# Patient Record
Sex: FEMALE | Race: WHITE | Employment: UNEMPLOYED | URBAN - METROPOLITAN AREA
[De-identification: names, ages, dates, MRNs, and addresses within clinical notes are randomized per-mention and may not be internally consistent; named-entity substitution may affect disease eponyms.]

---

## 2017-01-03 ENCOUNTER — OFFICE VISIT (OUTPATIENT)
Dept: PULMONOLOGY | Age: 37
End: 2017-01-03

## 2017-01-03 VITALS
DIASTOLIC BLOOD PRESSURE: 60 MMHG | WEIGHT: 206 LBS | RESPIRATION RATE: 18 BRPM | HEART RATE: 76 BPM | BODY MASS INDEX: 34.32 KG/M2 | HEIGHT: 65 IN | OXYGEN SATURATION: 99 % | TEMPERATURE: 98.1 F | SYSTOLIC BLOOD PRESSURE: 104 MMHG

## 2017-01-03 DIAGNOSIS — J44.9 ASTHMA WITH COPD (HCC): ICD-10-CM

## 2017-01-03 DIAGNOSIS — F17.200 TOBACCO DEPENDENCE: Primary | ICD-10-CM

## 2017-01-03 RX ORDER — DOXYCYCLINE 100 MG/1
100 CAPSULE ORAL 2 TIMES DAILY
Qty: 14 CAP | Refills: 0 | Status: SHIPPED | OUTPATIENT
Start: 2017-01-03 | End: 2017-03-13 | Stop reason: ALTCHOICE

## 2017-01-03 RX ORDER — ALBUTEROL SULFATE 90 UG/1
2 AEROSOL, METERED RESPIRATORY (INHALATION)
Qty: 1 INHALER | Refills: 2 | Status: SHIPPED | OUTPATIENT
Start: 2017-01-03 | End: 2017-03-13 | Stop reason: SDUPTHER

## 2017-01-03 NOTE — MR AVS SNAPSHOT
Visit Information Date & Time Provider Department Dept. Phone Encounter #  
 1/3/2017  3:15 PM Lopez Redman MD [facility] [place] [id number] Follow-up Instructions Return in about 6 months (around 7/3/2017). Follow-up and Disposition History Upcoming Health Maintenance Date Due DTaP/Tdap/Td series (1 - Tdap) 1/25/1999 PAP AKA CERVICAL CYTOLOGY 7/22/2018 Allergies as of 1/3/2017  Review Complete On: 1/3/2017 By: Mi Canales LPN Severity Noted Reaction Type Reactions Prozac [Fluoxetine] Low 02/11/2012   Not Verified Unknown (comments) Current Immunizations  Reviewed on 9/26/2016 Name Date Influenza Vaccine Craig Stevenson) 9/26/2016 PPD 11/15/2010 Pneumococcal Polysaccharide (PPSV-23) 9/26/2016 Not reviewed this visit You Were Diagnosed With   
  
 Codes Comments Tobacco dependence    -  Primary ICD-10-CM: D67.343 ICD-9-CM: 305.1 [facility]     ICD-10-CM: J44.9, J45.909 ICD-9-CM: 493.20 Vitals BP Pulse Temp Resp Height(growth percentile) Weight(growth percentile) 104/60 (BP 1 Location: Right arm, BP Patient Position: Sitting) 76 98.1 °F (36.7 °C) (Oral) 18 5' 4.5\" (1.638 m) 206 lb (93.4 kg) SpO2 BMI OB Status Smoking Status 99% 34.81 kg/m2 Medically Induced Current Every Day Smoker BMI and BSA Data Body Mass Index Body Surface Area 34.81 kg/m 2 2.06 m 2 Preferred Pharmacy Pharmacy Name Phone [facility] [place], [address] [phone] Your Updated Medication List  
  
   
This list is accurate as of: 1/3/17  3:44 PM.  Always use your most recent med list.  
  
  
  
  
 * albuterol sulfate 90 mcg/actuation Aepb Commonly known as:  Betty Jerel Take 1 Puff by inhalation four (4) times daily as needed for Cough (wheeze). * albuterol 90 mcg/actuation inhaler Commonly known as:  PROVENTIL HFA, VENTOLIN HFA, PROAIR HFA Take 2 Puffs by inhalation every four (4) hours as needed for Wheezing. beclomethasone 80 mcg/actuation inhaler Commonly known as:  QVAR INHALE 1 PUFF TWO TIMES A DAY Biotin 2,500 mcg Cap Take  by mouth. BUSPAR PO Take 10 mg by mouth two (2) times a day. doxycycline 100 mg capsule Commonly known as:  Leticia Rhymes Take 1 Cap by mouth two (2) times a day. * GEODON 80 mg capsule Generic drug:  ziprasidone Take 80 mg by mouth daily. * GEODON 20 mg capsule Generic drug:  ziprasidone Take 20 mg by mouth daily. inhalational spacing device 1 Each by Does Not Apply route as needed. KlonoPIN 1 mg tablet Generic drug:  clonazePAM  
Take 1 mg by mouth daily as needed. LAMICTAL PO Take  by mouth.  
  
 lidocaine 4 % topical cream  
Commonly known as:  XYLOCAINE Apply  to affected area two (2) times daily as needed for Pain. naproxen 250 mg tablet Commonly known as:  NAPROSYN  
TAKE ONE TABLET TWO TIMES A DAY WITH MEALS  
  
 omeprazole 20 mg capsule Commonly known as:  PRILOSEC  
TAKE ONE CAPSULE EVERY DAY  
  
 * SEROquel 200 mg tablet Generic drug:  QUEtiapine Take 200 mg by mouth daily. * SEROquel 50 mg tablet Generic drug:  QUEtiapine Take 150 mg by mouth daily. * Notice: This list has 6 medication(s) that are the same as other medications prescribed for you. Read the directions carefully, and ask your doctor or other care provider to review them with you. Prescriptions Sent to Pharmacy Refills  
 beclomethasone (QVAR) 80 mcg/actuation inhaler 5 Sig: INHALE 1 PUFF TWO TIMES A DAY Class: Normal  
 Pharmacy: [facility] - [place], [address]  Ph #: [phone]  
 albuterol (PROVENTIL HFA, VENTOLIN HFA, PROAIR HFA) 90 mcg/actuation inhaler 2  Sig: Take 2 Puffs by inhalation every four (4) hours as needed for Wheezing. Class: Normal  
 Pharmacy: [facility] - [address] [facility], [address] [place] Ph #: [phone] Route: Inhalation  
 doxycycline (MONODOX) 100 mg capsule 0 Sig: Take 1 Cap by mouth two (2) times a day. Class: Normal  
 Pharmacy: [facility] - [address] [facility], [address] [place] Ph #: [phone] Route: Oral  
  
Follow-up Instructions Return in about 6 months (around 7/3/2017). Patient Instructions Continue Qvar 2 inhalations twice daily and remember to wash mouth with water and spit it out after inhaling Qvar Albuterol 2 inhalations every 4 hours as needed if you require albuterol too often to control respiratory symptoms call the office for severe symptoms go to the emergency room Doxycycline 1 capsule twice daily for one week Do everything you can to stop smoking including using a nicotine patch every day and substituting hard candy and gum you want to smoke Patient Instructions History Introducing [facility]! [facility] introduces SupportSpace patient portal. Now you can access parts of your medical record, email your doctor's office, and request medication refills online. 1. In your internet browser, go to [email or web address] 2. Click on the First Time User? Click Here link in the Sign In box. You will see the New Member Sign Up page. 3. Enter your SupportSpace Access Code exactly as it appears below. You will not need to use this code after youve completed the sign-up process. If you do not sign up before the expiration date, you must request a new code. · SupportSpace Access Code: [email or web address] Expires: 4/3/2017  3:44 PM 
 
4. Enter the last four digits of your Social Security Number (xxxx) and Date of Birth (mm/dd/yyyy) as indicated and click Submit. You will be taken to the next sign-up page. 5. Create a Mobile Health Consumert ID.  This will be your Mobile Health [employer] login ID and cannot be changed, so think of one that is secure and easy to remember. 6. Create a [employer] password. You can change your password at any time. 7. Enter your Password Reset Question and Answer. This can be used at a later time if you forget your password. 8. Enter your e-mail address. You will receive e-mail notification when new information is available in [address] 9. Click Sign Up. You can now view and download portions of your medical record. 10. Click the Download Summary menu link to download a portable copy of your medical information. If you have questions, please visit the Frequently Asked Questions section of the [employer] website. Remember, Coskata is NOT to be used for urgent needs. For medical emergencies, dial 911. Now available from your iPhone and Android! Please provide this summary of care documentation to your next provider. Your primary care clinician is listed as Fatemeh Kemp. If you have any questions after today's visit, please call [phone].

## 2017-01-03 NOTE — PROGRESS NOTES
[facility]  Pulmonary, Critical Care, and Sleep Medicine      Chief complaint:  cough    HPI:  Joseluis Dodge is 43years old and returns to the office today for followup concerning a cough which has been present for months. She relates the cough is productive of clear to Mae Walters to occasional yellow mucus. She denies hemoptysis or chest pain and reports shortness of breath but is out of her albuterol and Qvar. She also relates she continues to smoke 30 cigarettes a day. Allergies   Allergen Reactions    Prozac [Fluoxetine] Unknown (comments)     Current Outpatient Prescriptions   Medication Sig    beclomethasone (QVAR) 80 mcg/actuation inhaler INHALE 1 PUFF TWO TIMES A DAY    albuterol (PROVENTIL HFA, VENTOLIN HFA, PROAIR HFA) 90 mcg/actuation inhaler Take 2 Puffs by inhalation every four (4) hours as needed for Wheezing.  doxycycline (MONODOX) 100 mg capsule Take 1 Cap by mouth two (2) times a day.  omeprazole (PRILOSEC) 20 mg capsule TAKE ONE CAPSULE EVERY DAY    ziprasidone (GEODON) 20 mg capsule Take 20 mg by mouth daily.  LAMOTRIGINE (LAMICTAL PO) Take  by mouth.  Biotin 2,500 mcg cap Take  by mouth.  albuterol sulfate (PROAIR RESPICLICK) 90 mcg/actuation aepb Take 1 Puff by inhalation four (4) times daily as needed for Cough (wheeze).  inhalational spacing device 1 Each by Does Not Apply route as needed.  lidocaine (XYLOCAINE) 4 % topical cream Apply  to affected area two (2) times daily as needed for Pain.  QUEtiapine (SEROQUEL) 200 mg tablet Take 200 mg by mouth daily.  QUEtiapine (SEROQUEL) 50 mg tablet Take 150 mg by mouth daily.  ziprasidone (GEODON) 80 mg capsule Take 80 mg by mouth daily.  BUSPIRONE HCL (BUSPAR PO) Take 10 mg by mouth two (2) times a day.  clonazePAM (KLONOPIN) 1 mg tablet Take 1 mg by mouth daily as needed.       naproxen (NAPROSYN) 250 mg tablet TAKE ONE TABLET TWO TIMES A DAY WITH MEALS     No current facility-administered medications for this visit. Past Medical History   Diagnosis Date    Asthma     Bipolar 1 disorder ([address]      Followed by [facility], depression and anxiety    Genital HSV     IBS (irritable bowel syndrome)     Onychomycosis      was on lamisol for this 3 mos ago. Was on it for 6 mos.  Psychiatric disorder     Reactive airway disease     Tinea pedis      Past Surgical History   Procedure Laterality Date    Hx gyn      Hx ovarian cyst removal       right ovary    Hx cyst removal       hand    Hx orthopaedic       right hand    Hx colonoscopy       age 22 for diarrhea     Social History     Social History    Marital status: SINGLE     Spouse name: N/A    Number of children: N/A    Years of education: N/A     Occupational History    Not on file.      Social History Main Topics    Smoking status: Current Every Day Smoker     Packs/day: 2.00     Years: 23.00     Types: Cigarettes     Start date: 2/9/1992    Smokeless tobacco: Never Used    Alcohol use 2.5 oz/week     5 Cans of beer per week      Comment: 2x week and on weekends: 1-6 drinks/sitting    Drug use: No    Sexual activity: Yes      Comment: condoms     Other Topics Concern    Not on file     Social History Narrative     Family History   Problem Relation Age of Onset   [de-identified] Arthritis-osteo Mother     Migraines Mother     Stroke Mother     Alcohol abuse Father    Lupe Jews Father     Hypertension Father     Asthma Sister     Diabetes Sister     Heart Disease Sister     Stroke Maternal Grandmother     Diabetes Maternal Grandfather     COPD Maternal Grandfather     No Known Problems Paternal Grandmother     No Known Problems Paternal Grandfather        Review of systems:  She denies fever chills poor appetite and weight loss    Physical Exam:  Visit Vitals    /60 (BP 1 Location: Right arm, BP Patient Position: Sitting)    Pulse 76    Temp 98.1 °F (36.7 °C) (Oral)    Resp 18  Ht 5' 4.5\" (1.638 m)    Wt 93.4 kg (206 lb)    SpO2 99%  Comment: RA Rest    BMI 34.81 kg/m2       Well-developed well-nourished/obese  HEENT: WNL  Lymph node exam: Supraclavicular cervical lymph nodes negative  Chest: Equal symmetrical expansion no excess remote usage no dullness no wheezes rales rubs  Heart: Regular rhythm no gallop or murmur no JVD no peripheral edema  Extremities: No cyanosis clubbing or calf tenderness  Neurological: Alert and oriented    LABS:    O2 sat room air at rest 99%    Impression:   Chronic bronchitis related to cigarette smoking  History of COPD asthma    Plan:  Again encouraged the patient to stop smoking she says she is going to a smoking cessation class and will also advise her to use nicotine patches substitute gum and hard candy  Continue Qvar when necessary albuterol  Short course of doxycycline for history of discolored mucus    Mukund Goss MD , [facility]    CC: Leonardo Alvarado DO     [address] [facility]  [place], [address] [facility] [address]     P: [phone]     F: [phone]

## 2017-01-03 NOTE — PATIENT INSTRUCTIONS
Continue Qvar 2 inhalations twice daily and remember to wash mouth with water and spit it out after inhaling Qvar  Albuterol 2 inhalations every 4 hours as needed if you require albuterol too often to control respiratory symptoms call the office for severe symptoms go to the emergency room  Doxycycline 1 capsule twice daily for one week  Do everything you can to stop smoking including using a nicotine patch every day and substituting hard candy and gum you want to smoke

## 2017-01-04 RX ORDER — BENZONATATE 200 MG/1
200 CAPSULE ORAL
Qty: 21 CAP | Refills: 2 | Status: SHIPPED | OUTPATIENT
Start: 2017-01-04 | End: 2017-01-11

## 2017-03-13 ENCOUNTER — OFFICE VISIT (OUTPATIENT)
Dept: INTERNAL MEDICINE CLINIC | Age: 37
End: 2017-03-13

## 2017-03-13 VITALS
HEIGHT: 65 IN | WEIGHT: 198 LBS | RESPIRATION RATE: 16 BRPM | HEART RATE: 77 BPM | TEMPERATURE: 98.4 F | DIASTOLIC BLOOD PRESSURE: 62 MMHG | BODY MASS INDEX: 32.99 KG/M2 | OXYGEN SATURATION: 95 % | SYSTOLIC BLOOD PRESSURE: 108 MMHG

## 2017-03-13 DIAGNOSIS — R12 HEART BURN: ICD-10-CM

## 2017-03-13 DIAGNOSIS — Z79.899 ENCOUNTER FOR LONG-TERM (CURRENT) USE OF MEDICATIONS: ICD-10-CM

## 2017-03-13 DIAGNOSIS — J02.9 SORE THROAT: ICD-10-CM

## 2017-03-13 DIAGNOSIS — J40 BRONCHITIS: ICD-10-CM

## 2017-03-13 DIAGNOSIS — F17.200 TOBACCO DEPENDENCE: ICD-10-CM

## 2017-03-13 DIAGNOSIS — J45.30 MILD PERSISTENT ASTHMA WITHOUT COMPLICATION: Primary | ICD-10-CM

## 2017-03-13 RX ORDER — ALBUTEROL SULFATE 90 UG/1
2 AEROSOL, METERED RESPIRATORY (INHALATION)
Qty: 1 INHALER | Refills: 2 | Status: SHIPPED | OUTPATIENT
Start: 2017-03-13 | End: 2018-01-09 | Stop reason: SDUPTHER

## 2017-03-13 RX ORDER — OMEPRAZOLE 20 MG/1
20 CAPSULE, DELAYED RELEASE ORAL DAILY
Qty: 30 CAP | Refills: 5 | Status: CANCELLED | OUTPATIENT
Start: 2017-03-13

## 2017-03-13 RX ORDER — DOXYCYCLINE 100 MG/1
100 CAPSULE ORAL 2 TIMES DAILY
Qty: 20 CAP | Refills: 0 | Status: SHIPPED | OUTPATIENT
Start: 2017-03-13 | End: 2017-03-23

## 2017-03-13 RX ORDER — OMEPRAZOLE 20 MG/1
20 CAPSULE, DELAYED RELEASE ORAL DAILY
Qty: 30 CAP | Refills: 1 | Status: SHIPPED | OUTPATIENT
Start: 2017-03-13 | End: 2017-05-17 | Stop reason: SDUPTHER

## 2017-03-13 RX ORDER — SERTRALINE HYDROCHLORIDE 100 MG/1
100 TABLET, FILM COATED ORAL DAILY
COMMUNITY
End: 2018-06-08 | Stop reason: ALTCHOICE

## 2017-03-13 RX ORDER — DEXTROAMPHETAMINE SACCHARATE, AMPHETAMINE ASPARTATE, DEXTROAMPHETAMINE SULFATE AND AMPHETAMINE SULFATE 2.5; 2.5; 2.5; 2.5 MG/1; MG/1; MG/1; MG/1
20 TABLET ORAL 3 TIMES DAILY
COMMUNITY
End: 2017-10-09

## 2017-03-13 NOTE — PROGRESS NOTES
Chief Complaint   Patient presents with    Sore Throat     x 2 days    Cough     1. Have you been to the ER, urgent care clinic since your last visit? Hospitalized since your last visit?no      2. Have you seen or consulted any other health care providers outside of the [address] [place] [address] since your last visit? Include any pap smears or colon screening.  No

## 2017-03-13 NOTE — PROGRESS NOTES
HISTORY OF PRESENT ILLNESS  Francy Chow is a 42 y.o. female. Here today for a sore throat and a cough. HPI   Sore throat  She reports sore throat for the last 2 days. She has had a cough \"forever\". Denies any sick contacts. She did get her flu shot this year. Asthma  She was recently seen by her pulmonologist, treated with doxycycline in Jan. She states she does use her qvar daily but has run out of her albuterol inhaler, has not used this in several weeks now. She continues to smoke, states she is trying to quit. She was previously on chantix for this. She reports increase in dark mucus production over the last several weeks. Increased coughing. Denies any fevers. Chronic diarrhea/ heart burn  She has not met with [facility] for chronic diarrhea and heartburn symptoms. She felt that they would order and colonoscopy and her insurance would not cover this. She is requesting to have her liver enzymes checked, as she has been told that her psychiatric medication can cause liver problems. She is currently on seroquel, geodon, clonopin and buspar. She follows with [facility] psych regularly for her mental health care, happy with current treatment plan. Past Medical History:   Diagnosis Date    Asthma     B[facility] 1 disorder ([facility]     Follow[facility] by [facility] Dept, depression and anxiety    Genital HSV     IBS (irritable bowel syndrome)     Onychomycosis     was on lamisol for this 3 mos ago. Was on it for 6 mos.  Psychiatric disorder     Reactive airway disease     Tinea pedis      Past Surgical History:   Procedure Laterality Date    HX COLONOSCOPY      age 22 for diarrhea    HX CYST REMOVAL      hand    HX GYN      HX ORTHOPAEDIC      right hand    HX OVARIAN CYST REMOVAL      right ovary     Current Outpatient Prescriptions   Medication Sig    sertraline (ZOLOFT) 100 mg tablet Take 100 mg by mouth daily.     dextroamphetamine-amphetamine (ADDERALL) 10 mg tablet Take 10 mg by mouth three (3) times daily.  beclomethasone (QVAR) 80 mcg/actuation inhaler INHALE 1 PUFF TWO TIMES A DAY    LAMOTRIGINE (LAMICTAL PO) Take  by mouth daily.  Biotin 2,500 mcg cap Take  by mouth daily.  inhalational spacing device 1 Each by Does Not Apply route as needed.  lidocaine (XYLOCAINE) 4 % topical cream Apply  to affected area two (2) times daily as needed for Pain.  naproxen (NAPROSYN) 250 mg tablet TAKE ONE TABLET TWO TIMES A DAY WITH MEALS    QUEtiapine (SEROQUEL) 50 mg tablet Take 50 mg by mouth daily.  ziprasidone (GEODON) 80 mg capsule Take 80 mg by mouth daily.  BUSPIRONE HCL (BUSPAR PO) Take 10 mg by mouth two (2) times a day.  clonazePAM (KLONOPIN) 1 mg tablet Take 1 mg by mouth daily as needed.  albuterol (PROVENTIL HFA, VENTOLIN HFA, PROAIR HFA) 90 mcg/actuation inhaler Take 2 Puffs by inhalation every four (4) hours as needed for Wheezing.  omeprazole (PRILOSEC) 20 mg capsule TAKE ONE CAPSULE EVERY DAY    albuterol sulfate (PROAIR RESPICLICK) 90 mcg/actuation aepb Take 1 Puff by inhalation four (4) times daily as needed for Cough (wheeze). No current facility-administered medications for this visit. Allergies and Intolerances: Allergies   Allergen Reactions    Prozac [Fluoxetine] Unknown (comments) and Other (comments)     Family History:   Family History   Problem Relation Age of Onset   [facility] Arthritis-osteo Mother    [facility] Migraines Mother     Stroke Mother     Alcohol abuse Father     Cancer Father     Hypertension Father     Asthma Sister     Diabetes Sister     Heart Disease Sister     Stroke Maternal Grandmother     Diabetes Maternal Grandfather     COPD Maternal Grandfather     No Known Problems Paternal Grandmother     No Known Problems Paternal Grandfather         Social History:   She  reports that she has been smoking Cigarettes. She started smoking about 25 years ago. She has a 46.00 pack-year smoking history.  She has never used smokeless tobacco.  She  reports that she drinks about 2.5 oz of alcohol per week      Vitals:   Visit Vitals    /62 (BP 1 Location: Left arm, BP Patient Position: Sitting)    Pulse 77    Temp 98.4 °F (36.9 °C) (Oral)    Resp 16    Ht 5' 4.5\" (1.638 m)    Wt 198 lb (89.8 kg)    SpO2 95%    BMI 33.46 kg/m2        Body surface area is 2.02 meters squared. BP Readings from Last 3 Encounters:   03/13/17 108/62   01/03/17 104/60   12/07/16 121/73        Wt Readings from Last 3 Encounters:   03/13/17 198 lb (89.8 kg)   01/03/17 206 lb (93.4 kg)   12/07/16 199 lb 5 oz (90.4 kg)        Case and notes discussed with MA and reviewed with patient for accuracy. I have personally reviewed and subsequently discussed with the MA any pertinent, preliminary and incomplete elements of the history related to the chief complaint that were recorded by the MA in the patients chart during the initial rooming of the patient. As I have explored the necessary and required elements in detail with the patient during my personal interview with them, I have personally verified, clarified, amended, added to and/or revised said elements based on information acquired during during the interview. Review of Systems   Constitutional: Negative for chills and fever. HENT: Positive for sore throat. Negative for congestion and ear pain. Eyes: Negative for blurred vision and double vision. Respiratory: Positive for cough, sputum production, shortness of breath and wheezing. Cardiovascular: Negative for chest pain, palpitations and leg swelling. Gastrointestinal: Positive for diarrhea and heartburn. Negative for abdominal pain, blood in stool, melena, nausea and vomiting. Chronic intermittent diarrhea, no change   Genitourinary: Negative for dysuria and hematuria. Skin: Negative for itching and rash. Neurological: Negative for seizures, loss of consciousness and headaches.        Physical Exam   Constitutional: She appears well-developed and well-nourished. No distress. HENT:   Head: Normocephalic and atraumatic. Right Ear: Tympanic membrane, external ear and ear canal normal.   Left Ear: Tympanic membrane, external ear and ear canal normal.   Nose: Nose normal. No mucosal edema. Mouth/Throat: Uvula is midline, oropharynx is clear and moist and mucous membranes are normal. No oropharyngeal exudate, posterior oropharyngeal edema or posterior oropharyngeal erythema. Prominent tonsils bilaterally. Eyes: Conjunctivae are normal. Pupils are equal, round, and reactive to light. Neck: No thyromegaly present. Tenderness to left cervical region   Cardiovascular: Normal rate, regular rhythm and normal heart sounds. Pulmonary/Chest: Effort normal. No accessory muscle usage. No respiratory distress. She has no decreased breath sounds. She has wheezes. She has no rhonchi. Exp wheezes to bilateral mid lobes. Neb treatment given in office, after treatment wheezing resolved. O2 sats 95-97% RA. Abdominal: Soft. Bowel sounds are normal. She exhibits no distension. There is no tenderness. Musculoskeletal: She exhibits no edema. Lymphadenopathy:     She has no cervical adenopathy. Neurological: She is alert. Skin: Skin is warm and dry. No rash noted. Psychiatric: She has a normal mood and affect. Her behavior is normal.   Nursing note and vitals reviewed. ASSESSMENT and PLAN    ICD-10-CM ICD-9-CM    1. Mild persistent asthma without complication: refilled albuterol MDI. Encouraged her to use this as needed for wheezing or coughing spells. Neb treatment given in office with improvement to wheezing. Vitals stable, O2 sats 95-97% RA. Due to increased mucus will be treating for bronchitis as well, see below. J45.30 493.90 albuterol (PROVENTIL HFA, VENTOLIN HFA, PROAIR HFA) 90 mcg/actuation inhaler   2. Bronchitis: increased mucus production, smoker, asthma.  Will treat today with doxycycline, patient has taken this in the past with no problems. Encouraged her to take with yogurt or probiotic to help prevent GI upset, normal elayne. Encouraged her to use albuterol inhaler as needed for wheezing, coughing as well. Encouraged her to cut back on smoking, working towards smoking cessation. Discussed reasons to call or follow up sooner with no improvement, fevers or any other concerns. She agrees to plan. J40 490 doxycycline (MONODOX) 100 mg capsule   3. Sore throat: rapid strep neg today. Likely related to bronchitis, see above. J02.9 462 AMB POC RAPID STREP A   4. Heart burn: refilled prilosec for her today as this helps with her heartburn symptoms. Encouraged her to follow up with GI as previously recommended for further eval of long term heartburn as well as diarrhea symptoms. R12 787.1 omeprazole (PRILOSEC) 20 mg capsule   5. Tobacco dependence: The patient was counseled on the dangers of tobacco use, and was advised to quit. Reviewed strategies to maximize success, including removing cigarettes and smoking materials from environment, stress management, substitution of other forms of reinforcement and support of family/friends. F17.200 305.1    6. Encounter for long-term (current) use of medications: she is requesting check of her liver enzymes due to hepatic clearance of many of her psychiatric medication. She continues to follow with [facility] psych regularly for her mental health care, happy with current treatment plan. Z79.899 V58.69 HEPATIC FUNCTION PANEL     Emily Shelton was seen today for sore throat and cough. Diagnoses and all orders for this visit:    Mild persistent asthma without complication  -     albuterol (PROVENTIL HFA, VENTOLIN HFA, PROAIR HFA) 90 mcg/actuation inhaler; Take 2 Puffs by inhalation every four (4) hours as needed for Wheezing. Bronchitis  -     doxycycline (MONODOX) 100 mg capsule; Take 1 Cap by mouth two (2) times a day for 10 days.     Sore throat  - AMB POC RAPID STREP A    Heart burn  -     omeprazole (PRILOSEC) 20 mg capsule; Take 1 Cap by mouth daily. Tobacco dependence    Encounter for long-term (current) use of medications  -     HEPATIC FUNCTION PANEL; Future    Other orders  -     Cancel: omeprazole (PRILOSEC) 20 mg capsule; Take 1 Cap by mouth daily. Follow-up Disposition:  Return in about 6 months (around 9/13/2017), or if symptoms worsen or fail to improve. The plan of care was discussed with the patient, who verbalizes understanding. Discussed all medications, side effects with patient. The patient is to follow up as scheduled and will report to the ED or the office if symptoms change or increase. The patient has voiced understanding and will comply to plan of care.

## 2017-03-13 NOTE — MR AVS SNAPSHOT
Visit Information Date & Time Provider Department Dept. Phone Encounter #  
 3/13/2017  2:00 PM John Solano [place] [facility] of [address] [id number] Follow-up Instructions Return in about 6 months (around 9/13/2017), or if symptoms worsen or fail to improve. Upcoming Health Maintenance Date Due DTaP/Tdap/Td series (1 - Tdap) 1/25/1999 PAP AKA CERVICAL CYTOLOGY 7/22/2018 Allergies as of 3/13/2017  Review Complete On: 3/13/2017 By: John Solano NP Severity Noted Reaction Type Reactions Prozac [Fluoxetine] Low 02/11/2012   Not Verified Unknown (comments), Other (comments) Current Immunizations  Reviewed on 9/26/2016 Name Date Influenza Vaccine Nadean Correa) 9/26/2016 PPD 11/15/2010 Pneumococcal Polysaccharide (PPSV-23) 9/26/2016 Not reviewed this visit You Were Diagnosed With   
  
 Codes Comments Mild persistent asthma without complication    -  Primary ICD-10-CM: J45.30 ICD-9-CM: 493.90 Bronchitis     ICD-10-CM: J40 ICD-9-CM: 452 Sore throat     ICD-10-CM: J02.9 ICD-9-CM: 557 Heart burn     ICD-10-CM: R12 
ICD-9-CM: 787.1 Tobacco dependence     ICD-10-CM: G57.092 ICD-9-CM: 305.1 Encounter for long-term (current) use of medications     ICD-10-CM: Z79.899 ICD-9-CM: V58.69 Vitals BP Pulse Temp Resp Height(growth percentile) Weight(growth percentile) 108/62 (BP 1 Location: Left arm, BP Patient Position: Sitting) 77 98.4 °F (36.9 °C) (Oral) 16 5' 4.5\" (1.638 m) 198 lb (89.8 kg) SpO2 BMI OB Status Smoking Status 95% 33.46 kg/m2 Medically Induced Current Every Day Smoker Vitals History BMI and BSA Data Body Mass Index Body Surface Area  
 33.46 kg/m 2 2.02 m 2 Preferred Pharmacy Pharmacy Name Phone [facility], [facility] [address] [phone] Your Updated Medication List  
  
   
 This list is accurate as of: 3/13/17  2:44 PM.  Always use your most recent med list.  
  
  
  
  
 ADDERALL 10 mg tablet Generic drug:  dextroamphetamine-amphetamine Take 10 mg by mouth three (3) times daily. albuterol 90 mcg/actuation inhaler Commonly known as:  PROVENTIL HFA, VENTOLIN HFA, PROAIR HFA Take 2 Puffs by inhalation every four (4) hours as needed for Wheezing. beclomethasone 80 mcg/actuation [employer] Commonly known as:  QVAR INHALE 1 PUFF TWO TIMES A DAY Biotin 2,500 mcg Cap Take  by mouth daily. BUSPAR PO Take 10 mg by mouth two (2) times a day. doxycycline 100 mg capsule Commonly known as:  [place] Take 1 Cap by mouth two (2) times a day for 10 days. GEODON 80 mg capsule Generic drug:  ziprasidone Take 80 mg by mouth daily. inhalational spacing device 1 Each by Does Not Apply route as needed. KlonoPIN 1 mg tablet Generic drug:  clonazePAM  
Take 1 mg by mouth daily as needed. LAMICTAL PO Take  by mouth daily. lidocaine 4 % topical cream  
Commonly known as:  XYLOCAINE Apply  to affected area two (2) times daily as needed for Pain. naproxen 250 mg tablet Commonly known as:  NAPROSYN  
TAKE ONE TABLET TWO TIMES A DAY WITH MEALS  
  
 omeprazole 20 mg capsule Commonly known as:  PRILOSEC Take 1 Cap by mouth daily. SEROquel 50 mg tablet Generic drug:  QUEtiapine Take 50 mg by mouth daily. ZOLOFT 100 mg tablet Generic drug:  sertraline Take 100 mg by mouth daily. Prescriptions Sent to Pharmacy Refills  
 omeprazole (PRILOSEC) 20 mg capsule 1 Sig: Take 1 Cap by mouth daily. Class: Normal  
 Pharmacy: [facility], [address]  Ph #: [phone] Route: Oral  
 albuterol (PROVENTIL HFA, VENTOLIN HFA, PROAIR HFA) 90 mcg/actuation inhaler 2 Sig: Take 2 Puffs by inhalation every four (4) hours as needed for Wheezing. Class: Normal  
 Pharmacy: [facility] - [address] [facility], [address] Ph #: [phone] Route: Inhalation  
 doxycycline (MONODOX) 100 mg capsule 0 Sig: Take 1 Cap by mouth two (2) times a day for 10 days. Class: Normal  
 Pharmacy: [facility] - [address] [facility], [address] Ph #: [phone] Route: Oral  
  
We Performed the Following AMB POC RAPID STREP A [19037 CPT(R)] Follow-up Instructions Return in about 6 months (around 9/13/2017), or if symptoms worsen or fail to improve. To-Do List   
 03/13/2017 Lab:  HEPATIC FUNCTION PANEL Patient Instructions Health Maintenance Due Topic Date Due  
 DTaP/Tdap/Td series (1 - Tdap) 01/25/1999 Asthma Action Plan: After Your Visit Your Care Instructions An asthma action plan is based on peak flow and asthma symptoms. Sorting symptoms and peak flow into red, yellow, and green \"zones\" can help you know how bad your asthma is and what actions you should take. Work with your doctor to make your plan. An action plan may include: · The peak flow readings and symptoms for each zone. · What medicines to take in each zone. · When to call a doctor. · A list of emergency contact numbers. · A list of your asthma triggers. Follow-up care is a key part of your treatment and safety. Be sure to make and go to all appointments, and call your doctor if you are having problems. It's also a good idea to know your test results and keep a list of the medicines you take. How can you care for yourself at home? · Take your daily medicines to help minimize long-term damage and avoid asthma attacks. · Check your peak flow every morning and evening. This is the best way to know how well your lungs are working. · Check your action plan to see what zone you are in. 
¨ If you are in the green zone, keep taking your daily asthma medicines as prescribed. ¨ If you are in the yellow zone, you may be having or will soon have an asthma attack. You may not have any symptoms, but your lungs are not working as well as they should. Take the medicines listed in your action plan. If you stay in the yellow zone, your doctor may need to increase the dose or add a medicine. ¨ If you are in the red zone, follow your action plan. If your symptoms or peak flow don't improve soon, you may need to go to the emergency room or be admitted to the hospital. 
· Use an asthma diary. Write down your peak flow readings in the asthma diary. If you have an attack, write down what caused it (if you know), the symptoms, and what medicine you took. · Make sure you know how and when to call your doctor or go to the hospital. 
· Take both the asthma action plan and the asthma diaryalong with your peak flow meter and medicineswhen you see your doctor. Tell your doctor if you are having trouble following your action plan. When should you call for help? Call 911 anytime you think you may need emergency care. For example, call if: 
· You have severe trouble breathing. Call your doctor now or seek immediate medical care if: 
· Your symptoms do not get better after you have followed your asthma action plan. · You cough up yellow, dark brown, or bloody mucus (sputum). Watch closely for changes in your health, and be sure to contact your doctor if: 
· Your coughing and wheezing get worse. · You need to use quick-relief medicine on more than 2 days a week (unless it is just for exercise). · You need help figuring out what is triggering your asthma attacks. Where can you learn more? Go to [employer] Enter [id number] in the search box to learn more about \"Asthma Action Plan: After Your Visit. \"  
© 6974-1408 Healthwise, Incorporated.  Care instructions adapted under license by Romayne Duster (which disclaims liability or warranty for this information). This care instruction is for use with your licensed healthcare professional. If you have questions about a medical condition or this instruction, always ask your healthcare professional. [place] [address] any warranty or liability for your use of this information. Content Version: 39.7.236644; Last Revised: March 9, 2012 Doxycycline (By mouth) Doxycycline (ucd-c-RTW-anh) Treats and prevents infections. Also used to prevent malaria and treat rosacea or severe acne. This medicine is a tetracycline antibiotic. Brand Name(s):Acticlate, Adoxa, Adoxa Ambrocio 1/150, Avidoxy, Avidoxy DK, BenzoDox 30 Kit, BenzoDox 60 Kit, Doryx, Monodox, Morgidox [id number], Morgidox [id number] Kit, Morgidox 0G071FI, Morgidox [id number] Kit, NutriDox Convenience, [address] There may be other brand names for this medicine. When This Medicine Should Not Be Used: This medicine is not right for everyone. Do not use it if you had an allergic reaction to doxycycline or another tetracycline antibiotic, or if you are pregnant or breastfeeding. How to Use This Medicine:  
Capsule, Delayed Release Capsule, Long Acting Capsule, Liquid, Tablet, Delayed Release Tablet · Your doctor will tell you how much medicine to use. Do not use more than directed. · Ask your pharmacist or doctor if you need to take this medicine with or without food. Some forms can be taken with food or milk, but others must be taken on an empty stomach. · Tablets: You may take this medicine with food or milk to avoid stomach irritation. To break a tablet, hold the tablet between your thumb and index fingers close to the appropriate scored line. Then, apply enough pressure to snap the tablet segments apart. Do not use the tablet if it does not break on the scored lines. · Delayed-release tablets: You may also take this medicine by sprinkling the broken tablets onto room-temperature applesauce.  Swallow this mixture right away; do not chew. Do not store the mixture for later use. · Oracea® capsules: This medicine must be taken on an empty stomach, at least 1 hour before or 2 hours after a meal. 
· Capsule: Swallow whole. Do not break, crush, chew, or open it. · Oral liquid: Shake the bottle well just before each use. Measure the oral liquid medicine with a marked measuring spoon, oral syringe, or medicine cup. · Take all of the medicine in your prescription to clear up your infection, even if you feel better after the first few doses. · Drink plenty of fluids to avoid throat problems, if you take the capsule or tablet form. · Malaria prevention: Start taking the medicine 1 or 2 days before you travel. Take the medicine every day during your trip. Keep taking it for 4 weeks after you return. However, do not use the medicine for longer than 4 months. · Do not use this medicine for more than 9 months if you are using it for rosacea. · Use only the brand of medicine your doctor prescribed. Other brands may not work the same way. · Read and follow the patient instructions that come with this medicine. Talk to your doctor or pharmacist if you have any questions. · Missed dose: Take a dose as soon as you remember. If it is almost time for your next dose, wait until then and take a regular dose. Do not take extra medicine to make up for a missed dose. · Store the medicine in a closed container at room temperature, away from heat, moisture, and direct light. Do not freeze the oral liquid. Drugs and Foods to Avoid: Ask your doctor or pharmacist before using any other medicine, including over-the-counter medicines, vitamins, and herbal products. · Some foods and medicines can affect how doxycycline works. Tell your doctor if you are using any of the following: ¨ Bismuth subsalicylate, isotretinoin or other acne medicines, acitretin or other medicine to treat psoriasis ¨ Penicillin antibiotic, birth control pills, medicine for seizures (such as carbamazepine, phenobarbital, phenytoin), stomach medicine, a blood thinner (such as warfarin), or any medicine that contains aluminum, calcium, or iron (such an antacid or vitamin supplement) Warnings While Using This Medicine: · This medicine may cause birth defects if either partner is using it during conception or pregnancy. Tell your doctor right away if you or your partner becomes pregnant. Birth control pills may not work as well when used with this medicine. Use a second form of birth control to keep from getting pregnant. · Tell your doctor if you have kidney disease, liver disease, asthma, or an allergy to sulfites. Tell your doctor if you had surgery on your stomach, or if you have a history of yeast infections. · This medicine may cause the following problems: ¨ Permanent change in tooth color (in children younger than 6years old) ¨ Increased pressure inside the head ¨ Yeast infection ¨ Immune system problems · This medicine can cause diarrhea. Call your doctor if the diarrhea becomes severe, does not stop, or is bloody. Do not take any medicine to stop diarrhea until you have talked to your doctor. Diarrhea can occur 2 months or more after you stop taking this medicine. · This medicine may make your skin more sensitive to sunlight. Wear sunscreen. Do not use sunlamps or tanning beds. · Tell any doctor or dentist who treats you that you are using this medicine. This medicine may affect certain medical test results. · Call your doctor if your symptoms do not improve or if they get worse. · Keep all medicine out of the reach of children. Never share your medicine with anyone. Possible Side Effects While Using This Medicine:  
Call your doctor right away if you notice any of these side effects: · Allergic reaction: Itching or hives, swelling in your face or hands, swelling or tingling in your mouth or throat, chest tightness, trouble breathing · Blistering, peeling, red skin rash · Burning, pain, or irritation in your upper stomach or throat · Diarrhea that may contain blood · Fever, chills, cough, runny or stuffy nose, sore throat, and body aches · Joint pain, fever, rash, and unusual tiredness or weakness · Severe headache, dizziness, or vision changes If you notice these less serious side effects, talk with your doctor: · Darkening of your skin, scars, teeth, or gums · Sores or white patches on your lips, mouth, or throat If you notice other side effects that you think are caused by this medicine, tell your doctor. Call your doctor for medical advice about side effects. You may report side effects to FDA at 5-488-FDA-9347 © 2016 [address] is for End User's use only and may not be sold, redistributed or otherwise used for commercial purposes. The above information is an  only. It is not intended as medical advice for individual conditions or treatments. Talk to your doctor, nurse or pharmacist before following any medical regimen to see if it is safe and effective for you. Albuterol (By breathing) Albuterol (al-BUE-ter-ol) Treats or prevents bronchospasm. Brand Name(s):AccuNeb, Novaplus Ventolin HFA, Proventil, Proventil HFA, ReliOn Ventolin HFA, Ventolin HFA There may be other brand names for this medicine. When This Medicine Should Not Be Used: This medicine is not right for everyone. Do not use it if you had an allergic reaction to albuterol or milk proteins. How to Use This Medicine:  
Aerosol, Powder Under Pressure, Solution · Your doctor will tell you how much medicine to use. Do not use more than directed. Ask your doctor or pharmacist if you have questions about how to use your inhaler, nebulizer, or medicine. · Solution: ¨ You will use this medicine with an inhaler device called a nebulizer. The nebulizer turns the medicine into a fine mist that you breathe in through your mouth and to your lungs. Your caregiver will show you how to use your nebulizer. ¨ Store unopened vials of this medicine at room temperature, away from heat and direct light. Do not freeze. An open vial of medicine must be used right away. · Aerosol inhaler: ¨ Shake the inhaler well just before each use. Avoid spraying this medicine into your eyes. Test spray in the air before using for the first time or if the inhaler has not been used for a while. ¨ If you are supposed to use more than one puff, wait 1 to 2 minutes before inhaling the second puff. Repeat these steps for the next puff, starting with shaking the inhaler. ¨ Clean the inhaler mouthpiece at least once a week with warm running water for 30 seconds. Let it air dry completely. ¨ Store the canister at room temperature, away from heat and direct light. Do not freeze. Do not keep this medicine inside a car where it could be exposed to extreme heat or cold. Do not poke holes in the canister or throw it into a fire, even if the canister is empty. · ProAir® Respiclick inhaler: ¨ Make sure the cap is closed. Do not open the cap unless you are going to use the medicine. ¨ Hold the inhaler upright. Open the cap fully until you hear a click. Your inhaler is now ready to use. ¨ Close the cap firmly over the mouthpiece after each use. ¨ Keep the inhaler clean and dry at all times. Do not wash or put any part of the inhaler in water. ¨ To clean the mouthpiece, wipe it gently with a dry cloth or tissue. ¨ Keep the medicine in the foil pouch until you are ready to use it. Store at room temperature, away from heat and direct light. Do not freeze. · Read and follow the patient instructions that come with this medicine. Talk to your doctor or pharmacist if you have any questions. · Missed dose: Take a dose as soon as you remember. If it is almost time for your next dose, wait until then and take a regular dose. Do not take extra medicine to make up for a missed dose. Drugs and Foods to Avoid: Ask your doctor or pharmacist before using any other medicine, including over-the-counter medicines, vitamins, and herbal products. · Some medicines can affect how albuterol works. Tell your doctor if you are using any of the following: ¨ Digoxin ¨ Beta-blocker ¨ Diuretic (water pill) ¨ Medicine for depression or an MAO inhibitor within the past 2 weeks Warnings While Using This Medicine: · Tell your doctor if you are pregnant or breastfeeding, or if you have kidney disease, diabetes, heart disease, heart rhythm problems, high blood pressure, overactive thyroid, or a history of seizures. · This medicine may cause the following problems:  
¨ Paradoxical bronchospasm (increased trouble breathing right after use) ¨ Low potassium levels in the blood · If you use a corticosteroid medicine to control your asthma, keep using it as instructed by your doctor. · Call your doctor if your symptoms do not improve or if they get worse. · If any of your asthma medicines do not seem to be working as well as usual, call your doctor right away. Do not change your doses or stop using your medicines without asking your doctor. · Your doctor will check your progress and the effects of this medicine at regular visits. Keep all appointments. · Keep all medicine out of the reach of children. Never share your medicine with anyone. Possible Side Effects While Using This Medicine:  
Call your doctor right away if you notice any of these side effects: · Allergic reaction: Itching or hives, swelling in your face or hands, swelling or tingling in your mouth or throat, chest tightness, trouble breathing · Dry mouth, increased thirst, muscle cramps, nausea, vomiting · Fast, pounding, or uneven heartbeat, chest pain · Lightheadedness, dizziness, or fainting · Trouble breathing, increased wheezing, cough, chest tightness If you notice these less serious side effects, talk with your doctor: · Cough, sore throat, runny or stuffy nose · Headache · Tremors, nervousness If you notice other side effects that you think are caused by this medicine, tell your doctor. Call your doctor for medical advice about side effects. You may report side effects to FDA at 1-600-ANS-4909 © 2016 [address] is for End User's use only and may not be sold, redistributed or otherwise used for commercial purposes. The above information is an  only. It is not intended as medical advice for individual conditions or treatments. Talk to your doctor, nurse or pharmacist before following any medical regimen to see if it is safe and effective for you. Introducing [facility]! Jose Luis White introduces [facility] patient portal. Now you can access parts of your medical record, email your doctor's office, and request medication refills online. 1. In your internet browser, go to [email or web address] 2. Click on the First Time User? Click Here link in the Sign In box. You will see the New Member Sign Up page. 3. Enter your [facility] Access Code exactly as it appears below. You will not need to use this code after youve completed the sign-up process. If you do not sign up before the expiration date, you must request a new code. · [facility] Access Code: [email or web address] Expires: 4/3/2017  4:44 PM 
 
4. Enter the last four digits of your Social Security Number (xxxx) and Date of Birth (mm/dd/yyyy) as indicated and click Submit. You will be taken to the next sign-up page. 5. Create a Conversion Logict ID. This will be your [facility] login ID and cannot be changed, so think of one that is secure and easy to remember. 6. Create a ipnexus password. You can change your password at any time. 7. Enter your Password Reset Question and Answer. This can be used at a later time if you forget your password. 8. Enter your e-mail address. You will receive e-mail notification when new information is available in [address] 9. Click Sign Up. You can now view and download portions of your medical record. 10. Click the Download Summary menu link to download a portable copy of your medical information. If you have questions, please visit the Frequently Asked Questions section of the ipnexus website. Remember, ipnexus is NOT to be used for urgent needs. For medical emergencies, dial 911. Now available from your iPhone and Android! Please provide this summary of care documentation to your next provider. Your primary care clinician is listed as Denae Cervantes. If you have any questions after today's visit, please call [phone].

## 2017-03-13 NOTE — PATIENT INSTRUCTIONS
Health Maintenance Due   Topic Date Due    DTaP/Tdap/Td series (1 - Tdap) 01/25/1999       Asthma Action Plan: After Your Visit  Your Care Instructions  An asthma action plan is based on peak flow and asthma symptoms. Sorting symptoms and peak flow into red, yellow, and green \"zones\" can help you know how bad your asthma is and what actions you should take. Work with your doctor to make your plan. An action plan may include:  · The peak flow readings and symptoms for each zone. · What medicines to take in each zone. · When to call a doctor. · A list of emergency contact numbers. · A list of your asthma triggers. Follow-up care is a key part of your treatment and safety. Be sure to make and go to all appointments, and call your doctor if you are having problems. It's also a good idea to know your test results and keep a list of the medicines you take. How can you care for yourself at home? · Take your daily medicines to help minimize long-term damage and avoid asthma attacks. · Check your peak flow every morning and evening. This is the best way to know how well your lungs are working. · Check your action plan to see what zone you are in.  ¨ If you are in the green zone, keep taking your daily asthma medicines as prescribed. ¨ If you are in the yellow zone, you may be having or will soon have an asthma attack. You may not have any symptoms, but your lungs are not working as well as they should. Take the medicines listed in your action plan. If you stay in the yellow zone, your doctor may need to increase the dose or add a medicine. ¨ If you are in the red zone, follow your action plan. If your symptoms or peak flow don't improve soon, you may need to go to the emergency room or be admitted to the hospital.  · Use an asthma diary. Write down your peak flow readings in the asthma diary. If you have an attack, write down what caused it (if you know), the symptoms, and what medicine you took.   · Make sure you know how and when to call your doctor or go to the hospital.  · Take both the asthma action plan and the asthma diary--along with your peak flow meter and medicines--when you see your doctor. Tell your doctor if you are having trouble following your action plan. When should you call for help? Call 911 anytime you think you may need emergency care. For example, call if:  · You have severe trouble breathing. Call your doctor now or seek immediate medical care if:  · Your symptoms do not get better after you have followed your asthma action plan. · You cough up yellow, dark brown, or bloody mucus (sputum). Watch closely for changes in your health, and be sure to contact your doctor if:  · Your coughing and wheezing get worse. · You need to use quick-relief medicine on more than 2 days a week (unless it is just for exercise). · You need help figuring out what is triggering your asthma attacks. Where can you learn more? Go to [employer]  Enter B511 in the search box to learn more about \"Asthma Action Plan: After Your Visit. \"   © 6858-5575 [employer]. Care instructions adapted under license by [employer] (which disclaims liability or warranty for this information). This care instruction is for use with your licensed healthcare professional. If you have questions about a medical condition or this instruction, always ask your healthcare professional. [address] any warranty or liability for your use of this information. Content Version: 71.0.277371; Last Revised: March 9, 2012            Doxycycline (By mouth)   Doxycycline (nai-n-TUA-kleen)  Treats and prevents infections. Also used to prevent malaria and treat rosacea or severe acne. This medicine is a tetracycline antibiotic.    Brand Name(s):Acticlate, Adoxa, Adoxa Ambrocio 1/150, Avidoxy, Avidoxy DK, BenzoDox 30 Kit, BenzoDox 60 Kit, Doryx, Monodox, Morgidox [id number], Morgidox [id number] Kit, Morgidox [id number], Morgidox [id number] Kit, NutriDox Convenience, Oracea   There may be other brand names for this medicine. When This Medicine Should Not Be Used: This medicine is not right for everyone. Do not use it if you had an allergic reaction to doxycycline or another tetracycline antibiotic, or if you are pregnant or breastfeeding. How to Use This Medicine:   Capsule, Delayed Release Capsule, Long Acting Capsule, Liquid, Tablet, Delayed Release Tablet  · Your doctor will tell you how much medicine to use. Do not use more than directed. · Ask your pharmacist or doctor if you need to take this medicine with or without food. Some forms can be taken with food or milk, but others must be taken on an empty stomach. · Tablets: You may take this medicine with food or milk to avoid stomach irritation. To break a tablet, hold the tablet between your thumb and index fingers close to the appropriate scored line. Then, apply enough pressure to snap the tablet segments apart. Do not use the tablet if it does not break on the scored lines. · Delayed-release tablets: You may also take this medicine by sprinkling the broken tablets onto room-temperature applesauce. Swallow this mixture right away; do not chew. Do not store the mixture for later use. · Oracea® capsules: This medicine must be taken on an empty stomach, at least 1 hour before or 2 hours after a meal.  · Capsule: Swallow whole. Do not break, crush, chew, or open it. · Oral liquid: Shake the bottle well just before each use. Measure the oral liquid medicine with a marked measuring spoon, oral syringe, or medicine cup. · Take all of the medicine in your prescription to clear up your infection, even if you feel better after the first few doses. · Drink plenty of fluids to avoid throat problems, if you take the capsule or tablet form. · Malaria prevention: Start taking the medicine 1 or 2 days before you travel. Take the medicine every day during your trip.  Keep taking it for 4 weeks after you return. However, do not use the medicine for longer than 4 months. · Do not use this medicine for more than 9 months if you are using it for rosacea. · Use only the brand of medicine your doctor prescribed. Other brands may not work the same way. · Read and follow the patient instructions that come with this medicine. Talk to your doctor or pharmacist if you have any questions. · Missed dose: Take a dose as soon as you remember. If it is almost time for your next dose, wait until then and take a regular dose. Do not take extra medicine to make up for a missed dose. · Store the medicine in a closed container at room temperature, away from heat, moisture, and direct light. Do not freeze the oral liquid. Drugs and Foods to Avoid:   Ask your doctor or pharmacist before using any other medicine, including over-the-counter medicines, vitamins, and herbal products. · Some foods and medicines can affect how doxycycline works. Tell your doctor if you are using any of the following:  ¨ Bismuth subsalicylate, isotretinoin or other acne medicines, acitretin or other medicine to treat psoriasis  ¨ Penicillin antibiotic, birth control pills, medicine for seizures (such as carbamazepine, phenobarbital, phenytoin), stomach medicine, a blood thinner (such as warfarin), or any medicine that contains aluminum, calcium, or iron (such an antacid or vitamin supplement)  Warnings While Using This Medicine:   · This medicine may cause birth defects if either partner is using it during conception or pregnancy. Tell your doctor right away if you or your partner becomes pregnant. Birth control pills may not work as well when used with this medicine. Use a second form of birth control to keep from getting pregnant. · Tell your doctor if you have kidney disease, liver disease, asthma, or an allergy to sulfites.  Tell your doctor if you had surgery on your stomach, or if you have a history of yeast infections. · This medicine may cause the following problems:  ¨ Permanent change in tooth color (in children younger than 6years old)  ¨ Increased pressure inside the head  ¨ Yeast infection  ¨ Immune system problems  · This medicine can cause diarrhea. Call your doctor if the diarrhea becomes severe, does not stop, or is bloody. Do not take any medicine to stop diarrhea until you have talked to your doctor. Diarrhea can occur 2 months or more after you stop taking this medicine. · This medicine may make your skin more sensitive to sunlight. Wear sunscreen. Do not use sunlamps or tanning beds. · Tell any doctor or dentist who treats you that you are using this medicine. This medicine may affect certain medical test results. · Call your doctor if your symptoms do not improve or if they get worse. · Keep all medicine out of the reach of children. Never share your medicine with anyone. Possible Side Effects While Using This Medicine:   Call your doctor right away if you notice any of these side effects:  · Allergic reaction: Itching or hives, swelling in your face or hands, swelling or tingling in your mouth or throat, chest tightness, trouble breathing  · Blistering, peeling, red skin rash  · Burning, pain, or irritation in your upper stomach or throat  · Diarrhea that may contain blood  · Fever, chills, cough, runny or stuffy nose, sore throat, and body aches  · Joint pain, fever, rash, and unusual tiredness or weakness  · Severe headache, dizziness, or vision changes  If you notice these less serious side effects, talk with your doctor:   · Darkening of your skin, scars, teeth, or gums  · Sores or white patches on your lips, mouth, or throat  If you notice other side effects that you think are caused by this medicine, tell your doctor. Call your doctor for medical advice about side effects.  You may report side effects to FDA at 6-235-FDA-0246  © 2016 [address] is for End User's use only and may not be sold, redistributed or otherwise used for commercial purposes. The above information is an  only. It is not intended as medical advice for individual conditions or treatments. Talk to your doctor, nurse or pharmacist before following any medical regimen to see if it is safe and effective for you. Albuterol (By breathing)   Albuterol (al-BUE-ter-ol)  Treats or prevents bronchospasm. Brand Name(s):AccuNeb, Novaplus Ventolin HFA, Proventil, Proventil HFA, ReliOn Ventolin HFA, Ventolin HFA   There may be other brand names for this medicine. When This Medicine Should Not Be Used: This medicine is not right for everyone. Do not use it if you had an allergic reaction to albuterol or milk proteins. How to Use This Medicine:   Aerosol, Powder Under Pressure, Solution  · Your doctor will tell you how much medicine to use. Do not use more than directed. Ask your doctor or pharmacist if you have questions about how to use your inhaler, nebulizer, or medicine. · Solution:   ¨ You will use this medicine with an inhaler device called a nebulizer. The nebulizer turns the medicine into a fine mist that you breathe in through your mouth and to your lungs. Your caregiver will show you how to use your nebulizer. ¨ Store unopened vials of this medicine at room temperature, away from heat and direct light. Do not freeze. An open vial of medicine must be used right away. · Aerosol inhaler:   ¨ Shake the inhaler well just before each use. Avoid spraying this medicine into your eyes. Test spray in the air before using for the first time or if the inhaler has not been used for a while. ¨ If you are supposed to use more than one puff, wait 1 to 2 minutes before inhaling the second puff. Repeat these steps for the next puff, starting with shaking the inhaler. ¨ Clean the inhaler mouthpiece at least once a week with warm running water for 30 seconds. Let it air dry completely.   ¨ Store the canister at room temperature, away from heat and direct light. Do not freeze. Do not keep this medicine inside a car where it could be exposed to extreme heat or cold. Do not poke holes in the canister or throw it into a fire, even if the canister is empty. · ProAir® Respiclick inhaler:   ¨ Make sure the cap is closed. Do not open the cap unless you are going to use the medicine. ¨ Hold the inhaler upright. Open the cap fully until you hear a click. Your inhaler is now ready to use. ¨ Close the cap firmly over the mouthpiece after each use. ¨ Keep the inhaler clean and dry at all times. Do not wash or put any part of the inhaler in water. ¨ To clean the mouthpiece, wipe it gently with a dry cloth or tissue. ¨ Keep the medicine in the foil pouch until you are ready to use it. Store at room temperature, away from heat and direct light. Do not freeze. · Read and follow the patient instructions that come with this medicine. Talk to your doctor or pharmacist if you have any questions. · Missed dose: Take a dose as soon as you remember. If it is almost time for your next dose, wait until then and take a regular dose. Do not take extra medicine to make up for a missed dose. Drugs and Foods to Avoid:   Ask your doctor or pharmacist before using any other medicine, including over-the-counter medicines, vitamins, and herbal products. · Some medicines can affect how albuterol works. Tell your doctor if you are using any of the following:  ¨ Digoxin  ¨ Beta-blocker  ¨ Diuretic (water pill)  ¨ Medicine for depression or an MAO inhibitor within the past 2 weeks  Warnings While Using This Medicine:   · Tell your doctor if you are pregnant or breastfeeding, or if you have kidney disease, diabetes, heart disease, heart rhythm problems, high blood pressure, overactive thyroid, or a history of seizures.   · This medicine may cause the following problems:   ¨ Paradoxical bronchospasm (increased trouble breathing right after use)  ¨ Low potassium levels in the blood  · If you use a corticosteroid medicine to control your asthma, keep using it as instructed by your doctor. · Call your doctor if your symptoms do not improve or if they get worse. · If any of your asthma medicines do not seem to be working as well as usual, call your doctor right away. Do not change your doses or stop using your medicines without asking your doctor. · Your doctor will check your progress and the effects of this medicine at regular visits. Keep all appointments. · Keep all medicine out of the reach of children. Never share your medicine with anyone. Possible Side Effects While Using This Medicine:   Call your doctor right away if you notice any of these side effects:  · Allergic reaction: Itching or hives, swelling in your face or hands, swelling or tingling in your mouth or throat, chest tightness, trouble breathing  · Dry mouth, increased thirst, muscle cramps, nausea, vomiting  · Fast, pounding, or uneven heartbeat, chest pain  · Lightheadedness, dizziness, or fainting  · Trouble breathing, increased wheezing, cough, chest tightness  If you notice these less serious side effects, talk with your doctor:   · Cough, sore throat, runny or stuffy nose  · Headache  · Tremors, nervousness  If you notice other side effects that you think are caused by this medicine, tell your doctor. Call your doctor for medical advice about side effects. You may report side effects to FDA at 6-776-FDA-5271  © 2016 [address] is for End User's use only and may not be sold, redistributed or otherwise used for commercial purposes. The above information is an  only. It is not intended as medical advice for individual conditions or treatments. Talk to your doctor, nurse or pharmacist before following any medical regimen to see if it is safe and effective for you.

## 2017-03-14 LAB
S PYO AG THROAT QL: NEGATIVE
VALID INTERNAL CONTROL?: YES

## 2017-03-31 ENCOUNTER — HOSPITAL ENCOUNTER (EMERGENCY)
Age: 37
Discharge: HOME | End: 2017-03-31
Attending: EMERGENCY MEDICINE
Payer: SELF-PAY

## 2017-03-31 VITALS
BODY MASS INDEX: 33.29 KG/M2 | RESPIRATION RATE: 20 BRPM | HEIGHT: 64 IN | HEART RATE: 100 BPM | DIASTOLIC BLOOD PRESSURE: 74 MMHG | WEIGHT: 195 LBS | SYSTOLIC BLOOD PRESSURE: 128 MMHG | OXYGEN SATURATION: 98 % | TEMPERATURE: 98.1 F

## 2017-03-31 DIAGNOSIS — B34.9 VIRAL SYNDROME: Primary | ICD-10-CM

## 2017-03-31 LAB
APPEARANCE UR: CLEAR
BILIRUB UR QL: NEGATIVE
COLOR UR: YELLOW
FLUAV AG NPH QL IA: NEGATIVE
FLUBV AG NOSE QL IA: NEGATIVE
GLUCOSE UR STRIP.AUTO-MCNC: NEGATIVE MG/DL
HCG UR QL: NEGATIVE
HGB UR QL STRIP: NEGATIVE
KETONES UR QL STRIP.AUTO: NEGATIVE MG/DL
LEUKOCYTE ESTERASE UR QL STRIP.AUTO: NEGATIVE
NITRITE UR QL STRIP.AUTO: NEGATIVE
PH UR STRIP: 6 [PH] (ref 5–8)
PROT UR STRIP-MCNC: NEGATIVE MG/DL
SP GR UR REFRACTOMETRY: 1.01 (ref 1–1.03)
UROBILINOGEN UR QL STRIP.AUTO: 0.2 EU/DL (ref 0.2–1)

## 2017-03-31 PROCEDURE — 87804 INFLUENZA ASSAY W/OPTIC: CPT | Performed by: EMERGENCY MEDICINE

## 2017-03-31 PROCEDURE — 99283 EMERGENCY DEPT VISIT LOW MDM: CPT

## 2017-03-31 PROCEDURE — 81025 URINE PREGNANCY TEST: CPT | Performed by: EMERGENCY MEDICINE

## 2017-03-31 PROCEDURE — 74011250637 HC RX REV CODE- 250/637: Performed by: EMERGENCY MEDICINE

## 2017-03-31 PROCEDURE — 81003 URINALYSIS AUTO W/O SCOPE: CPT | Performed by: EMERGENCY MEDICINE

## 2017-03-31 RX ORDER — ONDANSETRON 4 MG/1
4 TABLET, ORALLY DISINTEGRATING ORAL
Status: COMPLETED | OUTPATIENT
Start: 2017-03-31 | End: 2017-03-31

## 2017-03-31 RX ORDER — ONDANSETRON 4 MG/1
TABLET, ORALLY DISINTEGRATING ORAL
Qty: 10 TAB | Refills: 0 | Status: SHIPPED | OUTPATIENT
Start: 2017-03-31 | End: 2017-04-12 | Stop reason: ALTCHOICE

## 2017-03-31 RX ORDER — VALACYCLOVIR HYDROCHLORIDE 500 MG/1
500 TABLET, FILM COATED ORAL DAILY
COMMUNITY
End: 2018-01-31 | Stop reason: SDUPTHER

## 2017-03-31 RX ADMIN — ONDANSETRON 4 MG: 4 TABLET, ORALLY DISINTEGRATING ORAL at 13:22

## 2017-03-31 NOTE — ED NOTES
Pt states ready for discharge. Pt states she will follow up with PCP as instructed by provider. Pt appears in NOAD. I have reviewed discharge instructions with the patient. Prescriptions were reviewed with patient instructed not to drink alcohol, drive a car, or operate heavy machinery while taking this medicine. The patient verbalized understanding. Patient seen leaving ED ambulatory without difficulty or need for assistance, with S/O in no sign of distress. Patient armband removed and shredded    Current Discharge Medication List      START taking these medications    Details   ondansetron (ZOFRAN ODT) 4 mg disintegrating tablet Take 1-2 tablets every 6-8 hours as needed for nausea and vomiting.   Qty: 10 Tab, Refills: 0

## 2017-03-31 NOTE — LETTER
NOTIFICATION RETURN TO WORK / SCHOOL 
 
3/31/2017 1:40 PM 
 
Ms. Timothy Adler [address] [address] To Whom It May Concern: 
 
Timothy Adler is currently under the care of [id number] [facility] EMERGENCY DEPT. She will return to work/school on: 4/3/17 If there are questions or concerns please have the patient contact our office. Sincerely, 
 
 
Dr. Eloisa Garcia

## 2017-03-31 NOTE — ED PROVIDER NOTES
HPI Comments: 12:24 PM Zuly Dorsey is a 42 y.o. female with h/o asthma, IBS, bipolar 1 disorder and reactive airway disease who presents to ED complaining of diffuse abdominal pain with NVD onset last night. Pt also complains of chills and diaphoresis. Pt has had 15 episodes of diarrhea between last night and this morning. The pt vomited twice last night. Pt denies fever. She admits several coworkers have the flu. Pt admits to smoking cigarettes, but denies illicit drug use or ETOH consumption. No other concerns nor complaints at this time. PCP: Jeni Bryson DO      The history is provided by the patient. Past Medical History:   Diagnosis Date    Asthma     Bipolar 1 disorder ([facility] [address]     Followed by [facility], depression and anxiety    Genital HSV     IBS (irritable bowel syndrome)     Onychomycosis     was on lamisol for this 3 mos ago. Was on it for 6 mos.  Psychiatric disorder     Reactive airway disease     Tinea pedis        Past Surgical History:   Procedure Laterality Date    HX COLONOSCOPY      age 22 for diarrhea    HX CYST REMOVAL      hand    HX GYN      HX ORTHOPAEDIC      right hand    HX OVARIAN CYST REMOVAL      right ovary         Family History:   Problem Relation Age of Onset    Arthritis-osteo Mother     Migraines Mother     Stroke Mother     Alcohol abuse Father     Cancer Father     Hypertension Father     Asthma Sister     Diabetes Sister     Heart Disease Sister     Stroke Maternal Grandmother     Diabetes Maternal Grandfather     COPD Maternal Grandfather     No Known Problems Paternal Grandmother     No Known Problems Paternal Grandfather        Social History     Social History    Marital status: SINGLE     Spouse name: N/A    Number of children: N/A    Years of education: N/A     Occupational History    Not on file.      Social History Main Topics    Smoking status: Current Every Day Smoker     Packs/day: 2.00 Years: 23.00     Types: Cigarettes     Start date: 2/9/1992    Smokeless tobacco: Never Used    Alcohol use 2.5 oz/week     5 Cans of beer per week      Comment: 2x week and on weekends: 1-6 drinks/sitting    Drug use: No    Sexual activity: Yes      Comment: condoms     Other Topics Concern    Not on file     Social History Narrative         ALLERGIES: Prozac [fluoxetine]    Review of Systems   Constitutional: Positive for chills and diaphoresis. Negative for fatigue, fever and unexpected weight change. HENT: Negative for congestion and rhinorrhea. Respiratory: Negative for chest tightness and shortness of breath. Cardiovascular: Negative for chest pain, palpitations and leg swelling. Gastrointestinal: Positive for abdominal pain (diffuse), diarrhea, nausea and vomiting. Genitourinary: Negative for dysuria. Musculoskeletal: Negative for back pain. Skin: Negative for rash. Neurological: Negative for dizziness and weakness. Psychiatric/Behavioral: The patient is not nervous/anxious. All other systems reviewed and are negative. Vitals:    03/31/17 1130   BP: 128/74   Pulse: 100   Resp: 20   Temp: 98.1 °F (36.7 °C)   SpO2: 98%   Weight: 88.5 kg (195 lb)   Height: 5' 4\" (1.626 m)            Physical Exam   Constitutional: She appears well-developed and well-nourished. Non-toxic appearance. She does not have a sickly appearance. She appears ill. No distress. HENT:   Head: Normocephalic and atraumatic. Mouth/Throat: Oropharynx is clear and moist. No oropharyngeal exudate. Eyes: Conjunctivae and EOM are normal. Pupils are equal, round, and reactive to light. No scleral icterus. Neck: Trachea normal and normal range of motion. Neck supple. No hepatojugular reflux and no JVD present. No tracheal deviation present. No thyromegaly present. Cardiovascular: Normal rate, regular rhythm, S1 normal, S2 normal, normal heart sounds, intact distal pulses and normal pulses.   Exam reveals no gallop, no S3 and no S4. No murmur heard. Pulses:       Radial pulses are 2+ on the right side, and 2+ on the left side. Dorsalis pedis pulses are 2+ on the right side, and 2+ on the left side. Pulmonary/Chest: Effort normal and breath sounds normal. No accessory muscle usage. No respiratory distress. She has no decreased breath sounds. She has no wheezes. She has no rhonchi. She has no rales. Abdominal: Soft. Normal appearance and bowel sounds are normal. She exhibits no distension and no mass. There is no hepatosplenomegaly. There is no tenderness. There is no rigidity, no rebound, no guarding, no CVA tenderness, no tenderness at McBurney's point and negative Siddiqui's sign. Musculoskeletal: Normal range of motion. Strength 5/5 throughout    Lymphadenopathy:        Head (right side): No submental, no submandibular, no preauricular and no occipital adenopathy present. Head (left side): No submental, no submandibular, no preauricular and no occipital adenopathy present. She has no cervical adenopathy. Right: No supraclavicular adenopathy present. Left: No supraclavicular adenopathy present. Neurological: She is alert. She has normal strength and normal reflexes. She is not disoriented. No cranial nerve deficit or sensory deficit. Coordination and gait normal. GCS eye subscore is 4. GCS verbal subscore is 5. GCS motor subscore is 6. Grossly intact    Skin: Skin is warm, dry and intact. No rash noted. She is not diaphoretic. Psychiatric: She has a normal mood and affect. Her speech is normal and behavior is normal. Judgment and thought content normal. Cognition and memory are normal.   Nursing note and vitals reviewed. MDM  Number of Diagnoses or Management Options  Viral syndrome:   Diagnosis management comments: Influenza  Viral syndrome    ED Course       Procedures   Labs essentially normal. UA negative. Influenza negative.  1:35 PM 3/31/2017    I have reassessed the patient. Patient is feeling better. Patient will be prescribed Zofran. Patient was discharged in stable condition. Patient is to return to emergency department if any new or worsening condition. Scribe Attestation  Georgette Bravo scribing for and in the presence of South Hayes DO (03/31/17/ 12:23 PM)    Physician Attestation  I personally performed the services described in this documentation, reviewed, and edited the documentation which was dictated to the scribe in my presence, and it accurately records my own words and actions.      South Hayes DO (03/31/17/ 12:23 PM)    Signed by: Jonelle Beverly, 03/31/17, 12:23 PM

## 2017-03-31 NOTE — DISCHARGE INSTRUCTIONS

## 2017-04-12 ENCOUNTER — TELEPHONE (OUTPATIENT)
Dept: INTERNAL MEDICINE CLINIC | Age: 37
End: 2017-04-12

## 2017-04-12 ENCOUNTER — OFFICE VISIT (OUTPATIENT)
Dept: INTERNAL MEDICINE CLINIC | Age: 37
End: 2017-04-12

## 2017-04-12 ENCOUNTER — HOSPITAL ENCOUNTER (EMERGENCY)
Age: 37
Discharge: HOME | End: 2017-04-12
Attending: EMERGENCY MEDICINE
Payer: SELF-PAY

## 2017-04-12 ENCOUNTER — APPOINTMENT (OUTPATIENT)
Dept: GENERAL RADIOLOGY | Age: 37
End: 2017-04-12
Attending: EMERGENCY MEDICINE
Payer: SELF-PAY

## 2017-04-12 VITALS
WEIGHT: 197.8 LBS | HEART RATE: 89 BPM | OXYGEN SATURATION: 96 % | TEMPERATURE: 99 F | HEIGHT: 64 IN | BODY MASS INDEX: 33.77 KG/M2 | DIASTOLIC BLOOD PRESSURE: 70 MMHG | RESPIRATION RATE: 18 BRPM | SYSTOLIC BLOOD PRESSURE: 111 MMHG

## 2017-04-12 VITALS
HEART RATE: 85 BPM | DIASTOLIC BLOOD PRESSURE: 66 MMHG | OXYGEN SATURATION: 95 % | RESPIRATION RATE: 18 BRPM | SYSTOLIC BLOOD PRESSURE: 116 MMHG | TEMPERATURE: 98.1 F

## 2017-04-12 DIAGNOSIS — L21.9 SEBORRHEIC DERMATITIS: ICD-10-CM

## 2017-04-12 DIAGNOSIS — S62.662A CLOSED NONDISPLACED FRACTURE OF DISTAL PHALANX OF RIGHT MIDDLE FINGER, INITIAL ENCOUNTER: Primary | ICD-10-CM

## 2017-04-12 DIAGNOSIS — M79.644 PAIN OF RIGHT MIDDLE FINGER: Primary | ICD-10-CM

## 2017-04-12 PROCEDURE — 99283 EMERGENCY DEPT VISIT LOW MDM: CPT

## 2017-04-12 PROCEDURE — 77030008323 HC SPLNT FNGR GTR DJOR -A

## 2017-04-12 PROCEDURE — 73140 X-RAY EXAM OF FINGER(S): CPT

## 2017-04-12 RX ORDER — OXYCODONE AND ACETAMINOPHEN 5; 325 MG/1; MG/1
1 TABLET ORAL
Qty: 12 TAB | Refills: 0 | Status: SHIPPED | OUTPATIENT
Start: 2017-04-12 | End: 2017-07-14 | Stop reason: ALTCHOICE

## 2017-04-12 RX ORDER — KETOCONAZOLE 20 MG/ML
SHAMPOO TOPICAL
Qty: 1 BOTTLE | Refills: 11 | Status: SHIPPED | OUTPATIENT
Start: 2017-04-12 | End: 2017-04-12 | Stop reason: SDUPTHER

## 2017-04-12 RX ORDER — ZIPRASIDONE HYDROCHLORIDE 60 MG/1
80 CAPSULE ORAL 2 TIMES DAILY WITH MEALS
COMMUNITY
End: 2018-06-08 | Stop reason: DRUGHIGH

## 2017-04-12 RX ORDER — KETOCONAZOLE 20 MG/ML
SHAMPOO TOPICAL
Qty: 1 BOTTLE | Refills: 11 | Status: SHIPPED | OUTPATIENT
Start: 2017-04-12 | End: 2018-06-08 | Stop reason: ALTCHOICE

## 2017-04-12 RX ORDER — ZIPRASIDONE HYDROCHLORIDE 20 MG/1
20 CAPSULE ORAL DAILY
COMMUNITY
End: 2018-06-08 | Stop reason: DRUGHIGH

## 2017-04-12 RX ORDER — QUETIAPINE FUMARATE 50 MG/1
50 TABLET, EXTENDED RELEASE ORAL DAILY
COMMUNITY
End: 2019-10-04 | Stop reason: SDUPTHER

## 2017-04-12 NOTE — ED PROVIDER NOTES
HPI Comments: Patient is a 41 y/o female who presents to the ER c/o right middle finger pain. Patient states she slammed her finger in a door at home last night. Patient is right hand dominant. She tried using ice last night with little relief in the pain and swelling. Patient denied any numbness, tingling, however states it hurts if she tries to bend her finger. Patient denied all other injuries or complaints. Patient is a 42 y.o. female presenting with finger pain. The history is provided by the patient. Finger Pain    This is a new problem. The current episode started yesterday. Past Medical History:   Diagnosis Date    Asthma     Bipolar 1 disorder ([address]     [facility], depression and anxiety    Genital HSV     IBS (irritable bowel syndrome)     Onychomycosis     was on lamisol for this 3 mos ago. Was on it for 6 mos.  Psychiatric disorder     Reactive airway disease     Tinea pedis        Past Surgical History:   Procedure Laterality Date    HX COLONOSCOPY      age 22 for diarrhea    HX CYST REMOVAL      hand    HX GYN      HX ORTHOPAEDIC      right hand    HX OVARIAN CYST REMOVAL      right ovary         Family History:   Problem Relation Age of Onset    Arthritis-osteo Mother     Migraines Mother     Stroke Mother     Alcohol abuse Father     Cancer Father     Hypertension Father     Asthma Sister     Diabetes Sister     Heart Disease Sister     Stroke Maternal Grandmother     Diabetes Maternal Grandfather     COPD Maternal Grandfather     No Known Problems Paternal Grandmother     No Known Problems Paternal Grandfather        Social History     Social History    Marital status: SINGLE     Spouse name: N/A    Number of children: N/A    Years of education: N/A     Occupational History    Not on file.      Social History Main Topics    Smoking status: Current Every Day Smoker     Packs/day: 2.00     Years: 23.00     Types: Cigarettes     Start date: 2/9/1992    Smokeless tobacco: Never Used    Alcohol use 2.5 oz/week     5 Cans of beer per week      Comment: 2x week and on weekends: 1-6 drinks/sitting    Drug use: No    Sexual activity: Yes      Comment: condoms     Other Topics Concern    Not on file     Social History Narrative         ALLERGIES: Prozac [fluoxetine]    Review of Systems   Constitutional: Negative for chills, fatigue and fever. HENT: Negative. Negative for sore throat. Eyes: Negative. Respiratory: Negative for cough and shortness of breath. Cardiovascular: Negative for chest pain and palpitations. Gastrointestinal: Negative for abdominal pain, nausea and vomiting. Genitourinary: Negative for dysuria. Musculoskeletal: Positive for arthralgias. Right middle finger pain/swelling   Skin: Negative. Neurological: Negative for dizziness, weakness, light-headedness and headaches. Psychiatric/Behavioral: Negative. All other systems reviewed and are negative. Vitals:    04/12/17 1621   BP: 116/66   Pulse: 85   Resp: 18   Temp: 98.1 °F (36.7 °C)   SpO2: 95%            Physical Exam   Constitutional: She is oriented to person, place, and time. She appears well-developed and well-nourished. No distress. HENT:   Head: Normocephalic and atraumatic. Mouth/Throat: Oropharynx is clear and moist.   Eyes: Conjunctivae are normal. No scleral icterus. Neck: Neck supple. No JVD present. No tracheal deviation present. Cardiovascular: Normal rate, regular rhythm and normal heart sounds. Pulmonary/Chest: Effort normal and breath sounds normal. No respiratory distress. She has no wheezes. Abdominal: Soft. She exhibits no distension. There is no tenderness. Musculoskeletal: She exhibits edema and tenderness. Right hand: She exhibits decreased range of motion, tenderness and bony tenderness. She exhibits normal capillary refill.         Hands:  Neurological: She is alert and oriented to person, place, and time. She has normal strength. Gait normal. GCS eye subscore is 4. GCS verbal subscore is 5. GCS motor subscore is 6. Skin: Skin is warm and dry. She is not diaphoretic. Psychiatric: She has a normal mood and affect. Nursing note and vitals reviewed. MDM  Number of Diagnoses or Management Options  Closed nondisplaced fracture of distal phalanx of right middle finger, initial encounter:   Diagnosis management comments: 5:22 PM  43 y/o female c/o isolated middle finger injury on right hand; slammed door on hand last night. Has tried ice with little relief. XR obtained and noted to show closed distal phalynx fracture of right middle finger; will plan for finger splint and ortho follow up. Discussed results with pt. All questions answered and patient in agreement with plan of care. Will plan for discharge.   Antonio Enciso PA-C    Clinical Impression:  Right hand, middle finger distal phalynx closed non-displaced judy fracture       Amount and/or Complexity of Data Reviewed  Tests in the radiology section of CPT®: ordered and reviewed    Risk of Complications, Morbidity, and/or Mortality  Presenting problems: low  Diagnostic procedures: low  Management options: low    Patient Progress  Patient progress: stable    ED Course       Procedures

## 2017-04-12 NOTE — ED TRIAGE NOTES
Pt injured finger in door last pm, states she saw PMD  Today who ordered out pt xray, pt states she she can not wait   On the results so she came here for eval and xray of finger

## 2017-04-12 NOTE — MR AVS SNAPSHOT
Visit Information Date & Time Provider Department Dept. Phone Encounter #  
 4/12/2017  3:00 PM Halle Mcneil MD Internist of [facility] [id number] Follow-up Instructions Return if symptoms worsen or fail to improve. Upcoming Health Maintenance Date Due DTaP/Tdap/Td series (1 - Tdap) 1/25/1999 PAP AKA CERVICAL CYTOLOGY 7/22/2018 Allergies as of 4/12/2017  Review Complete On: 4/12/2017 By: Desiree Hernandez Severity Noted Reaction Type Reactions Prozac [Fluoxetine] Low 02/11/2012   Not Verified Unknown (comments), Other (comments) Current Immunizations  Reviewed on 9/26/2016 Name Date Influenza Vaccine Tyra Santiago) 9/26/2016 PPD 11/15/2010 Pneumococcal Polysaccharide (PPSV-23) 9/26/2016 Not reviewed this visit You Were Diagnosed With   
  
 Codes Comments Pain of right middle finger    -  Primary ICD-10-CM: M36.336 ICD-9-CM: 729.5 Seborrheic dermatitis     ICD-10-CM: L21.9 ICD-9-CM: 690.10 Vitals BP Pulse Temp Resp Height(growth percentile) Weight(growth percentile) 111/70 (BP 1 Location: Left arm, BP Patient Position: Sitting) 89 99 °F (37.2 °C) (Oral) 18 5' 4\" (1.626 m) 197 lb 12.8 oz (89.7 kg) SpO2 BMI OB Status Smoking Status 96% 33.95 kg/m2 Medically Induced Current Every Day Smoker BMI and BSA Data Body Mass Index Body Surface Area 33.95 kg/m 2 2.01 m 2 Preferred Pharmacy Pharmacy Name Phone [facility], [address] [phone] Your Updated Medication List  
  
   
This list is accurate as of: 4/12/17  3:56 PM.  Always use your most recent med list.  
  
  
  
  
 ADDERALL 10 mg tablet Generic drug:  dextroamphetamine-amphetamine Take 10 mg by mouth three (3) times daily. albuterol 90 mcg/actuation inhaler Commonly known as:  PROVENTIL HFA, VENTOLIN HFA, PROAIR HFA  
 Take 2 Puffs by inhalation every four (4) hours as needed for Wheezing. beclomethasone 80 mcg/actuation [place] Commonly known as:  QVAR INHALE 1 PUFF TWO TIMES A DAY Biotin 2,500 mcg Cap Take  by mouth daily. BUSPAR PO Take 10 mg by mouth two (2) times a day. * GEODON 20 mg capsule Generic drug:  ziprasidone Take 20 mg by mouth daily. In AM  
  
 * GEODON 60 mg capsule Generic drug:  ziprasidone hcl Take 60 mg by mouth nightly. inhalational spacing device 1 Each by Does Not Apply route as needed. ketoconazole 2 % shampoo Commonly known as:  NIZORAL Apply 5 to 10 mL to wet scalp, lather, leave on 5 min, & rinse; apply once every 1 to 2 wks (prophylaxis) or twice weekly for 2 to 4 wks. KlonoPIN 1 mg tablet Generic drug:  clonazePAM  
Take 1 mg by mouth daily as needed. LAMICTAL PO Take  by mouth daily. lidocaine 4 % topical cream  
Commonly known as:  XYLOCAINE Apply  to affected area two (2) times daily as needed for Pain. naproxen 250 mg tablet Commonly known as:  NAPROSYN  
TAKE ONE TABLET TWO TIMES A DAY WITH MEALS  
  
 omeprazole 20 mg capsule Commonly known as:  PRILOSEC Take 1 Cap by mouth daily. * SEROquel 50 mg tablet Generic drug:  QUEtiapine Take 50 mg by mouth daily. * SEROquel XR 50 mg sr tablet Generic drug:  QUEtiapine SR Take 50 mg by mouth daily. VALTREX 500 mg tablet Generic drug:  valACYclovir Take 500 mg by mouth daily. ZOLOFT 100 mg tablet Generic drug:  sertraline Take 100 mg by mouth daily. * Notice: This list has 4 medication(s) that are the same as other medications prescribed for you. Read the directions carefully, and ask your doctor or other care provider to review them with you. Prescriptions Printed Refills  
 ketoconazole (NIZORAL) 2 % shampoo 11  Sig: Apply 5 to 10 mL to wet scalp, lather, leave on 5 min, & rinse; apply once every 1 to 2 wks (prophylaxis) or twice weekly for 2 to 4 wks. Class: Print Follow-up Instructions Return if symptoms worsen or fail to improve. To-Do List   
 04/12/2017 Imaging:  XR HAND RT MIN 3 V Patient Instructions Health Maintenance Due Topic Date Due  
 DTaP/Tdap/Td series (1 - Tdap) 01/25/1999 Seborrheic Dermatitis: Care Instructions Your Care Instructions Seborrheic dermatitis (say \"sxq-esk-YBB-ick xky-ucf-GS-tus\") is a skin problem that causes a reddish rash with greasy, flaky, yellow skin patches. The rash may appear on many parts of the body. It may be on the scalp, face (especially the eyebrow area and between the nose and mouth), ears, breasts, underarms, and genital area. The flaky skin on the scalp is called dandruff. This rash is often a long-term (chronic) condition. It may last for years. But the symptoms may come and go. Symptoms can be treated with special creams, shampoos, or other skin care. The cause of seborrheic dermatitis is not fully understood. It may occur when skin glands make too much oil. It may get worse in cold weather or with stress. A type of skin fungus, or yeast, may also be linked with this condition. Follow-up care is a key part of your treatment and safety. Be sure to make and go to all appointments, and call your doctor if you are having problems. It's also a good idea to know your test results and keep a list of the medicines you take. How can you care for yourself at home? · If your doctor prescribes a steroid cream, dandruff shampoo, or antifungal cream or medicine, use it as directed. If your doctor prescribes other medicine, take it as directed. · Use a dandruff shampoo if seborrheic dermatitis affects your scalp. This includes Head & Shoulders, Sebulex, and Selsun Blue. You may need to try a few kinds of shampoo to find the one that works best for you. · To help with itching: ¨ Use hydrocortisone cream. Follow the directions on the label. ¨ Use cold, wet cloths. ¨ Take an over-the-counter antihistamine, such as diphenhydramine (Benadryl) or loratadine (Claritin). Read and follow all instructions on the label. When should you call for help? Call your doctor now or seek immediate medical care if: · The rash gets worse or spreads to other parts of your body. · You have signs of infection, such as: 
¨ Increased pain, swelling, warmth, or redness. ¨ Red streaks leading from the rash. ¨ Pus draining from the rash. ¨ A fever. · You have crusting or oozing sores. · You have joint aches or body aches along with your rash. Watch closely for changes in your health, and be sure to contact your doctor if: 
· Your symptoms do not get better with home treatment. · Itching interferes with your sleep or daily activities. Where can you learn more? Go to http://tommie-nicole.info/. Enter R829 in the search box to learn more about \"Seborrheic Dermatitis: Care Instructions. \" Current as of: October 13, 2016 Content Version: 11.2 © 9229-7538 [employer]. Care instructions adapted under license by [employer] (which disclaims liability or warranty for this information). If you have questions about a medical condition or this instruction, always ask your healthcare professional. [place] [address] any warranty or liability for your use of this information. Introducing [facility] HE[facility]! [facility] introduces Distil Interactive patient portal. Now you can access parts of your medical record, email your doctor's office, and request medication refills online. 1. In your internet browser, go to [email or web address] 2. Click on the First Time User? Click Here link in the Sign In box. You will see the New Member Sign Up page. 3. Enter your Distil Interactive Access Code exactly as it appears below.  You will not need to use this code after youve completed the sign-up process. If you do not sign up before the expiration date, you must request a new code. · SOLOMO365 Access Code: [email or web address] Expires: 7/11/2017  3:56 PM 
 
4. Enter the last four digits of your Social Security Number (xxxx) and Date of Birth (mm/dd/yyyy) as indicated and click Submit. You will be taken to the next sign-up page. 5. Create a SOLOMO365 ID. This will be your SOLOMO365 login ID and cannot be changed, so think of one that is secure and easy to remember. 6. Create a SOLOMO365 password. You can change your password at any time. 7. Enter your Password Reset Question and Answer. This can be used at a later time if you forget your password. 8. Enter your e-mail address. You will receive e-mail notification when new information is available in [address]. 9. Click Sign Up. You can now view and download portions of your medical record. 10. Click the Download Summary menu link to download a portable copy of your medical information. If you have questions, please visit the Frequently Asked Questions section of the SOLOMO365 website. Remember, SOLOMO365 is NOT to be used for urgent needs. For medical emergencies, dial 911. Now available from your iPhone and Android! Please provide this summary of care documentation to your next provider. Your primary care clinician is listed as Garo Becker. If you have any questions after today's visit, please call [phone].

## 2017-04-12 NOTE — PROGRESS NOTES
Chief Complaint   Patient presents with    Finger Pain     Right Hand 3rd digit injury caused by closing the door onto her finger      1. Have you been to the ER, urgent care clinic since your last visit? Hospitalized since your last visit? Yes When: 3-31-17 Where: [facility] ER Reason: Influenza     2. Have you seen or consulted any other health care providers outside of the [facility] since your last visit? Include any pap smears or colon screening.  No

## 2017-04-12 NOTE — LETTER
[facility] EMERGENCY DEPT 
[address] [facility] [address] [id number] [phone] Work/School Note Date: 4/12/2017 To Whom It May concern: 
 
Tayler Pichardo was seen and treated today in the emergency room by the following provider(s): 
Attending Provider: Shyann Sellers MD 
Physician Assistant: Amna Hatch PA-C. Tayler Pichardo may return to work on 4/17/2017. Sincerely, Amna Hatch PA-C

## 2017-04-12 NOTE — ED NOTES
Current Discharge Medication List      START taking these medications    Details   oxyCODONE-acetaminophen (PERCOCET) 5-325 mg per tablet Take 1 Tab by mouth every six (6) hours as needed for Pain. Max Daily Amount: 4 Tabs. Qty: 12 Tab, Refills: 0         CONTINUE these medications which have NOT CHANGED    Details   QUEtiapine SR (SEROQUEL XR) 50 mg sr tablet Take 50 mg by mouth daily. !! ziprasidone (GEODON) 20 mg capsule Take 20 mg by mouth daily. In AM      !! ziprasidone hcl (GEODON) 60 mg capsule Take 60 mg by mouth nightly.      ketoconazole (NIZORAL) 2 % shampoo Apply 5 to 10 mL to wet scalp, lather, leave on 5 min, & rinse; apply once every 1 to 2 wks (prophylaxis) or twice weekly for 2 to 4 wks. Qty: 1 Bottle, Refills: 11    Associated Diagnoses: Seborrheic dermatitis      valACYclovir (VALTREX) 500 mg tablet Take 500 mg by mouth daily. sertraline (ZOLOFT) 100 mg tablet Take 100 mg by mouth daily. dextroamphetamine-amphetamine (ADDERALL) 10 mg tablet Take 10 mg by mouth three (3) times daily. omeprazole (PRILOSEC) 20 mg capsule Take 1 Cap by mouth daily. Qty: 30 Cap, Refills: 1    Associated Diagnoses: Heart burn      albuterol (PROVENTIL HFA, VENTOLIN HFA, PROAIR HFA) 90 mcg/actuation inhaler Take 2 Puffs by inhalation every four (4) hours as needed for Wheezing. Qty: 1 Inhaler, Refills: 2    Associated Diagnoses: Mild persistent asthma without complication      beclomethasone (QVAR) 80 mcg/actuation inhaler INHALE 1 PUFF TWO TIMES A DAY  Qty: 8.7 g, Refills: 5      LAMOTRIGINE (LAMICTAL PO) Take  by mouth daily. Biotin 2,500 mcg cap Take  by mouth daily. inhalational spacing device 1 Each by Does Not Apply route as needed. Qty: 1 Device, Refills: 1    Associated Diagnoses: Asthma, mild persistent, with acute exacerbation      lidocaine (XYLOCAINE) 4 % topical cream Apply  to affected area two (2) times daily as needed for Pain.   Qty: 15 g, Refills: 0    Associated Diagnoses: Herpes simplex      naproxen (NAPROSYN) 250 mg tablet TAKE ONE TABLET TWO TIMES A DAY WITH MEALS  Qty: 60 Tab, Refills: 0      QUEtiapine (SEROQUEL) 50 mg tablet Take 50 mg by mouth daily. BUSPIRONE HCL (BUSPAR PO) Take 10 mg by mouth two (2) times a day. clonazePAM (KLONOPIN) 1 mg tablet Take 1 mg by mouth daily as needed. !! - Potential duplicate medications found. Please discuss with provider.         Patient armband removed and shredded  Prescription given and reviewed with patient

## 2017-04-12 NOTE — PATIENT INSTRUCTIONS
Health Maintenance Due   Topic Date Due    DTaP/Tdap/Td series (1 - Tdap) 01/25/1999          Seborrheic Dermatitis: Care Instructions  Your Care Instructions  Seborrheic dermatitis (say \"glf-vbf-EBG-ick cth-abm-RR-tus\") is a skin problem that causes a reddish rash with greasy, flaky, yellow skin patches. The rash may appear on many parts of the body. It may be on the scalp, face (especially the eyebrow area and between the nose and mouth), ears, breasts, underarms, and genital area. The flaky skin on the scalp is called dandruff. This rash is often a long-term (chronic) condition. It may last for years. But the symptoms may come and go. Symptoms can be treated with special creams, shampoos, or other skin care. The cause of seborrheic dermatitis is not fully understood. It may occur when skin glands make too much oil. It may get worse in cold weather or with stress. A type of skin fungus, or yeast, may also be linked with this condition. Follow-up care is a key part of your treatment and safety. Be sure to make and go to all appointments, and call your doctor if you are having problems. It's also a good idea to know your test results and keep a list of the medicines you take. How can you care for yourself at home? · If your doctor prescribes a steroid cream, dandruff shampoo, or antifungal cream or medicine, use it as directed. If your doctor prescribes other medicine, take it as directed. · Use a dandruff shampoo if seborrheic dermatitis affects your scalp. This includes Head & Shoulders, Sebulex, and Selsun Blue. You may need to try a few kinds of shampoo to find the one that works best for you. · To help with itching:  ¨ Use hydrocortisone cream. Follow the directions on the label. ¨ Use cold, wet cloths. ¨ Take an over-the-counter antihistamine, such as diphenhydramine (Benadryl) or loratadine (Claritin). Read and follow all instructions on the label. When should you call for help?   Call your doctor now or seek immediate medical care if:  · The rash gets worse or spreads to other parts of your body. · You have signs of infection, such as:  ¨ Increased pain, swelling, warmth, or redness. ¨ Red streaks leading from the rash. ¨ Pus draining from the rash. ¨ A fever. · You have crusting or oozing sores. · You have joint aches or body aches along with your rash. Watch closely for changes in your health, and be sure to contact your doctor if:  · Your symptoms do not get better with home treatment. · Itching interferes with your sleep or daily activities. Where can you learn more? Go to http://tommie-nicole.info/. Enter M874 in the search box to learn more about \"Seborrheic Dermatitis: Care Instructions. \"  Current as of: October 13, 2016  Content Version: 11.2  © 5710-1124 nodishes.co.uk. Care instructions adapted under license by [employer] (which disclaims liability or warranty for this information). If you have questions about a medical condition or this instruction, always ask your healthcare professional. [address] any warranty or liability for your use of this information.

## 2017-04-12 NOTE — DISCHARGE INSTRUCTIONS
Finger Fracture: Care Instructions  Your Care Instructions    Breaks in the bones of the finger usually heal well in about 3 to 4 weeks. The pain and swelling from a broken finger can last for weeks. But it should steadily improve, starting a few days after you break it. It is very important that you wear and take care of the cast or splint exactly as your doctor tells you to so that your finger heals properly and does not end up crooked. Wearing a splint may interfere with your normal activities. Ask for help with daily tasks if you need it. You heal best when you take good care of yourself. Eat a variety of healthy foods, and don't smoke. Follow-up care is a key part of your treatment and safety. Be sure to make and go to all appointments, and call your doctor if you are having problems. It's also a good idea to know your test results and keep a list of the medicines you take. How can you care for yourself at home? · If your doctor put a splint on your finger, wear the splint exactly as directed. Do not remove it until your doctor says that you can. · Keep your hand raised above the level of your heart as much as you can. This will help reduce swelling. · Put ice or a cold pack on your finger for 10 to 20 minutes at a time. Try to do this every 1 to 2 hours for the next 3 days (when you are awake) or until the swelling goes down. Put a thin cloth between the ice and your skin. Keep the splint dry. · Be safe with medicines. Take pain medicines exactly as directed. ¨ If the doctor gave you a prescription medicine for pain, take it as prescribed. ¨ If you are not taking a prescription pain medicine, ask your doctor if you can take an over-the-counter medicine. When should you call for help? Call 911 anytime you think you may need emergency care. For example, call if:  · Your finger is cool or pale or changes color.   Call your doctor now or seek immediate medical care if:  · Your pain gets much worse.  · You have tingling, weakness, or numbness in your finger. · You have signs of infection, such as:  ¨ Increased pain, swelling, warmth, or redness. ¨ Red streaks leading from the area. ¨ Pus draining from the area. ¨ Swollen lymph nodes in your neck, armpits, or groin. ¨ A fever. Watch closely for changes in your health, and be sure to contact your doctor if:  · Your finger is not steadily improving. Where can you learn more? Go to http://tommie-nicole.info/. Enter K326 in the search box to learn more about \"Finger Fracture: Care Instructions. \"  Current as of: May 23, 2016  Content Version: 11.2  © 1890-9564 [employer]. Care instructions adapted under license by [employer] (which disclaims liability or warranty for this information). If you have questions about a medical condition or this instruction, always ask your healthcare professional. [place] [address] any warranty or liability for your use of this information.

## 2017-04-13 PROBLEM — F90.9 ADHD (ATTENTION DEFICIT HYPERACTIVITY DISORDER): Status: ACTIVE | Noted: 2017-04-13

## 2017-04-13 PROBLEM — M77.50 BONE SPUR OF FOOT: Status: ACTIVE | Noted: 2017-04-13

## 2017-04-13 PROBLEM — F31.9 BIPOLAR DISORDER WITHOUT PSYCHOTIC FEATURES (HCC): Status: ACTIVE | Noted: 2017-04-13

## 2017-04-13 PROBLEM — E55.9 VITAMIN D DEFICIENCY: Status: ACTIVE | Noted: 2017-04-13

## 2017-04-13 PROBLEM — K57.90 DIVERTICULOSIS: Status: ACTIVE | Noted: 2017-04-13

## 2017-04-13 PROBLEM — K21.9 GERD (GASTROESOPHAGEAL REFLUX DISEASE): Status: ACTIVE | Noted: 2017-04-13

## 2017-04-13 NOTE — PROGRESS NOTES
[facility] OF [facility]:  4/13/2017, MRN: [id number]      Cyndie Sams is a 42 y.o. female and presents to clinic for Finger Pain (Right Hand 3rd digit injury caused by closing the door onto her finger )    Subjective:   Patient is a 38-year-old female with history of ADHD, GERD, vitamin D deficiency, diverticulosis per CT scan, bipolar disorder, bone spurs on feet, smoking, and herpes simplex (per EHR). 1. Finger pain: Last night, patient accidentally slammed her right hand third digit into a door. Since then she has had increasing swelling, redness,  and throbbing pain. No alleviating factors are known. Range of motion is limited. 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 She has significant pain with range of motion along that digit. 2. Scalp Rash: Rash on patient has a rash along the upper hairline over the past couple of weeks. She is able to scrape yellow to white patches off her scalp with no pain associated with this. No alleviating factors are known. No aggravating factors are known. Patient Active Problem List    Diagnosis Date Noted    ADHD (attention deficit hyperactivity disorder) 04/13/2017    GERD (gastroesophageal reflux disease) 04/13/2017    Vitamin D deficiency 04/13/2017    Diverticulosis seen on CT 04/13/2017    Bipolar disorder without psychotic features  04/13/2017    Bone spurs on feet 04/13/2017    Tobacco dependence 06/09/2015    Herpes simplex 02/11/2012       Current Outpatient Prescriptions   Medication Sig Dispense Refill    QUEtiapine SR (SEROQUEL XR) 50 mg sr tablet Take 50 mg by mouth daily.  ziprasidone (GEODON) 20 mg capsule Take 20 mg by mouth daily. In AM      ziprasidone hcl (GEODON) 60 mg capsule Take 60 mg by mouth nightly.       ketoconazole (NIZORAL) 2 % shampoo Apply 5 to 10 mL to wet scalp, lather, leave on 5 min, & rinse; apply once every 1 to 2 wks (prophylaxis) or twice weekly for 2 to 4 wks. 1 Bottle 11    valACYclovir (VALTREX) 500 mg tablet Take 500 mg by mouth daily.  sertraline (ZOLOFT) 100 mg tablet Take 100 mg by mouth daily.  dextroamphetamine-amphetamine (ADDERALL) 10 mg tablet Take 10 mg by mouth three (3) times daily.  omeprazole (PRILOSEC) 20 mg capsule Take 1 Cap by mouth daily. 30 Cap 1    albuterol (PROVENTIL HFA, VENTOLIN HFA, PROAIR HFA) 90 mcg/actuation inhaler Take 2 Puffs by inhalation every four (4) hours as needed for Wheezing. 1 Inhaler 2    beclomethasone (QVAR) 80 mcg/actuation inhaler INHALE 1 PUFF TWO TIMES A DAY 8.7 g 5    LAMOTRIGINE (LAMICTAL PO) Take  by mouth daily.  Biotin 2,500 mcg cap Take  by mouth daily.  inhalational spacing device 1 Each by Does Not Apply route as needed. 1 Device 1    lidocaine (XYLOCAINE) 4 % topical cream Apply  to affected area two (2) times daily as needed for Pain. 15 g 0    naproxen (NAPROSYN) 250 mg tablet TAKE ONE TABLET TWO TIMES A DAY WITH MEALS 60 Tab 0    QUEtiapine (SEROQUEL) 50 mg tablet Take 50 mg by mouth daily.  BUSPIRONE HCL (BUSPAR PO) Take 10 mg by mouth two (2) times a day.  clonazePAM (KLONOPIN) 1 mg tablet Take 1 mg by mouth daily as needed.  oxyCODONE-acetaminophen (PERCOCET) 5-325 mg per tablet Take 1 Tab by mouth every six (6) hours as needed for Pain. Max Daily Amount: 4 Tabs. 12 Tab 0       Allergies   Allergen Reactions    Prozac [Fluoxetine] Unknown (comments) and Other (comments)       Past Medical History:   Diagnosis Date    Asthma     Bipolar 1 disorder ([address]     Followed by [facility], depression and anxiety    Genital HSV     GERD (gastroesophageal reflux disease) 4/13/2017    IBS (irritable bowel syndrome)     Onychomycosis     was on lamisol for this 3 mos ago. Was on it for 6 mos.      Psychiatric disorder     Reactive airway disease     Tinea pedis        Past Surgical History:   Procedure Laterality Date    HX COLONOSCOPY age 22 for diarrhea    HX CYST REMOVAL      hand    HX GYN      HX ORTHOPAEDIC      right hand    HX OVARIAN CYST REMOVAL      right ovary       Family History   Problem Relation Age of Onset    Arthritis-osteo Mother     Migraines Mother     Stroke Mother     Alcohol abuse Father     Cancer Father     Hypertension Father     Asthma Sister     Diabetes Sister     Heart Disease Sister     Stroke Maternal Grandmother     Diabetes Maternal Grandfather     COPD Maternal Grandfather     No Known Problems Paternal Grandmother     No Known Problems Paternal Grandfather        Social History   Substance Use Topics    Smoking status: Current Every Day Smoker     Packs/day: 2.00     Years: 23.00     Types: Cigarettes     Start date: 2/9/1992    Smokeless tobacco: Never Used    Alcohol use 2.5 oz/week     5 Cans of beer per week      Comment: 2x week and on weekends: 1-6 drinks/sitting       ROS   Review of Systems   Constitutional: Negative for chills and fever. HENT: Negative for ear pain and sore throat. Eyes: Negative for blurred vision and pain. Respiratory: Negative for cough and shortness of breath. Cardiovascular: Negative for chest pain. Gastrointestinal: Negative for abdominal pain. Genitourinary: Negative for dysuria. Musculoskeletal: Positive for joint pain. Negative for myalgias. Skin: Negative for rash. Neurological: Negative for tingling, focal weakness and headaches. Endo/Heme/Allergies: Does not bruise/bleed easily. Psychiatric/Behavioral: Negative for substance abuse. Objective     Vitals:    04/12/17 1514   BP: 111/70   Pulse: 89   Resp: 18   Temp: 99 °F (37.2 °C)   TempSrc: Oral   SpO2: 96%   Weight: 197 lb 12.8 oz (89.7 kg)   Height: 5' 4\" (1.626 m)   PainSc:   9   PainLoc: Finger   LMP: 04/05/2017       Physical Exam   Constitutional: She is oriented to person, place, and time and well-developed, well-nourished, and in no distress.    HENT:   Head: Normocephalic and atraumatic. Right Ear: External ear normal.   Left Ear: External ear normal.   Nose: Nose normal.   Mouth/Throat: Oropharynx is clear and moist. No oropharyngeal exudate. Eyes: Conjunctivae and EOM are normal. Pupils are equal, round, and reactive to light. Right eye exhibits no discharge. Left eye exhibits no discharge. No scleral icterus. Neck: Neck supple. Cardiovascular: Normal rate, regular rhythm, normal heart sounds and intact distal pulses. Exam reveals no gallop and no friction rub. No murmur heard. Pulmonary/Chest: Effort normal and breath sounds normal. No respiratory distress. She has no wheezes. She has no rales. Abdominal: Soft. Bowel sounds are normal. She exhibits no distension. There is no tenderness. There is no rebound and no guarding. Musculoskeletal: She exhibits edema (pt's right hand DIP jt of the 3rd digit is swollen). BUE are NTTP except along her right hand 3rd digit DIP and PIP jts   Lymphadenopathy:     She has no cervical adenopathy. Neurological: She is alert and oriented to person, place, and time. She exhibits normal muscle tone. Gait normal.   Skin: Skin is warm and dry. Ecchymosis along DIP of right hand 3rd digit. Findings c/w Seb. dermatitis are present along upper hair line   Psychiatric: Affect normal.   Nursing note and vitals reviewed.       LABS   Data Review:   Lab Results   Component Value Date/Time    WBC 9.3 09/13/2016 03:20 PM    HGB 13.7 09/13/2016 03:20 PM    HCT 40.6 09/13/2016 03:20 PM    PLATELET 901 32/30/6213 03:20 PM    MCV 92.9 09/13/2016 03:20 PM       Lab Results   Component Value Date/Time    Sodium 142 09/13/2016 03:20 PM    Potassium 4.2 09/13/2016 03:20 PM    Chloride 107 09/13/2016 03:20 PM    CO2 26 09/13/2016 03:20 PM    Anion gap 9 09/13/2016 03:20 PM    Glucose 79 09/13/2016 03:20 PM    BUN 6 09/13/2016 03:20 PM    Creatinine 0.64 09/13/2016 03:20 PM    BUN/Creatinine ratio 9 09/13/2016 03:20 PM    GFR est AA >60 09/13/2016 03:20 PM    GFR est non-AA >60 09/13/2016 03:20 PM    Calcium 8.8 09/13/2016 03:20 PM       Lab Results   Component Value Date/Time    Cholesterol, total 187 07/07/2015 04:26 PM    HDL Cholesterol 35 07/07/2015 04:26 PM    LDL, calculated 112 07/07/2015 04:26 PM    VLDL, calculated 40 07/07/2015 04:26 PM    Triglyceride 198 07/07/2015 04:26 PM    CHOL/HDL Ratio 5.8 10/18/2012 01:30 PM       Lab Results   Component Value Date/Time    Hemoglobin A1c 5.4 10/18/2012 01:30 PM       Assessment/Plan:   1. Pain of right middle finger: Must r/o fracture. -Right hand x-ray ordered     ORDERS:  - XR HAND RT MIN 3 V; Future    2. Seborrheic dermatitis:   -Ketoconazole shampoo prescribed and patient counseled on the cause of seborrheic dermatitis. ORDERS:  - ketoconazole (NIZORAL) 2 % shampoo; Apply 5 to 10 mL to wet scalp, lather, leave on 5 min, & rinse; apply once every 1 to 2 wks (prophylaxis) or twice weekly for 2 to 4 wks. Dispense: 1 Bottle; Refill: 11      Health Maintenance Due   Topic Date Due    DTaP/Tdap/Td series (1 - Tdap) 01/25/1999       Lab review: labs are reviewed    I have discussed the diagnosis with the patient and the intended plan as seen in the above orders. The patient has received an after-visit summary and questions were answered concerning future plans. I have discussed medication side effects and warnings with the patient as well. I have reviewed the plan of care with the patient, accepted their input and they are in agreement with the treatment goals. All questions were answered. The patient understands the plan of care. Handouts provided today with above information. Pt instructed if symptoms worsen to call the office or report to the ED for continued care. Greater than 50% of the visit time was spent in counseling and/or coordination of care. Follow-up Disposition:  Return if symptoms worsen or fail to improve.     Marisela Alcazar MD

## 2017-05-19 DIAGNOSIS — R12 HEART BURN: ICD-10-CM

## 2017-05-19 RX ORDER — PHENOL/SODIUM PHENOLATE
AEROSOL, SPRAY (ML) MUCOUS MEMBRANE
Qty: 30 TAB | Refills: 11 | Status: SHIPPED | OUTPATIENT
Start: 2017-05-19 | End: 2018-01-31 | Stop reason: SDUPTHER

## 2017-06-06 ENCOUNTER — HOSPITAL ENCOUNTER (OUTPATIENT)
Dept: MAMMOGRAPHY | Age: 37
Discharge: HOME | End: 2017-06-06
Attending: OBSTETRICS & GYNECOLOGY
Payer: MEDICAID

## 2017-06-06 ENCOUNTER — HOSPITAL ENCOUNTER (OUTPATIENT)
Dept: ULTRASOUND IMAGING | Age: 37
Discharge: HOME | End: 2017-06-06
Attending: OBSTETRICS & GYNECOLOGY
Payer: MEDICAID

## 2017-06-06 DIAGNOSIS — N63.0 BREAST MASS: ICD-10-CM

## 2017-06-06 DIAGNOSIS — R92.8 ABNORMAL MAMMOGRAM: ICD-10-CM

## 2017-06-06 DIAGNOSIS — Z12.31 VISIT FOR SCREENING MAMMOGRAM: ICD-10-CM

## 2017-06-06 DIAGNOSIS — N64.4 BREAST PAIN: ICD-10-CM

## 2017-06-06 PROCEDURE — 76642 ULTRASOUND BREAST LIMITED: CPT

## 2017-06-06 PROCEDURE — 77066 DX MAMMO INCL CAD BI: CPT

## 2017-06-12 ENCOUNTER — DOCUMENTATION ONLY (OUTPATIENT)
Dept: SURGERY | Age: 37
End: 2017-06-12

## 2017-06-12 DIAGNOSIS — Z91.89 AT HIGH RISK FOR BREAST CANCER: Primary | ICD-10-CM

## 2017-06-12 DIAGNOSIS — Z80.3 FAMILY HISTORY OF BREAST CANCER: ICD-10-CM

## 2017-06-12 NOTE — PROGRESS NOTES
Please let the pt know that I ordered a breast MRI which is recommended annually as her lifetime breast cancer risk is estimated at >20%.     Dr. Ray Cabello  [facility] [place] [address]  Phone: [phone]  Fax: [phone]

## 2017-06-12 NOTE — PROGRESS NOTES
LM to notify patient that Dr. Arnol Max has ordered a breast MRI for her as it is recommended that she have one yearly based on cancer risk scoring.

## 2017-07-14 ENCOUNTER — TELEPHONE (OUTPATIENT)
Dept: PULMONOLOGY | Age: 37
End: 2017-07-14

## 2017-07-14 ENCOUNTER — TELEPHONE (OUTPATIENT)
Dept: INTERNAL MEDICINE CLINIC | Age: 37
End: 2017-07-14

## 2017-07-14 ENCOUNTER — OFFICE VISIT (OUTPATIENT)
Dept: INTERNAL MEDICINE CLINIC | Age: 37
End: 2017-07-14

## 2017-07-14 VITALS
SYSTOLIC BLOOD PRESSURE: 112 MMHG | DIASTOLIC BLOOD PRESSURE: 68 MMHG | HEART RATE: 77 BPM | BODY MASS INDEX: 33.29 KG/M2 | WEIGHT: 195 LBS | HEIGHT: 64 IN | TEMPERATURE: 98.4 F | OXYGEN SATURATION: 97 % | RESPIRATION RATE: 16 BRPM

## 2017-07-14 DIAGNOSIS — R05.9 COUGH: Primary | ICD-10-CM

## 2017-07-14 DIAGNOSIS — F17.200 TOBACCO DEPENDENCE: ICD-10-CM

## 2017-07-14 PROBLEM — J44.9 COPD WITH ASTHMA (HCC): Status: ACTIVE | Noted: 2017-07-14

## 2017-07-14 RX ORDER — DOXYCYCLINE 100 MG/1
100 TABLET ORAL 2 TIMES DAILY
Qty: 20 TAB | Refills: 0 | Status: CANCELLED | OUTPATIENT
Start: 2017-07-14 | End: 2017-07-24

## 2017-07-14 RX ORDER — ALBUTEROL SULFATE 90 UG/1
2 AEROSOL, METERED RESPIRATORY (INHALATION)
Qty: 1 INHALER | Refills: 3 | Status: SHIPPED | OUTPATIENT
Start: 2017-07-14 | End: 2019-08-27

## 2017-07-14 RX ORDER — BENZONATATE 100 MG/1
100 CAPSULE ORAL
Qty: 21 CAP | Refills: 3 | Status: SHIPPED | OUTPATIENT
Start: 2017-07-14 | End: 2017-11-13 | Stop reason: SDUPTHER

## 2017-07-14 RX ORDER — DIPHENHYDRAMINE HCL 25 MG
4 CAPSULE ORAL AS NEEDED
Qty: 440 EACH | Refills: 1 | Status: SHIPPED | OUTPATIENT
Start: 2017-07-14 | End: 2017-08-13

## 2017-07-14 NOTE — TELEPHONE ENCOUNTER
I spoke with the patients pharmacy about ordering the Nicotine gum. The script was sent into the pharmacy and I left a message letting the patient known that it was at the pharmacy for pickup.

## 2017-07-14 NOTE — PATIENT INSTRUCTIONS
Learning About Benefits From Quitting Smoking  How does quitting smoking make you healthier? If you're thinking about quitting smoking, you may have a few reasons to be smoke-free. Your health may be one of them. · When you quit smoking, you lower your risks for cancer, lung disease, heart attack, stroke, blood vessel disease, and blindness from macular degeneration. · When you're smoke-free, you get sick less often, and you heal faster. You are less likely to get colds, flu, bronchitis, and pneumonia. · As a nonsmoker, you may find that your mood is better and you are less stressed. When and how will you feel healthier? Quitting has real health benefits that start from day 1 of being smoke-free. And the longer you stay smoke-free, the healthier you get and the better you feel. The first hours  · After just 20 minutes, your blood pressure and heart rate go down. That means there's less stress on your heart and blood vessels. · Within 12 hours, the level of carbon monoxide in your blood drops back to normal. That makes room for more oxygen. With more oxygen in your body, you may notice that you have more energy than when you smoked. After 2 weeks  · Your lungs start to work better. · Your risk of heart attack starts to drop. After 1 month  · When your lungs are clear, you cough less and breathe deeper, so it's easier to be active. · Your sense of taste and smell return. That means you can enjoy food more than you have since you started smoking. Over the years  · After 1 year, your risk of heart disease is half what it would be if you kept smoking. · After 5 years, your risk of stroke starts to shrink. Within a few years after that, it's about the same as if you'd never smoked. · After 10 years, your risk of dying from lung cancer is cut by about half. And your risk for many other types of cancer is lower too. How would quitting help others in your life?   When you quit smoking, you improve the health of everyone who now breathes in your smoke. · Their heart, lung, and cancer risks drop, much like yours. · They are sick less. For babies and small children, living smoke-free means they're less likely to have ear infections, pneumonia, and bronchitis. · If you're a woman who is or will be pregnant someday, quitting smoking means a healthier . · Children who are close to you are less likely to become adult smokers. Where can you learn more? Go to http://tommie-nicole.info/. Enter 052 [id number] 72 11 in the search box to learn more about \"Learning About Benefits From Quitting Smoking. \"  Current as of: 2017  Content Version: 11.3  © 3091-6731 [employer]. Care instructions adapted under license by [employer] (which disclaims liability or warranty for this information). If you have questions about a medical condition or this instruction, always ask your healthcare professional. [place] [address] any warranty or liability for your use of this information. Controlling Your Asthma: Care Instructions  Your Care Instructions    Asthma is a long-term condition that affects your breathing. It causes the airways that lead to the lungs to swell. During an asthma attack, the airways swell and narrow. This makes it hard to breathe. You may wheeze or cough. If you have a bad attack, you may need emergency care. There are two things to do to treat asthma. · Control asthma over the long term. · Treat attacks when they occur. You and your doctor can make an asthma action plan. It tells you what medicines you need to take every day to control asthma symptoms and what to do if you have an asthma attack. Your asthma action plan can help prevent and treat attacks. When you keep your asthma under control, you can prevent severe attacks and lasting damage to your airways. You need to treat your asthma even when you are not having symptoms.  Although asthma is a lifelong disease, treatment can help control it and help you stay healthy. Follow-up care is a key part of your treatment and safety. Be sure to make and go to all appointments, and call your doctor if you are having problems. It's also a good idea to know your test results and keep a list of the medicines you take. How can you care for yourself at home? To control asthma over the long term  Medicines  Controller medicines reduce swelling in your lungs. They also prevent asthma attacks. Take your controller medicine exactly as prescribed. Talk to your doctor if you have any problems with your medicine. · Inhaled corticosteroid is a common and effective controller medicine. Using it the right way can prevent or reduce most side effects. · Take your controller medicine every day, not just when you have symptoms. It helps prevent problems before they occur. · Your doctor may prescribe another medicine that you use along with the corticosteroid. This is often a long-acting bronchodilator. Do not take this medicine by itself. Using a long-acting bronchodilator by itself can increase your risk of a severe or fatal asthma attack. · Do not take inhaled corticosteroids or long-acting bronchodilators to stop an asthma attack that has already started. They don't work fast enough to help. · Talk to your doctor before you use other medicines. Some medicines, such as aspirin, can cause asthma attacks in some people. Education  · Learn what triggers an asthma attack. Avoid these triggers when you can. Common triggers include colds, smoke, air pollution, dust, pollen, mold, pets, cockroaches, stress, and cold air. · Check yourself for asthma symptoms to know which step to follow in your action plan. Watch for things like being short of breath, having chest tightness, coughing, and wheezing. Also notice if symptoms wake you up at night or if you get tired quickly when you exercise.   · If you have a peak flow meter, use it to check how well you are breathing. It can help you know when an asthma attack is going to occur. Then you can take medicine to prevent the asthma attack or make it less severe. · Do not smoke or allow others to smoke around you. Avoid smoky places. Smoking makes asthma worse. If you need help quitting, talk to your doctor about stop-smoking programs and medicines. These can increase your chances of quitting for good. · Avoid colds and the flu. Get a pneumococcal vaccine shot. If you have had one before, ask your doctor whether you need a second dose. Get a flu vaccine every year, as soon as it's available. If you must be around people with colds or the flu, wash your hands often. To treat attacks when they occur  Use your asthma action plan when you have an attack. Your quick-relief medicine will stop an asthma attack. It relaxes the muscles that get tight around the airways. If your doctor prescribed corticosteroid pills to use during an attack, take them as directed. They may take hours to work, but they may shorten the attack and help you breathe better. · Albuterol is an effective quick-relief inhaler. · Take your quick-relief medicine exactly as prescribed. · Always bring your asthma medicine with you when you travel. · You may need to use quick-relief medicine before you exercise. · Call your doctor if you think you are having a problem with your medicine. When should you call for help? Call 911 anytime you think you may need emergency care. For example, call if:  · You are having severe trouble breathing. Call your doctor now or seek immediate medical care if:  · Your symptoms do not get better after you have followed your asthma action plan. · You cough up yellow, dark brown, or bloody mucus (sputum). Watch closely for changes in your health, and be sure to contact your doctor if:  · Your coughing and wheezing get worse.   · You need to use your quick-relief medicine on more than 2 days a week (unless it is just for exercise). · You need help figuring out what is triggering your asthma attacks. Where can you learn more? Go to http://tommie-nicole.info/. Enter Y630 in the search box to learn more about \"Controlling Your Asthma: Care Instructions. \"  Current as of: March 25, 2017  Content Version: 11.3  © 1644-9048 [employer]. Care instructions adapted under license by [employer] (which disclaims liability or warranty for this information). If you have questions about a medical condition or this instruction, always ask your healthcare professional. [address] any warranty or liability for your use of this information.

## 2017-07-14 NOTE — TELEPHONE ENCOUNTER
Pt would like to get tessalon pearls and antibiotic called in for her cough. She doesn't want to be seen just for a cough. She states she coughs because she is a smoker and is trying to quit.

## 2017-07-14 NOTE — PROGRESS NOTES
Pt is here for cough, Pt stayes it is ongoing due to smoking      1. Have you been to the ER, urgent care clinic since your last visit? Hospitalized since your last visit? No    2. Have you seen or consulted any other health care providers outside of the [facility] since your last visit? Include any pap smears or colon screening.  No

## 2017-07-14 NOTE — PROGRESS NOTES
Uyen Tadeo is a 42 y.o.  female and presents with    Chief Complaint   Patient presents with    Cough        Subjective:  Cough   The history is provided by the patient. This is a chronic problem. Episode onset: \"I smoke so I always have a cough\" Episode frequency: no specific pattern. The problem has not changed since onset. Pertinent negatives include no chest pain, no abdominal pain, no headaches and no shortness of breath. Nothing aggravates the symptoms. Nothing relieves the symptoms. Treatments tried: In the past she has used [employer] and Doxycycline and that helped. She denies seasonal allergies. She denies fever, chills, dyspnea, SOB, chest pain, and hemoptysis. Mrs. Allyson Poon does have a history chronic bronchitis related to smoking and COPD Asthma followed by Dr. Allegra Neri. Last PFT was done 7/8/2015. She has not seen him since January 2017. She denies night time awakenings and has intermittent wheezing but no more than her \"normal.\" She was unsure of what inhaler she was using and when to use each inhaler. After looking at pictures of inhalers she verbalized that she only has the Qvar at home and is out of the Albuterol. Mrs. Allyson Poon continues to smoke. Currently she is decreasing the amount of packs but still smokes daily and has been smoking more recently due to stressors (death). She has tried keeping a journal, vaped, and nicotine patches without success. She did have an episode of binge eating on Wednesday \"because I just did not eat all day\". This caused her to vomit and increased the acid reflux. She does have a history of GERD. She is using her Prilosec daily as prescribed. She was unsure of the dosage on her Geodon medication but states she has a follow up scheduled. Additional Concerns: no     ROS   Review of Systems   Constitutional: Negative for chills, diaphoresis, fever and malaise/fatigue. HENT: Negative for congestion and sore throat. Respiratory: Positive for cough, sputum production and wheezing. Negative for hemoptysis and shortness of breath. Coughing up brown sputum related to smoking   Cardiovascular: Negative for chest pain and palpitations. Gastrointestinal: Negative for abdominal pain, heartburn, nausea and vomiting. Skin: Negative for rash. Neurological: Negative for dizziness, loss of consciousness and headaches. Endo/Heme/Allergies: Negative for environmental allergies. Allergies   Allergen Reactions    Prozac [Fluoxetine] Unknown (comments) and Other (comments)       Current Outpatient Prescriptions   Medication Sig Dispense Refill    Omeprazole delayed release (PRILOSEC D/R) 20 mg tablet TAKE ONE CAPSULE BY MOUTH DAILY 30 Tab 11    QUEtiapine SR (SEROQUEL XR) 50 mg sr tablet Take 50 mg by mouth daily.  ziprasidone hcl (GEODON) 60 mg capsule Take 80 mg by mouth two (2) times daily (with meals).  sertraline (ZOLOFT) 100 mg tablet Take 100 mg by mouth daily.  dextroamphetamine-amphetamine (ADDERALL) 10 mg tablet Take 20 mg by mouth three (3) times daily.  beclomethasone (QVAR) 80 mcg/actuation inhaler INHALE 1 PUFF TWO TIMES A DAY 8.7 g 5    LAMOTRIGINE (LAMICTAL PO) Take  by mouth daily.  Biotin 2,500 mcg cap Take  by mouth daily.  QUEtiapine (SEROQUEL) 50 mg tablet Take 50 mg by mouth daily.  BUSPIRONE HCL (BUSPAR PO) Take 10 mg by mouth two (2) times a day.  ziprasidone (GEODON) 20 mg capsule Take 20 mg by mouth daily. In AM      ketoconazole (NIZORAL) 2 % shampoo Apply 5 to 10 mL to wet scalp, lather, leave on 5 min, & rinse; apply once every 1 to 2 wks (prophylaxis) or twice weekly for 2 to 4 wks. 1 Bottle 11    valACYclovir (VALTREX) 500 mg tablet Take 500 mg by mouth daily.  albuterol (PROVENTIL HFA, VENTOLIN HFA, PROAIR HFA) 90 mcg/actuation inhaler Take 2 Puffs by inhalation every four (4) hours as needed for Wheezing.  1 Inhaler 2    inhalational spacing device 1 Each by Does Not Apply route as needed. 1 Device 1    lidocaine (XYLOCAINE) 4 % topical cream Apply  to affected area two (2) times daily as needed for Pain. 15 g 0    naproxen (NAPROSYN) 250 mg tablet TAKE ONE TABLET TWO TIMES A DAY WITH MEALS 60 Tab 0    clonazePAM (KLONOPIN) 1 mg tablet Take 1 mg by mouth daily as needed. Social History     Social History    Marital status: SINGLE     Spouse name: N/A    Number of children: N/A    Years of education: N/A     Occupational History    Not on file. Social History Main Topics    Smoking status: Current Every Day Smoker     Packs/day: 2.00     Years: 23.00     Types: Cigarettes     Start date: 2/9/1992    Smokeless tobacco: Never Used    Alcohol use 2.5 oz/week     5 Cans of beer per week      Comment: 2x week and on weekends: 1-6 drinks/sitting    Drug use: No    Sexual activity: Yes      Comment: condoms     Other Topics Concern    Not on file     Social History Narrative       Past Medical History:   Diagnosis Date    Asthma     Bipolar 1 disorder ([facility] [address]     Followed by [facility], depression and anxiety    Genital HSV     GERD (gastroesophageal reflux disease) 4/13/2017    IBS (irritable bowel syndrome)     Onychomycosis     was on lamisol for this 3 mos ago. Was on it for 6 mos.      Psychiatric disorder     Reactive airway disease     Tinea pedis        Past Surgical History:   Procedure Laterality Date    HX COLONOSCOPY      age 22 for diarrhea    HX CYST REMOVAL      hand    HX GYN      HX ORTHOPAEDIC      right hand    HX OVARIAN CYST REMOVAL      right ovary       Family History   Problem Relation Age of Onset   Kevin Prior Arthritis-osteo Mother    Kevin Prior Migraines Mother    Kevin Prior Stroke Mother     Alcohol abuse Father    Friddie Vangie Father    Kevin Prior Hypertension Father     Asthma Sister     Diabetes Sister     Heart Disease Sister     Stroke Maternal Grandmother     Diabetes Maternal Grandfather     COPD Maternal Grandfather     No Known Problems Paternal Grandmother     No Known Problems Paternal Grandfather        Objective:  Vitals:    07/14/17 1259   BP: 112/68   Pulse: 77   Resp: 16   Temp: 98.4 °F (36.9 °C)   TempSrc: Oral   SpO2: 97%   Weight: 195 lb (88.5 kg)   Height: 5' 4\" (1.626 m)   PainSc:   0 - No pain   LMP: 07/04/2017       LABS   Results for orders placed or performed during the hospital encounter of 03/31/17   INFLUENZA A & B AG (RAPID TEST)   Result Value Ref Range    Influenza A Antigen NEGATIVE  NEG      Influenza B Antigen NEGATIVE  NEG     URINALYSIS W/ RFLX MICROSCOPIC   Result Value Ref Range    Color YELLOW      Appearance CLEAR      Specific gravity 1.007 1.005 - 1.030      pH (UA) 6.0 5.0 - 8.0      Protein NEGATIVE  NEG mg/dL    Glucose NEGATIVE  NEG mg/dL    Ketone NEGATIVE  NEG mg/dL    Bilirubin NEGATIVE  NEG      Blood NEGATIVE  NEG      Urobilinogen 0.2 0.2 - 1.0 EU/dL    Nitrites NEGATIVE  NEG      Leukocyte Esterase NEGATIVE  NEG     HCG URINE, QL   Result Value Ref Range    HCG urine, Ql. NEGATIVE  NEG         TESTS  Pulmonary Function Testing 7-8-2015    Impression:  Mild obstructive defect, Reduced diffusion capacity indicating a  decrease in alveolar surface area for gas exchange    PE  Physical Exam   Constitutional: She is oriented to person, place, and time. She appears well-developed and well-nourished. Cardiovascular: Normal rate, regular rhythm and normal heart sounds. Pulmonary/Chest: Effort normal and breath sounds normal. No respiratory distress. She has no wheezes. She has no rales. She exhibits no tenderness. Abdominal: Soft. Bowel sounds are normal.   Neurological: She is alert and oriented to person, place, and time. Skin: Skin is warm and dry. Psychiatric: She has a normal mood and affect. Assessment/Plan:    1. Cough- Tessalon Perles 100mg PO TID PRN. Cool mist humidifier. Refilled Albuterol inhaler.  She declined the spacer. Educated on smoking cessation. Educated on inhaler use and symptoms of asthma exacerbation. Recommended to follow up with Dr. Umair Bond for Asthma. Educated about antibiotic use. Educated about GERD and to wait 2 hours before laying down after eating. 2. Tobacco Dependence- The patient was counseled on the dangers of tobacco use, and was advised to quit. Reviewed strategies to maximize success, including removing cigarettes and smoking materials from environment, stress management, substitution of other forms of reinforcement and written materials. Lab review: no lab studies available for review at time of visit    Health Maintenance: Not addressed at this visit. I have discussed the diagnosis with the patient and the intended plan as seen in the above orders. The patient has received an after-visit summary and questions were answered concerning future plans. I have discussed medication side effects and warnings with the patient as well. I have reviewed the plan of care with the patient, accepted their input and they are in agreement with the treatment goals. Follow-up Disposition: Not on File  More than 1/2 of this 30 minute visit was spent in counseling and coordination of care, as described above.     GALE Lentz

## 2017-07-14 NOTE — MR AVS SNAPSHOT
Visit Information Date & Time Provider Department Dept. Phone Encounter #  
 7/14/2017  1:00 PM Basil Galo NP [facility] [address] [id number] Follow-up Instructions Return if symptoms worsen or fail to improve. Upcoming Health Maintenance Date Due DTaP/Tdap/Td series (1 - Tdap) 1/25/1999 INFLUENZA AGE 9 TO ADULT 8/1/2017 PAP AKA CERVICAL CYTOLOGY 7/22/2018 Allergies as of 7/14/2017  Review Complete On: 7/14/2017 By: Basil Galo NP Severity Noted Reaction Type Reactions Prozac [Fluoxetine] Low 02/11/2012   Not Verified Unknown (comments), Other (comments) Current Immunizations  Reviewed on 9/26/2016 Name Date Influenza Vaccine Fidel Bitter) 9/26/2016 PPD 11/15/2010 Pneumococcal Polysaccharide (PPSV-23) 9/26/2016 Not reviewed this visit You Were Diagnosed With   
  
 Codes Comments Cough    -  Primary ICD-10-CM: N20 ICD-9-CM: 193. 2 Vitals BP Pulse Temp Resp Height(growth percentile) Weight(growth percentile) 112/68 77 98.4 °F (36.9 °C) (Oral) 16 5' 4\" (1.626 m) 195 lb (88.5 kg) LMP SpO2 BMI OB Status Smoking Status 07/04/2017 (Approximate) 97% 33.47 kg/m2 Having regular periods Current Every Day Smoker BMI and BSA Data Body Mass Index Body Surface Area  
 33.47 kg/m 2 2 m 2 Preferred Pharmacy Pharmacy Name Phone [facility] [address] [phone] Your Updated Medication List  
  
   
This list is accurate as of: 7/14/17  1:49 PM.  Always use your most recent med list.  
  
  
  
  
 ADDERALL 10 mg tablet Generic drug:  dextroamphetamine-amphetamine Take 20 mg by mouth three (3) times daily. * albuterol 90 mcg/actuation inhaler Commonly known as:  PROVENTIL HFA, VENTOLIN HFA, PROAIR HFA Take 2 Puffs by inhalation every four (4) hours as needed for Wheezing. * albuterol 90 mcg/actuation inhaler Commonly known as:  PROVENTIL HFA, VENTOLIN HFA, PROAIR HFA Take 2 Puffs by inhalation every four (4) hours as needed for Wheezing. Indications: BRONCHOSPASM PREVENTION  
  
 beclomethasone 80 mcg/actuation Aero Commonly known as:  QVAR INHALE 1 PUFF TWO TIMES A DAY  
  
 benzonatate 100 mg capsule Commonly known as:  TESSALON Take 1 Cap by mouth three (3) times daily as needed for Cough for up to 7 days. Indications: COUGH Biotin 2,500 mcg Cap Take  by mouth daily. BUSPAR PO Take 10 mg by mouth two (2) times a day. * GEODON 20 mg capsule Generic drug:  ziprasidone Take 20 mg by mouth daily. In AM  
  
 * GEODON 60 mg capsule Generic drug:  ziprasidone hcl Take 80 mg by mouth two (2) times daily (with meals). inhalational spacing device 1 Each by Does Not Apply route as needed. ketoconazole 2 % shampoo Commonly known as:  NIZORAL Apply 5 to 10 mL to wet scalp, lather, leave on 5 min, & rinse; apply once every 1 to 2 wks (prophylaxis) or twice weekly for 2 to 4 wks. KlonoPIN 1 mg tablet Generic drug:  clonazePAM  
Take 1 mg by mouth daily as needed. LAMICTAL PO Take  by mouth daily. lidocaine 4 % topical cream  
Commonly known as:  XYLOCAINE Apply  to affected area two (2) times daily as needed for Pain. naproxen 250 mg tablet Commonly known as:  NAPROSYN  
TAKE ONE TABLET TWO TIMES A DAY WITH MEALS Omeprazole delayed release 20 mg tablet Commonly known as:  PRILOSEC D/R  
TAKE ONE CAPSULE BY MOUTH DAILY * SEROquel 50 mg tablet Generic drug:  QUEtiapine Take 50 mg by mouth daily. * SEROquel XR 50 mg sr tablet Generic drug:  QUEtiapine SR Take 50 mg by mouth daily. VALTREX 500 mg tablet Generic drug:  valACYclovir Take 500 mg by mouth daily. ZOLOFT 100 mg tablet Generic drug:  sertraline Take 100 mg by mouth daily. * Notice: This list has 6 medication(s) that are the same as other medications prescribed for you. Read the directions carefully, and ask your doctor or other care provider to review them with you. Prescriptions Sent to Pharmacy Refills  
 benzonatate (TESSALON) 100 mg capsule 3 Sig: Take 1 Cap by mouth three (3) times daily as needed for Cough for up to 7 days. Indications: COUGH Class: Normal  
 Pharmacy: [facility], [address]  Ph #: [phone] Route: Oral  
 albuterol (PROVENTIL HFA, VENTOLIN HFA, PROAIR HFA) 90 mcg/actuation inhaler 3 Sig: Take 2 Puffs by inhalation every four (4) hours as needed for Wheezing. Indications: BRONCHOSPASM PREVENTION Class: Normal  
 Pharmacy: [facility], [facility]  Ph #: [phone] Route: Inhalation Follow-up Instructions Return if symptoms worsen or fail to improve. Patient Instructions Learning About Benefits From Quitting Smoking How does quitting smoking make you healthier? If you're thinking about quitting smoking, you may have a few reasons to be smoke-free. Your health may be one of them. · When you quit smoking, you lower your risks for cancer, lung disease, heart attack, stroke, blood vessel disease, and blindness from macular degeneration. · When you're smoke-free, you get sick less often, and you heal faster. You are less likely to get colds, flu, bronchitis, and pneumonia. · As a nonsmoker, you may find that your mood is better and you are less stressed. When and how will you feel healthier? Quitting has real health benefits that start from day 1 of being smoke-free. And the longer you stay smoke-free, the healthier you get and the better you feel. The first hours · After just 20 minutes, your blood pressure and heart rate go down. That means there's less stress on your heart and blood vessels. · Within 12 hours, the level of carbon monoxide in your blood drops back to normal. That makes room for more oxygen. With more oxygen in your body, you may notice that you have more energy than when you smoked. After 2 weeks · Your lungs start to work better. · Your risk of heart attack starts to drop. After 1 month · When your lungs are clear, you cough less and breathe deeper, so it's easier to be active. · Your sense of taste and smell return. That means you can enjoy food more than you have since you started smoking. Over the years · After 1 year, your risk of heart disease is half what it would be if you kept smoking. · After 5 years, your risk of stroke starts to shrink. Within a few years after that, it's about the same as if you'd never smoked. · After 10 years, your risk of dying from lung cancer is cut by about half. And your risk for many other types of cancer is lower too. How would quitting help others in your life? When you quit smoking, you improve the health of everyone who now breathes in your smoke. · Their heart, lung, and cancer risks drop, much like yours. · They are sick less. For babies and small children, living smoke-free means they're less likely to have ear infections, pneumonia, and bronchitis. · If you're a woman who is or will be pregnant someday, quitting smoking means a healthier . · Children who are close to you are less likely to become adult smokers. Where can you learn more? Go to http://tommie-nicole.info/. Enter [id number] [id number] [id number] in the search box to learn more about \"Learning About Benefits From Quitting Smoking. \" Current as of: 2017 Content Version: 11.3 © 9265-0952 [employer]. Care instructions adapted under license by [employer] (which disclaims liability or warranty for this information).  If you have questions about a medical condition or this instruction, always ask your healthcare professional. [address] any warranty or liability for your use of this information. Controlling Your Asthma: Care Instructions Your Care Instructions Asthma is a long-term condition that affects your breathing. It causes the airways that lead to the lungs to swell. During an asthma attack, the airways swell and narrow. This makes it hard to breathe. You may wheeze or cough. If you have a bad attack, you may need emergency care. There are two things to do to treat asthma. · Control asthma over the long term. · Treat attacks when they occur. You and your doctor can make an asthma action plan. It tells you what medicines you need to take every day to control asthma symptoms and what to do if you have an asthma attack. Your asthma action plan can help prevent and treat attacks. When you keep your asthma under control, you can prevent severe attacks and lasting damage to your airways. You need to treat your asthma even when you are not having symptoms. Although asthma is a lifelong disease, treatment can help control it and help you stay healthy. Follow-up care is a key part of your treatment and safety. Be sure to make and go to all appointments, and call your doctor if you are having problems. It's also a good idea to know your test results and keep a list of the medicines you take. How can you care for yourself at home? To control asthma over the long term Medicines Controller medicines reduce swelling in your lungs. They also prevent asthma attacks. Take your controller medicine exactly as prescribed. Talk to your doctor if you have any problems with your medicine. · Inhaled corticosteroid is a common and effective controller medicine. Using it the right way can prevent or reduce most side effects. · Take your controller medicine every day, not just when you have symptoms. It helps prevent problems before they occur. · Your doctor may prescribe another medicine that you use along with the corticosteroid. This is often a long-acting bronchodilator. Do not take this medicine by itself. Using a long-acting bronchodilator by itself can increase your risk of a severe or fatal asthma attack. · Do not take inhaled corticosteroids or long-acting bronchodilators to stop an asthma attack that has already started. They don't work fast enough to help. · Talk to your doctor before you use other medicines. Some medicines, such as aspirin, can cause asthma attacks in some people. Education · Learn what triggers an asthma attack. Avoid these triggers when you can. Common triggers include colds, smoke, air pollution, dust, pollen, mold, pets, cockroaches, stress, and cold air. · Check yourself for asthma symptoms to know which step to follow in your action plan. Watch for things like being short of breath, having chest tightness, coughing, and wheezing. Also notice if symptoms wake you up at night or if you get tired quickly when you exercise. · If you have a peak flow meter, use it to check how well you are breathing. It can help you know when an asthma attack is going to occur. Then you can take medicine to prevent the asthma attack or make it less severe. · Do not smoke or allow others to smoke around you. Avoid smoky places. Smoking makes asthma worse. If you need help quitting, talk to your doctor about stop-smoking programs and medicines. These can increase your chances of quitting for good. · Avoid colds and the flu. Get a pneumococcal vaccine shot. If you have had one before, ask your doctor whether you need a second dose. Get a flu vaccine every year, as soon as it's available. If you must be around people with colds or the flu, wash your hands often. To treat attacks when they occur Use your asthma action plan when you have an attack. Your quick-relief medicine will stop an asthma attack.  It relaxes the muscles that get tight around the airways. If your doctor prescribed corticosteroid pills to use during an attack, take them as directed. They may take hours to work, but they may shorten the attack and help you breathe better. · Albuterol is an effective quick-relief inhaler. · Take your quick-relief medicine exactly as prescribed. · Always bring your asthma medicine with you when you travel. · You may need to use quick-relief medicine before you exercise. · Call your doctor if you think you are having a problem with your medicine. When should you call for help? Call 911 anytime you think you may need emergency care. For example, call if: 
· You are having severe trouble breathing. Call your doctor now or seek immediate medical care if: 
· Your symptoms do not get better after you have followed your asthma action plan. · You cough up yellow, dark brown, or bloody mucus (sputum). Watch closely for changes in your health, and be sure to contact your doctor if: 
· Your coughing and wheezing get worse. · You need to use your quick-relief medicine on more than 2 days a week (unless it is just for exercise). · You need help figuring out what is triggering your asthma attacks. Where can you learn more? Go to http://tommie-nicole.info/. Enter S510 in the search box to learn more about \"Controlling Your Asthma: Care Instructions. \" Current as of: March 25, 2017 Content Version: 11.3 © 1133-2546 [employer]. Care instructions adapted under license by [employer] (which disclaims liability or warranty for this information). If you have questions about a medical condition or this instruction, always ask your healthcare professional. [address] any warranty or liability for your use of this information. Introducing [facility]!    
 Jim Evans introduces [employer] patient portal. Now you can access parts of your medical record, email your doctor's office, and request medication refills online. 1. In your internet browser, go to [email or web address] 2. Click on the First Time User? Click Here link in the Sign In box. You will see the New Member Sign Up page. 3. Enter your [employer] Access Code exactly as it appears below. You will not need to use this code after youve completed the sign-up process. If you do not sign up before the expiration date, you must request a new code. · [employer] Access Code: [email or web address] Expires: 10/12/2017  1:49 PM 
 
4. Enter the last four digits of your Social Security Number (xxxx) and Date of Birth (mm/dd/yyyy) as indicated and click Submit. You will be taken to the next sign-up page. 5. Create a [employer] ID. This will be your [employer] login ID and cannot be changed, so think of one that is secure and easy to remember. 6. Create a [employer] password. You can change your password at any time. 7. Enter your Password Reset Question and Answer. This can be used at a later time if you forget your password. 8. Enter your e-mail address. You will receive e-mail notification when new information is available in [address] 9. Click Sign Up. You can now view and download portions of your medical record. 10. Click the Download Summary menu link to download a portable copy of your medical information. If you have questions, please visit the Frequently Asked Questions section of the [employer] website. Remember, [employer] is NOT to be used for urgent needs. For medical emergencies, dial 911. Now available from your iPhone and Android! Please provide this summary of care documentation to your next provider. Your primary care clinician is listed as Andra Yeboah. If you have any questions after today's visit, please call [phone].

## 2017-07-14 NOTE — TELEPHONE ENCOUNTER
Spoke with pt. She states her cough is from smoking. They pharmacy has e-filed to Dr. Allegra Neri both the [facility] and [facility]. Advised pt Dr. Allegra Neri not in office until Monday. Advised Delsym or cough lozenges until he returns Monday but not sure if he will rx these medications for cough due to smoking. No other sxs.  Pt understands Monday Dr. Allegra Neri will address Rx's requested

## 2017-07-14 NOTE — TELEPHONE ENCOUNTER
Pt said her pharmacy told her that her ins would cover the chewing gum to stop smoking.   4-5 packs a month- [id number]

## 2017-07-17 RX ORDER — BENZONATATE 200 MG/1
CAPSULE ORAL
Qty: 21 CAP | Refills: 1 | Status: SHIPPED | OUTPATIENT
Start: 2017-07-17 | End: 2017-10-04

## 2017-10-04 ENCOUNTER — HOSPITAL ENCOUNTER (EMERGENCY)
Age: 37
Discharge: HOME | End: 2017-10-04
Attending: EMERGENCY MEDICINE | Admitting: EMERGENCY MEDICINE
Payer: MEDICAID

## 2017-10-04 ENCOUNTER — APPOINTMENT (OUTPATIENT)
Dept: GENERAL RADIOLOGY | Age: 37
End: 2017-10-04
Attending: EMERGENCY MEDICINE
Payer: MEDICAID

## 2017-10-04 VITALS
BODY MASS INDEX: 32.15 KG/M2 | RESPIRATION RATE: 16 BRPM | HEIGHT: 65 IN | WEIGHT: 193 LBS | TEMPERATURE: 98.5 F | DIASTOLIC BLOOD PRESSURE: 48 MMHG | SYSTOLIC BLOOD PRESSURE: 96 MMHG | HEART RATE: 69 BPM | OXYGEN SATURATION: 98 %

## 2017-10-04 DIAGNOSIS — S20.212A CONTUSION OF RIB ON LEFT SIDE, INITIAL ENCOUNTER: Primary | ICD-10-CM

## 2017-10-04 DIAGNOSIS — R19.7 DIARRHEA, UNSPECIFIED TYPE: ICD-10-CM

## 2017-10-04 PROCEDURE — 99282 EMERGENCY DEPT VISIT SF MDM: CPT

## 2017-10-04 PROCEDURE — 71101 X-RAY EXAM UNILAT RIBS/CHEST: CPT

## 2017-10-04 RX ORDER — NAPROXEN 375 MG/1
375 TABLET ORAL 2 TIMES DAILY WITH MEALS
Qty: 30 TAB | Refills: 0 | Status: SHIPPED | OUTPATIENT
Start: 2017-10-04 | End: 2017-10-09

## 2017-10-04 RX ORDER — LOPERAMIDE HCL 2 MG
2 TABLET ORAL
Qty: 12 TAB | Refills: 0 | Status: SHIPPED | OUTPATIENT
Start: 2017-10-04 | End: 2017-10-14

## 2017-10-04 RX ORDER — TRAMADOL HYDROCHLORIDE 50 MG/1
50 TABLET ORAL
Qty: 12 TAB | Refills: 0 | Status: SHIPPED | OUTPATIENT
Start: 2017-10-04 | End: 2018-01-31 | Stop reason: ALTCHOICE

## 2017-10-04 NOTE — ED TRIAGE NOTES
Patient states cough for years. States recent fall into trash can. States left side rib pain s/p fall. States trip and fall incident. Denies striking head during fall. States diarrhea today.

## 2017-10-04 NOTE — ED PROVIDER NOTES
HPI Comments: 4:57 PM Travis Mancia is a 42 y.o. female with a history of IBS, reactive airway disease, and asthma who presents to ED for the evaluation of left sided rib pain rated 8/10 after falling into a trash can yesterday. Pt says pain worsens when she coughs, laughs, and has deep breath. Associated Sx include diaphoresis and chills. Pt explains worsening diarrhea 3-4 times a day due to chronic IBS and no appetite. Denies nausea, vomiting, urinary Sx, recent travel, and head injury during fall. Pt current everyday smoker. No other complaints, associated symptoms or modifying factors at this time. PCP: Tata Penny MD            The history is provided by the patient. Past Medical History:   Diagnosis Date    Asthma     Bipolar 1 disorder ([facility] [address]     Follow[facility] by [facility], depression and anxiety    Genital HSV     GERD (gastroesophageal reflux disease) 4/13/2017    IBS (irritable bowel syndrome)     Onychomycosis     was on lamisol for this 3 mos ago. Was on it for 6 mos.      Psychiatric disorder     Reactive airway disease     Tinea pedis        Past Surgical History:   Procedure Laterality Date    HX COLONOSCOPY      age 22 for diarrhea    HX CYST REMOVAL      hand    HX GYN      HX ORTHOPAEDIC      right hand    HX OVARIAN CYST REMOVAL      right ovary         Family History:   Problem Relation Age of Onset    Arthritis-osteo Mother     Migraines Mother     Stroke Mother     Alcohol abuse Father     Cancer Father     Hypertension Father     Asthma Sister     Diabetes Sister     Heart Disease Sister     Stroke Maternal Grandmother     Diabetes Maternal Grandfather     COPD Maternal Grandfather     No Known Problems Paternal Grandmother     No Known Problems Paternal Grandfather        Social History     Social History    Marital status: SINGLE     Spouse name: N/A    Number of children: N/A    Years of education: N/A     Occupational History    Not on file. Social History Main Topics    Smoking status: Current Every Day Smoker     Packs/day: 2.00     Years: 23.00     Types: Cigarettes     Start date: 2/9/1992    Smokeless tobacco: Never Used    Alcohol use 2.5 oz/week     5 Cans of beer per week      Comment: 2x week and on weekends: 1-6 drinks/sitting    Drug use: No    Sexual activity: Yes      Comment: condoms     Other Topics Concern    Not on file     Social History Narrative         ALLERGIES: Prozac [fluoxetine]    Review of Systems   Constitutional: Positive for chills and diaphoresis. Negative for fever. HENT: Negative for sore throat. Respiratory: Positive for cough. Negative for shortness of breath. Cardiovascular: Negative for chest pain. Gastrointestinal: Positive for diarrhea. Negative for nausea and vomiting. Genitourinary: Negative. Musculoskeletal:        Left sided rib pain   Skin: Negative for rash. Neurological: Negative for headaches. Vitals:    10/04/17 1648 10/04/17 1831 10/04/17 1832   BP: 104/69 96/48    Pulse: 69     Resp: 16     Temp: 98.5 °F (36.9 °C)     SpO2: 98%  98%   Weight: 87.5 kg (193 lb)     Height: 5' 4.5\" (1.638 m)              Physical Exam   Constitutional: She is oriented to person, place, and time. She appears well-developed and well-nourished. No distress. HENT:   Head: Normocephalic and atraumatic. Eyes: No scleral icterus. Cardiovascular: Normal rate. Pulmonary/Chest: Effort normal. She exhibits tenderness (L ant inf chest wall w/o crepitance of visible bruising. ). Abdominal: Soft. Bowel sounds are normal. She exhibits no distension. There is no tenderness. There is no rebound and no guarding. Musculoskeletal: She exhibits no edema or tenderness. Neurological: She is alert and oriented to person, place, and time. Skin: Skin is warm and dry. Psychiatric: She has a normal mood and affect. Nursing note and vitals reviewed.        MDM  Number of Diagnoses or Management Options  Contusion of rib on left side, initial encounter:   Diarrhea, unspecified type:   Diagnosis management comments: IMP: Rib contusion. Chronic diarrhea. Bipolar disorder. ED Course       Procedures    Vitals:  Patient Vitals for the past 12 hrs:   Temp Pulse Resp BP SpO2   10/04/17 1832 - - - - 98 %   10/04/17 1831 - - - 96/48 -   10/04/17 1648 98.5 °F (36.9 °C) 69 16 104/69 98 %         Medications ordered:   Medications - No data to display      Lab findings:  No results found for this or any previous visit (from the past 12 hour(s)). EKG interpretation by ED Physician:      X-Ray, CT or other radiology findings or impressions:  No results found. Per my reading rib and chest neg for acute findings      Progress notes, Consult notes or additional Procedure notes:       Disposition:  Diagnosis:   1. Contusion of rib on left side, initial encounter    2. Diarrhea, unspecified type      1) Preliminary x-ray readings negative for fracture  2) Treat diarrhea with bland diet, clear liquids, and Imodium  3) Tramadol and Naproxen for rib pain  4) Follow up with prescribing MD for Lamictal refill  5) Return as needed for acutely worsening symptoms    Disposition: home    Follow-up Information     Follow up With Details Comments [address] [phone]             Patient's Medications   Start Taking    LOPERAMIDE (IMODIUM A-D) 2 MG TABLET    Take 1 Tab by mouth four (4) times daily as needed for Diarrhea for up to 10 days. NAPROXEN (NAPROSYN) 375 MG TABLET    Take 1 Tab by mouth two (2) times daily (with meals). TRAMADOL (ULTRAM) 50 MG TABLET    Take 1 Tab by mouth every six (6) hours as needed for Pain. Max Daily Amount: 200 mg.    Continue Taking    ALBUTEROL (PROVENTIL HFA, VENTOLIN HFA, PROAIR HFA) 90 MCG/ACTUATION INHALER    Take 2 Puffs by inhalation every four (4) hours as needed for Wheezing. ALBUTEROL (PROVENTIL HFA, VENTOLIN HFA, PROAIR HFA) 90 MCG/ACTUATION INHALER    Take 2 Puffs by inhalation every four (4) hours as needed for Wheezing. Indications: BRONCHOSPASM PREVENTION    BECLOMETHASONE (QVAR) 80 MCG/ACTUATION INHALER    INHALE 1 PUFF TWO TIMES A DAY    BIOTIN 2,500 MCG CAP    Take  by mouth daily. BUSPIRONE HCL (BUSPAR PO)    Take 10 mg by mouth two (2) times a day. CLONAZEPAM (KLONOPIN) 1 MG TABLET    Take 1 mg by mouth daily as needed. DEXTROAMPHETAMINE-AMPHETAMINE (ADDERALL) 10 MG TABLET    Take 20 mg by mouth three (3) times daily. INHALATIONAL SPACING DEVICE    1 Each by Does Not Apply route as needed. KETOCONAZOLE (NIZORAL) 2 % SHAMPOO    Apply 5 to 10 mL to wet scalp, lather, leave on 5 min, & rinse; apply once every 1 to 2 wks (prophylaxis) or twice weekly for 2 to 4 wks. LAMOTRIGINE (LAMICTAL PO)    Take  by mouth daily. LIDOCAINE (XYLOCAINE) 4 % TOPICAL CREAM    Apply  to affected area two (2) times daily as needed for Pain. OMEPRAZOLE DELAYED RELEASE (PRILOSEC D/R) 20 MG TABLET    TAKE ONE CAPSULE BY MOUTH DAILY    QUETIAPINE (SEROQUEL) 50 MG TABLET    Take 50 mg by mouth daily. QUETIAPINE SR (SEROQUEL XR) 50 MG SR TABLET    Take 50 mg by mouth daily. SERTRALINE (ZOLOFT) 100 MG TABLET    Take 100 mg by mouth daily. VALACYCLOVIR (VALTREX) 500 MG TABLET    Take 500 mg by mouth daily. ZIPRASIDONE (GEODON) 20 MG CAPSULE    Take 20 mg by mouth daily. In AM    ZIPRASIDONE HCL (GEODON) 60 MG CAPSULE    Take 80 mg by mouth two (2) times daily (with meals).    These Medications have changed    No medications on file   Stop Taking    BENZONATATE (TESSALON) 200 MG CAPSULE    TAKE ONE CAPSULE 3 TIMES A DAY AS NEEDED FOR COUGH FOR UP TO 7 DAYS    NAPROXEN (NAPROSYN) 250 MG TABLET    TAKE ONE TABLET TWO TIMES A DAY WITH MEALS       Scribe Attestation:   [facility] acting as a scribe for and in the presence of Nelson Holly MD October 04, 2017 at 4:55 PM     Signed by: Jonelle López, October 04, 2017 at 4:55 PM     Provider Attestation:   I personally performed the services described in the documentation, reviewed the documentation, as recorded by the scribe in my presence, and it accurately and completely records my words and actions.      Reviewed and signed by:  Tony Sterling MD

## 2017-10-04 NOTE — ED NOTES
Purposeful rounding completed:    Side rails up x 2:  YES  Bed in low position and wheels locked: YES  Call bell within reach: YES  Comfort addressed: YES    Toileting needs addressed: YES  Plan of care reviewed/updated with patient and or family members: YES  IV site assessed: YES

## 2017-10-04 NOTE — DISCHARGE INSTRUCTIONS
Diarrhea: Care Instructions  Your Care Instructions    Diarrhea is loose, watery stools (bowel movements). The exact cause is often hard to find. Sometimes diarrhea is your body's way of getting rid of what caused an upset stomach. Viruses, food poisoning, and many medicines can cause diarrhea. Some people get diarrhea in response to emotional stress, anxiety, or certain foods. Almost everyone has diarrhea now and then. It usually isn't serious, and your stools will return to normal soon. The important thing to do is replace the fluids you have lost, so you can prevent dehydration. The doctor has checked you carefully, but problems can develop later. If you notice any problems or new symptoms, get medical treatment right away. Follow-up care is a key part of your treatment and safety. Be sure to make and go to all appointments, and call your doctor if you are having problems. It's also a good idea to know your test results and keep a list of the medicines you take. How can you care for yourself at home? · Watch for signs of dehydration, which means your body has lost too much water. Dehydration is a serious condition and should be treated right away. Signs of dehydration are:  ¨ Increasing thirst and dry eyes and mouth. ¨ Feeling faint or lightheaded. ¨ Darker urine, and a smaller amount of urine than normal.  · To prevent dehydration, drink plenty of fluids, enough so that your urine is light yellow or clear like water. Choose water and other caffeine-free clear liquids until you feel better. If you have kidney, heart, or liver disease and have to limit fluids, talk with your doctor before you increase the amount of fluids you drink. · Begin eating small amounts of mild foods the next day, if you feel like it. ¨ Try yogurt that has live cultures of Lactobacillus. (Check the label.)  ¨ Avoid spicy foods, fruits, alcohol, and caffeine until 48 hours after all symptoms are gone.   ¨ Avoid chewing gum that contains sorbitol. ¨ Avoid dairy products (except for yogurt with Lactobacillus) while you have diarrhea and for 3 days after symptoms are gone. · The doctor may recommend that you take over-the-counter medicine, such as loperamide (Imodium), if you still have diarrhea after 6 hours. Read and follow all instructions on the label. Do not use this medicine if you have bloody diarrhea, a high fever, or other signs of serious illness. Call your doctor if you think you are having a problem with your medicine. When should you call for help? Call 911 anytime you think you may need emergency care. For example, call if:  · You passed out (lost consciousness). · Your stools are maroon or very bloody. Call your doctor now or seek immediate medical care if:  · You are dizzy or lightheaded, or you feel like you may faint. · Your stools are black and look like tar, or they have streaks of blood. · You have new or worse belly pain. · You have symptoms of dehydration, such as:  ¨ Dry eyes and a dry mouth. ¨ Passing only a little dark urine. ¨ Feeling thirstier than usual.  · You have a new or higher fever. Watch closely for changes in your health, and be sure to contact your doctor if:  · Your diarrhea is getting worse. · You see pus in the diarrhea. · You are not getting better after 2 days (48 hours). Where can you learn more? Go to http://tommie-nicole.info/. Enter D485 in the search box to learn more about \"Diarrhea: Care Instructions. \"  Current as of: March 20, 2017  Content Version: 11.3  © 0638-9240 [employer]. Care instructions adapted under license by [employer] (which disclaims liability or warranty for this information). If you have questions about a medical condition or this instruction, always ask your healthcare professional. [address] any warranty or liability for your use of this information.

## 2017-10-09 ENCOUNTER — OFFICE VISIT (OUTPATIENT)
Dept: INTERNAL MEDICINE CLINIC | Age: 37
End: 2017-10-09

## 2017-10-09 VITALS
DIASTOLIC BLOOD PRESSURE: 60 MMHG | TEMPERATURE: 98.1 F | HEART RATE: 76 BPM | WEIGHT: 192 LBS | BODY MASS INDEX: 32.78 KG/M2 | HEIGHT: 64 IN | SYSTOLIC BLOOD PRESSURE: 110 MMHG | RESPIRATION RATE: 14 BRPM | OXYGEN SATURATION: 95 %

## 2017-10-09 DIAGNOSIS — Z91.14 NONCOMPLIANCE WITH MEDICATION REGIMEN: ICD-10-CM

## 2017-10-09 DIAGNOSIS — Z23 ENCOUNTER FOR IMMUNIZATION: ICD-10-CM

## 2017-10-09 DIAGNOSIS — R07.81 RIB PAIN ON LEFT SIDE: ICD-10-CM

## 2017-10-09 DIAGNOSIS — R19.7 DIARRHEA, UNSPECIFIED TYPE: Primary | ICD-10-CM
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
## 2017-10-09 NOTE — PROGRESS NOTES
Kyaw Grimes is a 42 y.o.  female and presents with    Chief Complaint   Patient presents with   [facility] Follow Up     Patient here for follow up. Patient stated she still in pain in rib area       Subjective:  HPI   Mrs. Jennifer Cunningham presents today for follow up to post [facility] ED visit on 10-4-17 for a fall into the outside trash can while she was reaching for something. Xray performed was negative for fracture. States Naprosyn not helping, has not tried Tramadol. Denies seizure activity, LOC, open wounds. Diarrhea- She has not taken the prescribed/recommended medication. States she always has intermittent bouts of diarrhea. Has used Imodium in the past without relief. History of IBS. Additional Concerns: none     ROS   Review of Systems   Constitutional: Negative. HENT: Negative. Eyes: Negative. Respiratory: Negative. Cardiovascular: Negative. Gastrointestinal: Positive for diarrhea. Genitourinary: Negative. Musculoskeletal: Positive for falls. Left anterior rib pain   Skin: Negative. Neurological: Negative. Psychiatric/Behavioral: Negative. Allergies   Allergen Reactions    Prozac [Fluoxetine] Unknown (comments) and Other (comments)       Current Outpatient Prescriptions   Medication Sig Dispense Refill    traMADol (ULTRAM) 50 mg tablet Take 1 Tab by mouth every six (6) hours as needed for Pain. Max Daily Amount: 200 mg. 12 Tab 0    loperamide (IMODIUM A-D) 2 mg tablet Take 1 Tab by mouth four (4) times daily as needed for Diarrhea for up to 10 days. 12 Tab 0    albuterol (PROVENTIL HFA, VENTOLIN HFA, PROAIR HFA) 90 mcg/actuation inhaler Take 2 Puffs by inhalation every four (4) hours as needed for Wheezing. Indications: BRONCHOSPASM PREVENTION 1 Inhaler 3    Omeprazole delayed release (PRILOSEC D/R) 20 mg tablet TAKE ONE CAPSULE BY MOUTH DAILY 30 Tab 11    QUEtiapine SR (SEROQUEL XR) 50 mg sr tablet Take 50 mg by mouth daily.       ziprasidone (GEODON) 20 mg capsule Take 20 mg by mouth daily. In AM      ziprasidone hcl (GEODON) 60 mg capsule Take 80 mg by mouth two (2) times daily (with meals).  sertraline (ZOLOFT) 100 mg tablet Take 100 mg by mouth daily.  albuterol (PROVENTIL HFA, VENTOLIN HFA, PROAIR HFA) 90 mcg/actuation inhaler Take 2 Puffs by inhalation every four (4) hours as needed for Wheezing. 1 Inhaler 2    beclomethasone (QVAR) 80 mcg/actuation inhaler INHALE 1 PUFF TWO TIMES A DAY 8.7 g 5    Biotin 2,500 mcg cap Take  by mouth daily.  QUEtiapine (SEROQUEL) 50 mg tablet Take 50 mg by mouth daily.  BUSPIRONE HCL (BUSPAR PO) Take 10 mg by mouth two (2) times a day.  clonazePAM (KLONOPIN) 1 mg tablet Take 1 mg by mouth daily as needed.  ketoconazole (NIZORAL) 2 % shampoo Apply 5 to 10 mL to wet scalp, lather, leave on 5 min, & rinse; apply once every 1 to 2 wks (prophylaxis) or twice weekly for 2 to 4 wks. 1 Bottle 11    valACYclovir (VALTREX) 500 mg tablet Take 500 mg by mouth daily.  LAMOTRIGINE (LAMICTAL PO) Take  by mouth daily.  inhalational spacing device 1 Each by Does Not Apply route as needed. 1 Device 1    lidocaine (XYLOCAINE) 4 % topical cream Apply  to affected area two (2) times daily as needed for Pain. 15 g 0       Social History     Social History    Marital status: SINGLE     Spouse name: N/A    Number of children: N/A    Years of education: N/A     Occupational History    Not on file.      Social History Main Topics    Smoking status: Current Every Day Smoker     Packs/day: 2.00     Years: 23.00     Types: Cigarettes     Start date: 2/9/1992    Smokeless tobacco: Never Used    Alcohol use 2.5 oz/week     5 Cans of beer per week      Comment: 2x week and on weekends: 1-6 drinks/sitting    Drug use: No    Sexual activity: Yes      Comment: condoms     Other Topics Concern    Not on file     Social History Narrative       Past Medical History:   Diagnosis Date    Asthma     Bipolar 1 disorder ([facility] [address]     Followed by [facility], depression and anxiety    Genital HSV     GERD (gastroesophageal reflux disease) 4/13/2017    IBS (irritable bowel syndrome)     Onychomycosis     was on lamisol for this 3 mos ago. Was on it for 6 mos.      Psychiatric disorder     Reactive airway disease     Tinea pedis        Past Surgical History:   Procedure Laterality Date    HX COLONOSCOPY      age 22 for diarrhea    HX CYST REMOVAL      hand    HX GYN      HX ORTHOPAEDIC      right hand    HX OVARIAN CYST REMOVAL      right ovary       Family History   Problem Relation Age of Onset   [facility] Arthritis-osteo Mother     Migraines Mother     Stroke Mother     Alcohol abuse Father     Cancer Father     Hypertension Father     Asthma Sister     Diabetes Sister     Heart Disease Sister     Stroke Maternal Grandmother     Diabetes Maternal Grandfather     COPD Maternal Grandfather     No Known Problems Paternal Grandmother     No Known Problems Paternal Grandfather        Objective:  Vitals:    10/09/17 1500   BP: 110/60   Pulse: 76   Resp: 14   Temp: 98.1 °F (36.7 °C)   TempSrc: Oral   SpO2: 95%   Weight: 192 lb (87.1 kg)   Height: 5' 4\" (1.626 m)   PainSc:   8   PainLoc: Rib Cage   LMP: 10/03/2017       LABS   Results for orders placed or performed during the hospital encounter of 03/31/17   INFLUENZA A & B AG (RAPID TEST)   Result Value Ref Range    Influenza A Antigen NEGATIVE  NEG      Influenza B Antigen NEGATIVE  NEG     URINALYSIS W/ RFLX MICROSCOPIC   Result Value Ref Range    Color YELLOW      Appearance CLEAR      Specific gravity 1.007 1.005 - 1.030      pH (UA) 6.0 5.0 - 8.0      Protein NEGATIVE  NEG mg/dL    Glucose NEGATIVE  NEG mg/dL    Ketone NEGATIVE  NEG mg/dL    Bilirubin NEGATIVE  NEG      Blood NEGATIVE  NEG      Urobilinogen 0.2 0.2 - 1.0 EU/dL    Nitrites NEGATIVE  NEG      Leukocyte Esterase NEGATIVE  NEG     HCG URINE, QL   Result Value Ref Range    HCG urine, Ql. NEGATIVE  NEG         TESTS  XR Results (most recent):    Results from Hospital Encounter encounter on 10/04/17   XR RIBS LT W PA CXR MIN 3 V   Narrative EXAM:  XR RIBS LT W PA CXR MIN 3 V    INDICATION:   Recent fall with left-sided rib pain. Cough. COMPARISON: Chest x-ray October 2015    FINDINGS: A frontal radiograph of the chest and 3 oblique views of the left ribs  demonstrate no fracture. There is no pneumothorax or pleural effusion. Impression IMPRESSION:  No rib fracture identified. PE  Physical Exam   Constitutional: She is oriented to person, place, and time. She appears well-developed and well-nourished. Cardiovascular: Normal rate and regular rhythm. Pulmonary/Chest: Effort normal and breath sounds normal.   Musculoskeletal: Normal range of motion. Neurological: She is alert and oriented to person, place, and time. Skin: Skin is warm and dry. Psychiatric: She has a normal mood and affect. Her behavior is normal. Judgment and thought content normal.       Assessment/Plan:    1. Contusion to the left ribs-  Fall into outside trash can. Denies seizure activity, LOC, confusion. The patient has not tried Tramadol for pain as of yet. Recommended to try. Ok to use Lidocaine cream to site of pain as well, states has at home. Patient asking for narcotics, discussed that narcotics are not appropriate for this type of pain as this pain is inflammatory. Splinting instructions provided. Cool compresses to site. Patient verbalized understanding of plan of care. 2. Diarrhea- Continues to have complaints of intermittent bouts of diarrhea, history of IBS. Continue clear liquid and bland diet. 3. Noncompliance with medication regimen- Patient has been out of a majority of her medications for x time (inhalers, Lamictal, Geodon, Adderral-states wants to wean off Adderral as wants \"to see if she still needs it\").  She has refills and they are ready for  at the pharmacy. The patient states she will  today. Recommended to follow up with prescribing providers regarding stopping and starting medications. She is followed by [facility] and Dr. Owen Cisneros. Lab review: no lab studies available for review at time of visit    Today's Visit:   Diagnoses and all orders for this visit:    1. Diarrhea, unspecified type    2. Fall against object    3. Rib pain on left side    4. Noncompliance with medication regimen    5. Encounter for immunization  -     Influenza virus vaccine (QUADRIVALENT PRES FREE SYRINGE) IM (98009)  -     ADMIN PNEUMOCOCCAL VACCINE  Medicare Injection Admin Charge    Health Maintenance:   Influenza vaccine today. Tdap HM updated      I have discussed the diagnosis with the patient and the intended plan as seen in the above orders. The patient has received an after-visit summary and questions were answered concerning future plans. I have discussed medication side effects and warnings with the patient as well. I have reviewed the plan of care with the patient, accepted their input and they are in agreement with the treatment goals. Follow-up Disposition:  Return if symptoms worsen or fail to improve. More than 1/2 of this 25 minute visit was spent in counseling and coordination of care, as described above.     GALE Mccracken  Internist of [address], [address] [place] [address].  Phone: [phone]  Fax: [phone]

## 2017-10-09 NOTE — PROGRESS NOTES
1. Have you been to the ER, urgent care clinic or hospitalized since your last visit? YES.     2. Have you seen or consulted any other health care providers outside of the [address] [place] [address] since your last visit (Include any pap smears or colon screening)? NO      Do you have an Advanced Directive? NO    Would you like information on Advanced Directives?  NO

## 2017-11-13 ENCOUNTER — TELEPHONE (OUTPATIENT)
Dept: INTERNAL MEDICINE CLINIC | Age: 37
End: 2017-11-13

## 2017-11-13 DIAGNOSIS — R05.9 COUGH: ICD-10-CM

## 2017-11-13 RX ORDER — BENZONATATE 100 MG/1
CAPSULE ORAL
Qty: 21 CAP | Refills: 2 | Status: SHIPPED | OUTPATIENT
Start: 2017-11-13 | End: 2018-06-08 | Stop reason: ALTCHOICE

## 2017-11-13 NOTE — TELEPHONE ENCOUNTER
Please inform Mrs. Ginna Montejo that I can refill the Tessalon however if she is having increased shortness of breath, wheezing, using her inhaler frequently, fevers, chills, chest tightness/pressure, night time asthma symptoms then I would highly recommend that she be evaluated as she could be having an asthma/COPD exacerbation.

## 2017-11-13 NOTE — TELEPHONE ENCOUNTER
Patient called stating she has a cold. She is requesting this because she has asthma and bronchial issues and this is what she usually takes for it. Stating we will normally refill it for her without having her come in for an appt.

## 2018-01-08 DIAGNOSIS — J45.30 MILD PERSISTENT ASTHMA WITHOUT COMPLICATION: ICD-10-CM

## 2018-01-08 DIAGNOSIS — R05.9 COUGH: ICD-10-CM

## 2018-01-09 RX ORDER — ALBUTEROL SULFATE 90 UG/1
2 AEROSOL, METERED RESPIRATORY (INHALATION)
Qty: 1 INHALER | Refills: 11 | Status: SHIPPED | OUTPATIENT
Start: 2018-01-09 | End: 2018-10-03 | Stop reason: SDUPTHER

## 2018-01-09 RX ORDER — ALBUTEROL SULFATE 108 UG/1
AEROSOL, METERED RESPIRATORY (INHALATION)
Refills: 2 | OUTPATIENT
Start: 2018-01-09

## 2018-01-31 ENCOUNTER — OFFICE VISIT (OUTPATIENT)
Dept: INTERNAL MEDICINE CLINIC | Age: 38
End: 2018-01-31

## 2018-01-31 ENCOUNTER — TELEPHONE (OUTPATIENT)
Dept: INTERNAL MEDICINE CLINIC | Age: 38
End: 2018-01-31

## 2018-01-31 ENCOUNTER — HOSPITAL ENCOUNTER (OUTPATIENT)
Dept: LAB | Age: 38
Discharge: HOME | End: 2018-01-31
Payer: MEDICAID

## 2018-01-31 VITALS
TEMPERATURE: 98.7 F | HEART RATE: 85 BPM | WEIGHT: 200 LBS | RESPIRATION RATE: 14 BRPM | DIASTOLIC BLOOD PRESSURE: 68 MMHG | BODY MASS INDEX: 34.15 KG/M2 | SYSTOLIC BLOOD PRESSURE: 120 MMHG | OXYGEN SATURATION: 95 % | HEIGHT: 64 IN

## 2018-01-31 DIAGNOSIS — E66.9 CLASS 1 OBESITY WITHOUT SERIOUS COMORBIDITY WITH BODY MASS INDEX (BMI) OF 34.0 TO 34.9 IN ADULT, UNSPECIFIED OBESITY TYPE: ICD-10-CM

## 2018-01-31 DIAGNOSIS — Z76.89 ESTABLISHING CARE WITH NEW DOCTOR, ENCOUNTER FOR: ICD-10-CM

## 2018-01-31 DIAGNOSIS — K21.9 GASTROESOPHAGEAL REFLUX DISEASE WITHOUT ESOPHAGITIS: ICD-10-CM

## 2018-01-31 DIAGNOSIS — R10.31 RLQ ABDOMINAL PAIN: Primary | ICD-10-CM

## 2018-01-31 DIAGNOSIS — Z86.39 HISTORY OF VITAMIN D DEFICIENCY: ICD-10-CM

## 2018-01-31 DIAGNOSIS — R10.31 RLQ ABDOMINAL PAIN: ICD-10-CM

## 2018-01-31 DIAGNOSIS — R19.7 DIARRHEA, UNSPECIFIED TYPE: ICD-10-CM

## 2018-01-31 LAB
APPEARANCE UR: CLEAR
BACTERIA URNS QL MICRO: NEGATIVE /HPF
BILIRUB UR QL: NEGATIVE
COLOR UR: YELLOW
EPITH CASTS URNS QL MICRO: NORMAL /LPF (ref 0–5)
GLUCOSE UR STRIP.AUTO-MCNC: NEGATIVE MG/DL
HGB UR QL STRIP: NEGATIVE
KETONES UR QL STRIP.AUTO: NEGATIVE MG/DL
LEUKOCYTE ESTERASE UR QL STRIP.AUTO: NEGATIVE
NITRITE UR QL STRIP.AUTO: NEGATIVE
PH UR STRIP: 6 [PH] (ref 5–8)
PROT UR STRIP-MCNC: NEGATIVE MG/DL
RBC #/AREA URNS HPF: 0 /HPF (ref 0–5)
SP GR UR REFRACTOMETRY: 1.01 (ref 1–1.03)
UROBILINOGEN UR QL STRIP.AUTO: 0.2 EU/DL (ref 0.2–1)
WBC URNS QL MICRO: NORMAL /HPF (ref 0–4)

## 2018-01-31 PROCEDURE — 81001 URINALYSIS AUTO W/SCOPE: CPT | Performed by: NURSE PRACTITIONER

## 2018-01-31 RX ORDER — PHENOL/SODIUM PHENOLATE
AEROSOL, SPRAY (ML) MUCOUS MEMBRANE
Qty: 30 TAB | Refills: 11 | Status: SHIPPED | OUTPATIENT
Start: 2018-01-31 | End: 2018-06-08 | Stop reason: ALTCHOICE

## 2018-01-31 RX ORDER — DEXTROAMPHETAMINE SACCHARATE, AMPHETAMINE ASPARTATE, DEXTROAMPHETAMINE SULFATE AND AMPHETAMINE SULFATE 5; 5; 5; 5 MG/1; MG/1; MG/1; MG/1
20 TABLET ORAL 3 TIMES DAILY
COMMUNITY
End: 2019-08-27

## 2018-01-31 RX ORDER — VALACYCLOVIR HYDROCHLORIDE 500 MG/1
500 TABLET, FILM COATED ORAL DAILY
Qty: 6 TAB | Refills: 0 | Status: SHIPPED | OUTPATIENT
Start: 2018-01-31 | End: 2018-02-14 | Stop reason: SDUPTHER

## 2018-01-31 NOTE — PROGRESS NOTES
1. Have you been to the ER, urgent care clinic or hospitalized since your last visit? NO.     2. Have you seen or consulted any other health care providers outside of the [address] [place] [address] since your last visit (Include any pap smears or colon screening)? NO      Do you have an Advanced Directive? NO    Would you like information on Advanced Directives?  NO

## 2018-01-31 NOTE — PROGRESS NOTES
Ventura Douglass is a 34 y.o.  female and presents with    Chief Complaint   Patient presents with    Diarrhea     Patient here for diarrhea and abdominal pain on right side. Patient stated that she always has diarrhea but over the last 2 days it has been worse. Subjective:  HPI    Ms. Forest Piña presents today with complaints of abdominal pain and diarrhea x2 days. She has a history of IBS, GERD, bipolar personality, and anxiety. She reports the pain is to the right lower quadrant and felt during a bowel movement, pain relieved after bowel movement. She denies pain with positional change or on urination, denies fever, chills, nausea, vomiting, anorexia, hematuria and hematochezia. The diarrhea has been relieved with Imodium. She has a history of IBS with diarrhea however reports today that the diarrhea has been increased the last 2 days and the pain is changed from the usual.  She is unable to recall how many bouts of diarrhea that has occurred over the last 2 days however reports she was going every couple of hours. She did travel out of town and just returned by plane yesterday, positive sick contacts, mom and dad with flu-however she has has limited exposure due to being out of town. She denies possibility of foodborne illness. She denies possibility of pregnancy, last menstrual period was last month however she reports no sexual intercourse ×2 years. She has been out of her Prilosec and reports GERD symptoms. Additional Concerns: none     ROS   Review of Systems   Constitutional: Negative for chills, diaphoresis, fever and malaise/fatigue. HENT: Negative. Eyes: Negative. Respiratory: Negative. Cardiovascular: Negative for chest pain, palpitations, orthopnea and leg swelling. Gastrointestinal: Positive for abdominal pain, diarrhea and heartburn. Negative for blood in stool, constipation, melena, nausea and vomiting. Genitourinary: Negative. Musculoskeletal: Negative. Skin: Negative for itching and rash. Neurological: Negative for dizziness, weakness and headaches. Allergies   Allergen Reactions    Prozac [Fluoxetine] Unknown (comments) and Other (comments)       Current Outpatient Prescriptions   Medication Sig Dispense Refill    dextroamphetamine-amphetamine (ADDERALL) 20 mg tablet Take 20 mg by mouth.  Omeprazole delayed release (PRILOSEC D/R) 20 mg tablet TAKE ONE CAPSULE BY MOUTH DAILY 30 Tab 11    valACYclovir (VALTREX) 500 mg tablet Take 1 Tab by mouth daily. 6 Tab 0    albuterol (PROVENTIL HFA, VENTOLIN HFA, PROAIR HFA) 90 mcg/actuation inhaler Take 2 Puffs by inhalation every four (4) hours as needed for Wheezing. 1 Inhaler 11    QVAR 80 mcg/actuation aero INHALE 1 PUFF TWO TIMES A DAY 1 Inhaler 2    benzonatate (TESSALON) 100 mg capsule TAKE ONE CAPSULE 3 TIMES A DAY AS NEEDED FOR COUGH 21 Cap 2    albuterol (PROVENTIL HFA, VENTOLIN HFA, PROAIR HFA) 90 mcg/actuation inhaler Take 2 Puffs by inhalation every four (4) hours as needed for Wheezing. Indications: BRONCHOSPASM PREVENTION 1 Inhaler 3    QUEtiapine SR (SEROQUEL XR) 50 mg sr tablet Take 50 mg by mouth daily.  ziprasidone (GEODON) 20 mg capsule Take 20 mg by mouth daily. In AM      ziprasidone hcl (GEODON) 60 mg capsule Take 80 mg by mouth two (2) times daily (with meals).  sertraline (ZOLOFT) 100 mg tablet Take 100 mg by mouth daily.  LAMOTRIGINE (LAMICTAL PO) Take  by mouth daily.  Biotin 2,500 mcg cap Take  by mouth daily.  QUEtiapine (SEROQUEL) 50 mg tablet Take 50 mg by mouth daily.  BUSPIRONE HCL (BUSPAR PO) Take 10 mg by mouth two (2) times a day.  clonazePAM (KLONOPIN) 1 mg tablet Take 1 mg by mouth daily as needed.  ketoconazole (NIZORAL) 2 % shampoo Apply 5 to 10 mL to wet scalp, lather, leave on 5 min, & rinse; apply once every 1 to 2 wks (prophylaxis) or twice weekly for 2 to 4 wks.  1 Bottle 11    inhalational spacing device 1 Each by Does Not Apply route as needed. 1 Device 1       Social History     Social History    Marital status: SINGLE     Spouse name: N/A    Number of children: N/A    Years of education: N/A     Occupational History    Not on file. Social History Main Topics    Smoking status: Current Every Day Smoker     Packs/day: 2.00     Years: 23.00     Types: Cigarettes     Start date: 2/9/1992    Smokeless tobacco: Never Used    Alcohol use 2.5 oz/week     5 Cans of beer per week      Comment: 2x week and on weekends: 1-6 drinks/sitting    Drug use: No    Sexual activity: Yes      Comment: condoms     Other Topics Concern    Not on file     Social History Narrative       Past Medical History:   Diagnosis Date    Asthma     Bipolar 1 disorder ([facility] [address]     Followed by [facility], depression and anxiety    Genital HSV     GERD (gastroesophageal reflux disease) 4/13/2017    IBS (irritable bowel syndrome)     Onychomycosis     was on lamisol for this 3 mos ago. Was on it for 6 mos.      Psychiatric disorder     Reactive airway disease     Tinea pedis        Past Surgical History:   Procedure Laterality Date    HX COLONOSCOPY      age 22 for diarrhea    HX CYST REMOVAL      hand    HX GYN      HX ORTHOPAEDIC      right hand    HX OVARIAN CYST REMOVAL      right ovary       Family History   Problem Relation Age of Onset   [address] [facility] Arthritis-osteo Mother    [address] [facility] Migraines Mother     Stroke Mother     Alcohol abuse Father     Cancer Father     Hypertension Father     Asthma Sister     Diabetes Sister     Heart Disease Sister     Stroke Maternal Grandmother     Diabetes Maternal Grandfather     COPD Maternal Grandfather     No Known Problems Paternal Grandmother     No Known Problems Paternal Grandfather        Objective:  Vitals:    01/31/18 1254   BP: 120/68   Pulse: 85   Resp: 14   Temp: 98.7 °F (37.1 °C)   TempSrc: Oral   SpO2: 95%   Weight: 200 lb (90.7 kg)   Height: 5' 4\" (1.626 m) PainSc:   6   PainLoc: Abdomen   LMP: 12/23/2017       LABS   None    TESTS  none    PE  Physical Exam   Constitutional: She is oriented to person, place, and time. She appears well-developed and well-nourished. No distress. HENT:   Head: Normocephalic and atraumatic. Eyes: Conjunctivae and EOM are normal. No scleral icterus. Neck: Normal range of motion. Neck supple. Cardiovascular: Normal rate, regular rhythm, normal heart sounds and intact distal pulses. Pulmonary/Chest: Effort normal and breath sounds normal. No respiratory distress. She has no wheezes. She has no rales. She exhibits no tenderness. Abdominal: Soft. Normal appearance. She exhibits no shifting dullness, no distension, no fluid wave, no abdominal bruit, no ascites and no mass. Bowel sounds are decreased. There is no hepatosplenomegaly. There is tenderness in the right upper quadrant and right lower quadrant. There is positive Siddiqui's sign. There is no rebound, no guarding and no CVA tenderness. No hernia. Musculoskeletal: Normal range of motion. Lymphadenopathy:     She has no cervical adenopathy. Neurological: She is alert and oriented to person, place, and time. Skin: Skin is warm and dry. She is not diaphoretic. Psychiatric: She has a normal mood and affect. Her behavior is normal. Judgment and thought content normal.   Vitals reviewed. Assessment/Plan:    1. RLQ/RUQ pain, diarrhea- Gallbladder ultrasound ordered, RUQ pain, positive Siddiqui's sign. Will order labs and have patient complete at [facility] when fasting, urine sent today. Referral to GI ordered, hx IBS. 2. Establish care- Labs ordered today. Follow up in one month for CPE. 3. GERD- Controlled on Prilosec, refilled today. 4. Vitamin d deficiency- Labs today. 5. Obesity- will discuss at next visit. 6. COPD with asthma- on Qvar 1 puff BID and albuterol. Followed by Dr. Yuan Qureshi. Continues to smoke. 7. ADHD/Bipolar- followed by Psychiatry.     Lab review: no lab studies available for review at time of visit    Today's Visit:   Diagnoses and all orders for this visit:    1. RLQ abdominal pain  -     US GALLBLADDER; Future  -     [facility]  -     URINALYSIS W/MICROSCOPIC; Future  -     CBC WITH AUTOMATED DIFF; Future  -     METABOLIC PANEL, COMPREHENSIVE; Future  -     TSH 3RD GENERATION; Future    2. Diarrhea, unspecified type  -     US GALLBLADDER; Future  -     [facility]  -     TSH 3RD GENERATION; Future    3. Establishing care with new doctor, encounter for  -     CBC WITH AUTOMATED DIFF; Future  -     LIPID PANEL; Future  -     METABOLIC PANEL, COMPREHENSIVE; Future  -     TSH 3RD GENERATION; Future  -     VITAMIN D, 25 HYDROXY; Future  -     HEMOGLOBIN A1C WITH EAG; Future    4. Class 1 obesity without serious comorbidity with body mass index (BMI) of 34.0 to 34.9 in adult, unspecified obesity type  -     LIPID PANEL; Future  -     TSH 3RD GENERATION; Future  -     HEMOGLOBIN A1C WITH EAG; Future    5. History of vitamin D deficiency  -     VITAMIN D, 25 HYDROXY; Future    6. Gastroesophageal reflux disease without esophagitis  -     Omeprazole delayed release (PRILOSEC D/R) 20 mg tablet; TAKE ONE CAPSULE BY MOUTH DAILY    Other orders  -     valACYclovir (VALTREX) 500 mg tablet; Take 1 Tab by mouth daily. Health Maintenance: up to date     I have discussed the diagnosis with the patient and the intended plan as seen in the above orders. The patient has received an after-visit summary and questions were answered concerning future plans. I have discussed medication side effects and warnings with the patient as well. I have reviewed the plan of care with the patient, accepted their input and they are in agreement with the treatment goals. Follow-up Disposition: Not on File   More than 1/2 of this 30 minute visit was spent in counseling and coordination of care, as described above.     GALE Ngo  Internist of [facility] [address] [facility], [facility] [address]  Phone: [phone]  Fax: [phone]

## 2018-01-31 NOTE — TELEPHONE ENCOUNTER
Can you please contact the patient and let her know that I ordered labs also,for her to be completed when she is fasting, to be done at [facility]. And if ok with her new work schedule have her follow up with me in one month after GI consult, for a CPE.

## 2018-02-01 ENCOUNTER — TELEPHONE (OUTPATIENT)
Dept: INTERNAL MEDICINE CLINIC | Age: 38
End: 2018-02-01

## 2018-02-01 NOTE — TELEPHONE ENCOUNTER
Pt. Calling asking if she can have a pain med for the pain in her abdomen. Patient also wanted to know if she could have some Theraflu pills.     Pls Advise

## 2018-02-01 NOTE — TELEPHONE ENCOUNTER
Patient given this information. She did not want to schedule says she isn't sure of her work schedule as of yet. She will call back.

## 2018-02-02 NOTE — TELEPHONE ENCOUNTER
Left message on her voicemail to let her know that she can take tylenol or naprosyn for her pain and theraflu is OTC (since its Friday and its late in the day), it looks like the  has tried to call her and didn't get in touch with her but they left her a message to schedule the ultrasound.

## 2018-02-02 NOTE — TELEPHONE ENCOUNTER
I recommend tylenol or naprosyn at this time for the pain level and symptoms that I saw here for however if the pain and symptoms have worsened please have her present to ED. . Did she schedule the ultrasound and has she made an appointment with GI? Does she want theraflu for cold symptoms? This is OTC. Other recommendations to try. .. Symptomatic Treatment:     Make sure you are staying hydrated, 6-8 glasses of water daily, warm teas with honey to soothe a sore throat, throat lozenges, cool mist humidifier    Over the counter medications:     Nasal saline rinses followed by Flonase or Nasacort 1 puff to each side of the nose to be done daily prior to bedtime to help decreased nasal congestion and post nasal drip. Mucinex daily to help loosen chest and nasal congestion and needs to be taken daily with good hydration to provide relief. Antihistamines (Claritin, Zyrtec, Allegra) help dry up congestion and decrease watery or itchy eyes, ear fullness if related to noninfectious fluid behind the ear drum, and itchy ears. Sudafed- helps with congestion and cough. This medication does contain phenylephrine so be cautious while taking this medication if you have a diagnosis of elevated blood pressure. Tylenol, Naproxsyn, Aleve can be used to help relieve pain/tenderness/headaches/fever    Prescription medications:    Tessalon tablets take to help reduce cough.

## 2018-02-14 RX ORDER — VALACYCLOVIR HYDROCHLORIDE 500 MG/1
TABLET, FILM COATED ORAL
Qty: 6 TAB | Refills: 0 | Status: SHIPPED | OUTPATIENT
Start: 2018-02-14 | End: 2018-06-08 | Stop reason: ALTCHOICE

## 2018-06-08 ENCOUNTER — OFFICE VISIT (OUTPATIENT)
Dept: INTERNAL MEDICINE CLINIC | Age: 38
End: 2018-06-08

## 2018-06-08 VITALS
HEIGHT: 64 IN | OXYGEN SATURATION: 97 % | DIASTOLIC BLOOD PRESSURE: 57 MMHG | SYSTOLIC BLOOD PRESSURE: 122 MMHG | TEMPERATURE: 96.9 F | RESPIRATION RATE: 12 BRPM | WEIGHT: 198.4 LBS | BODY MASS INDEX: 33.87 KG/M2 | HEART RATE: 83 BPM

## 2018-06-08 DIAGNOSIS — R51.9 NONINTRACTABLE HEADACHE, UNSPECIFIED CHRONICITY PATTERN, UNSPECIFIED HEADACHE TYPE: Primary | ICD-10-CM

## 2018-06-08 DIAGNOSIS — F31.9 BIPOLAR DISORDER WITHOUT PSYCHOTIC FEATURES (HCC): ICD-10-CM

## 2018-06-08 DIAGNOSIS — R19.7 DIARRHEA, UNSPECIFIED TYPE: ICD-10-CM

## 2018-06-08 DIAGNOSIS — Z79.899 POLYPHARMACY: ICD-10-CM

## 2018-06-08 PROBLEM — K90.0 CELIAC DISEASE: Status: ACTIVE | Noted: 2018-06-08

## 2018-06-08 RX ORDER — QUETIAPINE FUMARATE 50 MG/1
50 TABLET, EXTENDED RELEASE ORAL
COMMUNITY
End: 2018-06-08 | Stop reason: SDUPTHER

## 2018-06-08 RX ORDER — LAMOTRIGINE 100 MG/1
100 TABLET ORAL
COMMUNITY

## 2018-06-08 RX ORDER — CLONAZEPAM 1 MG/1
1 TABLET ORAL
COMMUNITY

## 2018-06-08 RX ORDER — ZIPRASIDONE HYDROCHLORIDE 20 MG/1
20 CAPSULE ORAL 2 TIMES DAILY
COMMUNITY
Start: 2018-05-10

## 2018-06-08 NOTE — PATIENT INSTRUCTIONS
Health Maintenance Due   Topic Date Due    PAP AKA CERVICAL CYTOLOGY  07/22/2018          Gluten-Free Diet: Care Instructions  Your Care Instructions    To help your symptoms, your doctor has recommended a gluten-free diet. This means not eating foods that have gluten in them. Gluten is a kind of protein. It's found in wheat, barley, and rye. If you eat a gluten-free diet, you can help manage your symptoms and prevent long-term problems. You can also get all the nutrition you need. Follow-up care is a key part of your treatment and safety. Be sure to make and go to all appointments, and call your doctor if you are having problems. It's also a good idea to know your test results and keep a list of the medicines you take. How can you care for yourself at home? · Don't eat any foods that have gluten in them. These include bagels, bread, crackers, and some cereals. They also include pasta and pizza. · Carefully read food labels. Look for wheat or wheat products in ice cream and candy. You may also find them in salad dressing, canned and frozen soups and vegetables, and other processed foods. · Avoid all beer products unless the label says they are gluten-free. Beers with and without alcohol have gluten unless the labels say they are gluten-free. This includes lagers, ales, and stouts. · Avoid oats, at least at first. Oats may cause symptoms in some people, perhaps as a result of contamination with wheat, barley, or rye during processing. But many people who have celiac disease can eat moderate amounts of oats without having symptoms. Health professionals vary in their long-term recommendations regarding eating foods with oats. But most agree it is safe to eat oats labeled as gluten-free. · When you eat out, look for restaurants that serve gluten-free food. You can also ask if the  is familiar with gluten-free cooking. · Try to learn more about gluten-free options.  Find grocery stores that sell gluten-free pizza and other foods. If you have access to the Internet, look online for gluten-free foods and recipes. · On a gluten-free eating plan, it's okay to have:  ¨ Eggs and dairy products. (But some dairy products may make your symptoms worse. Ask your doctor if you have questions about dairy products. Read ingredient labels carefully. Some processed cheeses contain gluten.)  ¨ Flours and foods made with amaranth, arrowroot, beans, buckwheat, corn, cornmeal, flax, millet, potatoes, gluten-free nut and oat bran, quinoa, rice, sorghum, soybeans, tapioca, or teff. ¨ Fresh, frozen, or canned unprocessed meats. But avoid processed meats. Some examples of processed meats to avoid are hot dogs, salami, and deli meat. Read labels for additives that may contain gluten. ¨ Fresh, frozen, dried, or canned fruits and vegetables, if they do not have thickeners or other additives that contain gluten. ¨ Some alcohol drinks. These include wine, liqueurs, and ciders. They also include liquor like whiskey and ewa. When should you call for help? Watch closely for changes in your health, and be sure to contact your doctor if:  ? · You have unexplained weight loss. ? · You have diarrhea that lasts longer than 1 to 2 weeks. ? · You have unusual fatigue or mood changes, especially if these last more than a week and are not related to any other illness, such as the flu. ? · Your symptoms come back again. ? · Your stomach pain gets worse. Where can you learn more? Go to http://tommie-nicole.info/. Enter 31 41 19 in the search box to learn more about \"Gluten-Free Diet: Care Instructions. \"  Current as of: May 12, 2017  Content Version: 11.4  © 5491-9956 [employer]. Care instructions adapted under license by [employer] (which disclaims liability or warranty for this information).  If you have questions about a medical condition or this instruction, always ask your healthcare professional. [employer] disclaims any warranty or liability for your use of this information.

## 2018-06-08 NOTE — LETTER
NOTIFICATION OF RETURN TO WORK / SCHOOL 
 
6/8/2018 Ms. Jojo Martinez [address] [address] To Whom It May Concern: 
 
Jojo Martinez is my patient and is under my medical care. Please excuse her absence from work on 6/8/18. She can return to work after her apt on 6/8/18 with regular duties and/or activities . If there are questions or concerns please have the patient contact our office.  
 
 
 
Sincerely, 
 
 
 
 
 
 
Negrita Castorena MD

## 2018-06-08 NOTE — PROGRESS NOTES
Chief Complaint   Patient presents with    Headache     on 5-22-18 patient went to  [facility] for Headaches    Anxiety     is getting worse due to the patient adjusting her medication per her Psychiatrist directions and has been unable to go to work     1. Have you been to the ER, urgent care clinic since your last visit? Hospitalized since your last visit? Yes When: 5-22-18 Where: [facility] Reason: severe headache felt like a migraine headache for 3 days. 2. Have you seen or consulted any other health care providers outside of the [address] [place] [address] since your last visit? Include any pap smears or colon screening.  No

## 2018-06-08 NOTE — PROGRESS NOTES
INTERNISTS [facility] [place]:  6/8/2018, MRN: [id number]      Mitchell Mayo is a 34 y.o. female and presents to clinic for Headache (on 5-22-18 patient went to  [facility] ER for Headaches) and Anxiety (is getting worse due to the patient adjusting her medication per her Psychiatrist directions and has been unable to go to work)    Subjective: The pt is a 42yo female with COPD, ADHD, vitamin D deficiency, GERD, diverticulosis, ?celiac disease (positive IgG 9/13/16), bipolar disorder (followed by Psychiatry), bone spurs (feet), nicotine dependence, and herpes. Chronic Abdominal Pain/Diarrhea, HAs, Bipolar Disorder, and Polypharmacy:   The patient presents to clinic for multiple issues. She has chronic diarrhea and abdominal discomfort, followed by the gastroenterology team.  She is scheduled for colonoscopy on June 27, 2018. She denies hematochezia, melena, and hematuria. No dysuria. She is not adhering to a gluten-free diet. She states that all she can eat/tolerate his breath. No spicy foods. Note that she had a positive celiac IgG level in 2016. She was in the emergency department last month for worsening headaches. Initially at the time of her visit, she stated that her headache pain was 2 out of 10. By the end of her appointment, her headache pain resolved. She reports having had headaches off and on since April of this year. When she gets headaches, they can last up to 3 hours at a time. No new paresthesias, vision changes, hearing changes, or weakness. She also mentioned that her headaches are mostly brought on by stress. She admits that psychologically, she has had worsening crying/anxiety spells since April. She is followed closely by the psychiatry team given history of bipolar disorder (which she has had for yrs). She is on multiple medications. Her medication list that we have for her in the EHR is not consistent with what she is actually taking.   She states that she is weaning herself off of some of her medications (but could not clearly explain how and what medications she is weaning off of and/or weaning on. She couldn't explain the instructions given to her at her last Psychiatry apt. She is on multiple antipsychotic medications, taking both Seroquel and Geodon per her hx. She also states that she is supposed to be transitioning to Effexor but we do not have this on her medication list.  She used to take Zoloft per her hx. During the time of her visit, she would admit to taking one medication and then later, state that she is not taking it. She admits to taking benzodiazepine rx (but didn't tell me which one), Adderall, Buspar, Geodon, Seroquel, and Buspar off/on. Meanwhile,her stress is from [place], work, and my job. \"  She is presently looking for a new job. She does not get along well with her work colleagues at present. At times, she will have anger spells at work. Her psychiatry team recommended counseling services but she has yet to schedule an appointment for this. She states that symptoms of stress have been so severe, that she did not go to work for 3 days and is asking for a work note. No feelings of hopelessness and suicide. Patient Active Problem List    Diagnosis Date Noted    Celiac disease 06/08/2018    COPD with asthma ([facility] [address] 07/14/2017    Family history of breast cancer 06/12/2017    At high risk for breast cancer 06/12/2017    ADHD (attention deficit hyperactivity disorder) 04/13/2017    GERD (gastroesophageal reflux disease) 04/13/2017    Vitamin D deficiency 04/13/2017    Diverticulosis seen on CT 04/13/2017    Bipolar disorder without psychotic features  04/13/2017    Bone spurs on feet 04/13/2017    Tobacco dependence 06/09/2015    Herpes simplex 02/11/2012       Current Outpatient Prescriptions   Medication Sig Dispense Refill    ziprasidone (GEODON) 20 mg capsule 20 mg.      lamoTRIgine (LAMICTAL) 100 mg tablet 200 mg.  clonazePAM (KLONOPIN) 0.5 mg tablet Take 0.5 mg by mouth three (3) times daily as needed.  dextroamphetamine-amphetamine (ADDERALL) 20 mg tablet Take 20 mg by mouth three (3) times daily.  albuterol (PROVENTIL HFA, VENTOLIN HFA, PROAIR HFA) 90 mcg/actuation inhaler Take 2 Puffs by inhalation every four (4) hours as needed for Wheezing. 1 Inhaler 11    albuterol (PROVENTIL HFA, VENTOLIN HFA, PROAIR HFA) 90 mcg/actuation inhaler Take 2 Puffs by inhalation every four (4) hours as needed for Wheezing. Indications: BRONCHOSPASM PREVENTION 1 Inhaler 3    QUEtiapine SR (SEROQUEL XR) 50 mg sr tablet Take 50 mg by mouth daily.  Biotin 2,500 mcg cap Take  by mouth daily.  inhalational spacing device 1 Each by Does Not Apply route as needed. 1 Device 1    QUEtiapine (SEROQUEL) 50 mg tablet Take 50 mg by mouth daily.  BUSPIRONE HCL (BUSPAR PO) Take 10 mg by mouth two (2) times a day.  QVAR 80 mcg/actuation aero INHALE 1 PUFF TWO TIMES A DAY 1 Inhaler 2       Allergies   Allergen Reactions    Prozac [Fluoxetine] Unknown (comments) and Other (comments)       Past Medical History:   Diagnosis Date    Asthma     Bipolar 1 disorder ([facility] [address]     [place] [facility] [place] [facility] and anxiety    Genital HSV     GERD (gastroesophageal reflux disease) 4/13/2017    IBS (irritable bowel syndrome)     Onychomycosis     was on lamisol for this 3 mos ago. Was on it for 6 mos.      Psychiatric disorder     Reactive airway disease     Tinea pedis        Past Surgical History:   Procedure Laterality Date    HX COLONOSCOPY      age 22 for diarrhea    HX CYST REMOVAL      hand    HX GYN      HX ORTHOPAEDIC      right hand    HX OVARIAN CYST REMOVAL      right ovary       Family History   Problem Relation Age of Onset   [place]  Arthritis-osteo Mother    [place]  Migraines Mother     Stroke Mother     Alcohol abuse Father     Cancer Father     Hypertension Father     Asthma Sister     Diabetes Sister     Heart Disease Sister     Stroke Maternal Grandmother     Diabetes Maternal Grandfather     COPD Maternal Grandfather     No Known Problems Paternal Grandmother     No Known Problems Paternal Grandfather        Social History   Substance Use Topics    Smoking status: Current Every Day Smoker     Packs/day: 2.00     Years: 23.00     Types: Cigarettes     Start date: 2/9/1992    Smokeless tobacco: Never Used    Alcohol use 1.2 oz/week     2 Glasses of wine per week      Comment: 2x week and on weekends: 1-6 drinks/sitting       ROS   Review of Systems   Constitutional: Negative for chills and fever. HENT: Negative for ear pain and sore throat. Eyes: Negative for blurred vision and pain. Respiratory: Negative for cough and shortness of breath. Cardiovascular: Negative for chest pain. Gastrointestinal: Positive for diarrhea (chronic). Negative for abdominal pain (she denies abdominal pain at the time of her visit), blood in stool and melena. Genitourinary: Negative for dysuria and hematuria. Musculoskeletal: Negative for joint pain and myalgias. Skin: Negative for rash. Neurological: Negative for tingling, focal weakness and headaches (HA resolved by time of her apt). Endo/Heme/Allergies: Does not bruise/bleed easily. Psychiatric/Behavioral: Negative for substance abuse and suicidal ideas. The patient is nervous/anxious. Objective     Vitals:    06/08/18 1415   BP: 122/57   Pulse: 83   Resp: 12   Temp: 96.9 °F (36.1 °C)   TempSrc: Oral   SpO2: 97%   Weight: 198 lb 6.4 oz (90 kg)   Height: 5' 4\" (1.626 m)   PainSc:   2   PainLoc: Head       Physical Exam   Constitutional: She is oriented to person, place, and time and well-developed, well-nourished, and in no distress. HENT:   Head: Normocephalic and atraumatic.    Right Ear: External ear normal.   Left Ear: External ear normal.   Nose: Nose normal.   Mouth/Throat: Oropharynx is clear and moist. No oropharyngeal exudate. Eyes: Conjunctivae and EOM are normal. Pupils are equal, round, and reactive to light. Right eye exhibits no discharge. Left eye exhibits no discharge. No scleral icterus. Neck: Neck supple. Cardiovascular: Normal rate, regular rhythm, normal heart sounds and intact distal pulses. Exam reveals no gallop and no friction rub. No murmur heard. Pulmonary/Chest: Effort normal and breath sounds normal. No respiratory distress. She has no wheezes. She has no rales. Abdominal: Soft. Bowel sounds are normal. She exhibits no distension. There is no tenderness. There is no rebound and no guarding. Musculoskeletal: She exhibits no edema or tenderness (BUE). Lymphadenopathy:     She has no cervical adenopathy. Neurological: She is alert and oriented to person, place, and time. She exhibits normal muscle tone. Gait normal.   Skin: Skin is warm and dry. No erythema. Psychiatric:   +Anxious affect. She has a hard time staying on one topic. Mild flight of ideas. Nursing note and vitals reviewed.       LABS   Data Review:   Lab Results   Component Value Date/Time    WBC 9.3 09/13/2016 03:20 PM    HGB 13.7 09/13/2016 03:20 PM    HCT 40.6 09/13/2016 03:20 PM    PLATELET 671 64/20/8732 03:20 PM    MCV 92.9 09/13/2016 03:20 PM       Lab Results   Component Value Date/Time    Sodium 142 09/13/2016 03:20 PM    Potassium 4.2 09/13/2016 03:20 PM    Chloride 107 09/13/2016 03:20 PM    CO2 26 09/13/2016 03:20 PM    Anion gap 9 09/13/2016 03:20 PM    Glucose 79 09/13/2016 03:20 PM    BUN 6 (L) 09/13/2016 03:20 PM    Creatinine 0.64 09/13/2016 03:20 PM    BUN/Creatinine ratio 9 (L) 09/13/2016 03:20 PM    GFR est AA >60 09/13/2016 03:20 PM    GFR est non-AA >60 09/13/2016 03:20 PM    Calcium 8.8 09/13/2016 03:20 PM       Lab Results   Component Value Date/Time    Cholesterol, total 187 07/07/2015 04:26 PM    HDL Cholesterol 35 (L) 07/07/2015 04:26 PM    LDL, calculated 112 (H) 07/07/2015 04:26 PM VLDL, calculated 40 07/07/2015 04:26 PM    Triglyceride 198 (H) 07/07/2015 04:26 PM    CHOL/HDL Ratio 5.8 (H) 10/18/2012 01:30 PM       Lab Results   Component Value Date/Time    Hemoglobin A1c 5.4 10/18/2012 01:30 PM       Assessment/Plan:   HAs, Bipolar Disorder, Chronic Abdominal Pain, and Polypharmacy:   I am not sure what medications she is taking and I cannot believe that her Psychiatry team would approve of her self adjusting her medications. Some of her anxiety is environmental. For this, I recommended that she seek counseling services as recommended by her Psychiatry team per her hx. I will order a Pharmacy consult to assist with medication management. I told her to have her pill bottles ready for review with our clinical pharmacy team. She should not be taking 2 medications in the same drug class. Additionally, she is taking stimulating and depressing medications that directly interact such as benzodiazepine rx and Adderall. I recommended that she discuss our clinical pharmacy team's recommendations with her psychiatry team. Her HAs are likely from stress. By the time of her apt, HA pain had resolved. I wrote a work note for just today as I cannot justify her missing 3 days of work, having only evaluated her today - all of which was discussed with her today. Lastly, in regard to her abdominal pain, I instructed her to limit her bread intake and to maintain a gluten free diet. She should follow up with the GI team for her scheduled colonoscopy to r/o any GI issues. She likely has IBS but this is truly a diagnosis of exclusion. Interestingly enough, she had no abdominal discomfort today or during her PE. Health Maintenance Due   Topic Date Due    PAP AKA CERVICAL CYTOLOGY  07/22/2018       Lab review: labs are reviewed in the EHR    I have discussed the diagnosis with the patient and the intended plan as seen in the above orders.   The patient has received an after-visit summary and questions were answered concerning future plans. I have discussed medication side effects and warnings with the patient as well. I have reviewed the plan of care with the patient, accepted their input and they are in agreement with the treatment goals. All questions were answered. The patient understands the plan of care. Handouts provided today with above information. Pt instructed if symptoms worsen to call the office or report to the ED for continued care. Greater than 50% of the visit time was spent in counseling and/or coordination of care. Voice recognition was used to generate this report, which may have resulted in some phonetic based errors in grammar and contents. Even though attempts were made to correct all the mistakes, some may have been missed, and remained in the body of the document.     Chirstie Arce MD

## 2018-06-08 NOTE — MR AVS SNAPSHOT
[address] 
[phone] Patient: Oral Hattie MRN: [id number] YIC:[birth year] Visit Information Date & Time Provider Department Dept. Phone Encounter #  
 6/8/2018  2:00 PM Reyna Haskins MD [facility] [id number] Upcoming Health Maintenance Date Due  
 PAP AKA CERVICAL CYTOLOGY 7/22/2018 Influenza Age 5 to Adult 8/1/2018 DTaP/Tdap/Td series (2 - Td) 7/16/2022 Allergies as of 6/8/2018  Review Complete On: 6/8/2018 By: Reyna Haskins MD  
  
 Severity Noted Reaction Type Reactions Prozac [Fluoxetine] Low 02/11/2012   Not Verified Unknown (comments), Other (comments) Current Immunizations  Reviewed on 1/31/2018 Name Date Influenza Vaccine Dimitri Perry) 9/26/2016 Influenza Vaccine (Quad) PF 10/9/2017 PPD 11/15/2010 Pneumococcal Polysaccharide (PPSV-23) 9/26/2016 Tdap 7/16/2012 12:00 AM  
  
 Not reviewed this visit Vitals BP Pulse Temp Resp Height(growth percentile) Weight(growth percentile) 122/57 (BP 1 Location: Right arm, BP Patient Position: Sitting) 83 96.9 °F (36.1 °C) (Oral) 12 5' 4\" (1.626 m) 198 lb 6.4 oz (90 kg) SpO2 BMI OB Status Smoking Status 97% 34.06 kg/m2 Unknown Current Every Day Smoker BMI and BSA Data Body Mass Index Body Surface Area 34.06 kg/m 2 2.02 m 2 Preferred Pharmacy Pharmacy Name Phone [facility] [address] [phone] Your Updated Medication List  
  
   
This list is accurate as of 6/8/18  2:55 PM.  Always use your most recent med list.  
  
  
  
  
 ADDERALL 20 mg tablet Generic drug:  dextroamphetamine-amphetamine Take 20 mg by mouth three (3) times daily. * albuterol 90 mcg/actuation inhaler Commonly known as:  PROVENTIL HFA, VENTOLIN HFA, PROAIR HFA  
 Take 2 Puffs by inhalation every four (4) hours as needed for Wheezing. Indications: BRONCHOSPASM PREVENTION  
  
 * albuterol 90 mcg/actuation inhaler Commonly known as:  PROVENTIL HFA, VENTOLIN HFA, PROAIR HFA Take 2 Puffs by inhalation every four (4) hours as needed for Wheezing. Biotin 2,500 mcg Cap Take  by mouth daily. BUSPAR PO Take 10 mg by mouth two (2) times a day. clonazePAM 0.5 mg tablet Commonly known as:  Cherylyn Rafter Take 0.5 mg by mouth three (3) times daily as needed. inhalational spacing device 1 Each by Does Not Apply route as needed. lamoTRIgine 100 mg tablet Commonly known as: LaMICtal  
200 mg. QVAR 80 mcg/actuation Chip Estimate Generic drug:  beclomethasone INHALE 1 PUFF TWO TIMES A DAY  
  
 * SEROquel 50 mg tablet Generic drug:  QUEtiapine Take 50 mg by mouth daily. * SEROquel XR 50 mg sr tablet Generic drug:  QUEtiapine SR Take 50 mg by mouth daily. ziprasidone 20 mg capsule Commonly known as:  GEODON  
20 mg.  
  
 * Notice: This list has 4 medication(s) that are the same as other medications prescribed for you. Read the directions carefully, and ask your doctor or other care provider to review them with you. Patient Instructions Health Maintenance Due Topic Date Due  
 PAP AKA CERVICAL CYTOLOGY  07/22/2018 Gluten-Free Diet: Care Instructions Your Care Instructions To help your symptoms, your doctor has recommended a gluten-free diet. This means not eating foods that have gluten in them. Gluten is a kind of protein. It's found in wheat, barley, and rye. If you eat a gluten-free diet, you can help manage your symptoms and prevent long-term problems. You can also get all the nutrition you need. Follow-up care is a key part of your treatment and safety.  Be sure to make and go to all appointments, and call your doctor if you are having problems. It's also a good idea to know your test results and keep a list of the medicines you take. How can you care for yourself at home? · Don't eat any foods that have gluten in them. These include bagels, bread, crackers, and some cereals. They also include pasta and pizza. · Carefully read food labels. Look for wheat or wheat products in ice cream and candy. You may also find them in salad dressing, canned and frozen soups and vegetables, and other processed foods. · Avoid all beer products unless the label says they are gluten-free. Beers with and without alcohol have gluten unless the labels say they are gluten-free. This includes lagers, ales, and stouts. · Avoid oats, at least at first. Oats may cause symptoms in some people, perhaps as a result of contamination with wheat, barley, or rye during processing. But many people who have celiac disease can eat moderate amounts of oats without having symptoms. Health professionals vary in their long-term recommendations regarding eating foods with oats. But most agree it is safe to eat oats labeled as gluten-free. · When you eat out, look for restaurants that serve gluten-free food. You can also ask if the  is familiar with gluten-free cooking. · Try to learn more about gluten-free options. Find grocery stores that sell gluten-free pizza and other foods. If you have access to the Internet, look online for gluten-free foods and recipes. · On a gluten-free eating plan, it's okay to have: 
¨ Eggs and dairy products. (But some dairy products may make your symptoms worse. Ask your doctor if you have questions about dairy products. Read ingredient labels carefully. Some processed cheeses contain gluten.) ¨ Flours and foods made with amaranth, arrowroot, beans, buckwheat, corn, cornmeal, flax, millet, potatoes, gluten-free nut and oat bran, quinoa, rice, sorghum, soybeans, tapioca, or teff. ¨ Fresh, frozen, or canned unprocessed meats. But avoid processed meats. Some examples of processed meats to avoid are hot dogs, salami, and deli meat. Read labels for additives that may contain gluten. ¨ Fresh, frozen, dried, or canned fruits and vegetables, if they do not have thickeners or other additives that contain gluten. ¨ Some alcohol drinks. These include wine, liqueurs, and ciders. They also include liquor like whiskey and ewa. When should you call for help? Watch closely for changes in your health, and be sure to contact your doctor if: 
? · You have unexplained weight loss. ? · You have diarrhea that lasts longer than 1 to 2 weeks. ? · You have unusual fatigue or mood changes, especially if these last more than a week and are not related to any other illness, such as the flu. ? · Your symptoms come back again. ? · Your stomach pain gets worse. Where can you learn more? Go to http://tommie-nicole.info/. Enter 31 41 19 in the search box to learn more about \"Gluten-Free Diet: Care Instructions. \" Current as of: May 12, 2017 Content Version: 11.4 © 8777-7043 [employer]. Care instructions adapted under license by [employer] (which disclaims liability or warranty for this information). If you have questions about a medical condition or this instruction, always ask your healthcare professional. [address] any warranty or liability for your use of this information. Introducing [facility]! Angelique Powell introduces [employer] patient portal. Now you can access parts of your medical record, email your doctor's office, and request medication refills online. 1. In your internet browser, go to [email or web address] 2. Click on the First Time User? Click Here link in the Sign In box. You will see the New Member Sign Up page. 3. Enter your [employer] Access Code exactly as it appears below.  You will not need to use this code after youve completed the sign-up process. If you do not sign up before the expiration date, you must request a new code. · fan[employer]ix Access Code: [email or web address] Expires: 9/6/2018  2:55 PM 
 
4. Enter the last four digits of your Social Security Number (xxxx) and Date of Birth (mm/dd/yyyy) as indicated and click Submit. You will be taken to the next sign-up page. 5. Create a fanatix ID. This will be your fanatix login ID and cannot be changed, so think of one that is secure and easy to remember. 6. Create a fanatix password. You can change your password at any time. 7. Enter your Password Reset Question and Answer. This can be used at a later time if you forget your password. 8. Enter your e-mail address. You will receive e-mail notification when new information is available in [address]. 9. Click Sign Up. You can now view and download portions of your medical record. 10. Click the Download Summary menu link to download a portable copy of your medical information. If you have questions, please visit the Frequently Asked Questions section of the fan[employer]ix website. Remember, fan[employer]ix is NOT to be used for urgent needs. For medical emergencies, dial 911. Now available from your iPhone and Android! Please provide this summary of care documentation to your next provider. Your primary care clinician is listed as Kizzy Mora. If you have any questions after today's visit, please call [phone].

## 2018-06-12 ENCOUNTER — TELEPHONE (OUTPATIENT)
Dept: INTERNAL MEDICINE CLINIC | Age: 38
End: 2018-06-12

## 2018-06-12 NOTE — TELEPHONE ENCOUNTER
Left message for patient regarding consult placed by Dr Cielo Johnson to meet with patient to review her current medication regimen. Will attempt to reach out to patient at a later date.      Alvaro Deluca, PharmD, BCPS, BCACP, BC-ADM

## 2018-10-03 ENCOUNTER — OFFICE VISIT (OUTPATIENT)
Dept: INTERNAL MEDICINE CLINIC | Age: 38
End: 2018-10-03

## 2018-10-03 VITALS
DIASTOLIC BLOOD PRESSURE: 68 MMHG | BODY MASS INDEX: 34.21 KG/M2 | RESPIRATION RATE: 14 BRPM | HEIGHT: 64 IN | TEMPERATURE: 98.6 F | SYSTOLIC BLOOD PRESSURE: 104 MMHG | HEART RATE: 84 BPM | WEIGHT: 200.4 LBS | OXYGEN SATURATION: 97 %

## 2018-10-03 DIAGNOSIS — J45.30 MILD PERSISTENT ASTHMA WITHOUT COMPLICATION: ICD-10-CM

## 2018-10-03 DIAGNOSIS — F17.200 TOBACCO DEPENDENCE: ICD-10-CM

## 2018-10-03 DIAGNOSIS — R05.9 COUGH: Primary | ICD-10-CM

## 2018-10-03 DIAGNOSIS — D22.9 MULTIPLE ATYPICAL SKIN MOLES: ICD-10-CM

## 2018-10-03 DIAGNOSIS — R23.3 PETECHIAE: ICD-10-CM

## 2018-10-03 RX ORDER — ALBUTEROL SULFATE 90 UG/1
2 AEROSOL, METERED RESPIRATORY (INHALATION)
Qty: 1 INHALER | Refills: 2 | Status: SHIPPED | OUTPATIENT
Start: 2018-10-03 | End: 2019-02-13 | Stop reason: ALTCHOICE

## 2018-10-03 RX ORDER — IBUPROFEN 200 MG
TABLET ORAL
Qty: 30 PATCH | Refills: 1 | Status: SHIPPED | OUTPATIENT
Start: 2018-10-03 | End: 2019-02-13 | Stop reason: ALTCHOICE

## 2018-10-03 RX ORDER — BENZONATATE 100 MG/1
100 CAPSULE ORAL
Qty: 84 CAP | Refills: 0 | Status: SHIPPED | OUTPATIENT
Start: 2018-10-03 | End: 2019-05-21 | Stop reason: SDUPTHER

## 2018-10-03 RX ORDER — PREDNISONE 20 MG/1
TABLET ORAL
Qty: 10 TAB | Refills: 0 | Status: SHIPPED | OUTPATIENT
Start: 2018-10-03 | End: 2019-02-13 | Stop reason: ALTCHOICE

## 2018-10-03 NOTE — PROGRESS NOTES
Ruth Whaley is a 34 y.o.  female and presents with    Chief Complaint   Patient presents with    Cold Symptoms     cough, chest congestion started 9/29/18- has taken dayquil/nyquil       Subjective:  HPI  Mrs. Mihaela Norris presents today QVAR she needs refills, Albuterol needs refills. She is with a cough and chest congestion that started 9/29/18. 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 She has been using Dayquil/Nyquil with some improvement. She reports was up coughing all night. The cough is dry. Denies fever, chills, she is increased tiredness. Smoking greater than a pack a day, smokes as soon as wakes. She reports psychiatry wanted to put her on Welbutrin however her sister passed away while on it so she declined. Additional Concerns: none     ROS   Review of Systems   Constitutional: Positive for malaise/fatigue. Negative for chills and fever. HENT: Negative. Respiratory: Positive for cough. Negative for hemoptysis, sputum production, shortness of breath and wheezing. Cardiovascular: Negative. Genitourinary: Negative. Skin: Negative. Neurological: Negative. Allergies   Allergen Reactions    Prozac [Fluoxetine] Unknown (comments) and Other (comments)       Current Outpatient Prescriptions   Medication Sig Dispense Refill    albuterol (PROVENTIL HFA, VENTOLIN HFA, PROAIR HFA) 90 mcg/actuation inhaler Take 2 Puffs by inhalation every four (4) hours as needed for Wheezing. 1 Inhaler 2    beclomethasone (QVAR) 80 mcg/actuation aero INHALE 1 PUFF TWO TIMES A DAY 1 Inhaler 2    venlafaxine HCl (EFFEXOR PO) Take  by mouth.  benzonatate (TESSALON) 100 mg capsule Take 1 Cap by mouth three (3) times daily as needed for Cough. 84 Cap 0    predniSONE (DELTASONE) 20 mg tablet Take 2 tablets by mouth for 5 days 10 Tab 0    nicotine (NICODERM CQ) 21 mg/24 hr Place one patch for 24 hours then remove and replace with new patch. Indications: Smoking Cessation 30 Patch 1    ziprasidone (GEODON) 20 mg capsule 20 mg.      lamoTRIgine (LAMICTAL) 100 mg tablet 200 mg.  clonazePAM (KLONOPIN) 0.5 mg tablet Take 0.5 mg by mouth three (3) times daily as needed.  dextroamphetamine-amphetamine (ADDERALL) 20 mg tablet Take 20 mg by mouth three (3) times daily.  QUEtiapine SR (SEROQUEL XR) 50 mg sr tablet Take 50 mg by mouth daily.  QUEtiapine (SEROQUEL) 50 mg tablet Take 50 mg by mouth daily.  BUSPIRONE HCL (BUSPAR PO) Take 10 mg by mouth two (2) times a day.  albuterol (PROVENTIL HFA, VENTOLIN HFA, PROAIR HFA) 90 mcg/actuation inhaler Take 2 Puffs by inhalation every four (4) hours as needed for Wheezing. Indications: BRONCHOSPASM PREVENTION 1 Inhaler 3    Biotin 2,500 mcg cap Take  by mouth daily.  inhalational spacing device 1 Each by Does Not Apply route as needed. 1 Device 1       Social History     Social History    Marital status: SINGLE     Spouse name: N/A    Number of children: N/A    Years of education: N/A     Occupational History    Not on file. Social History Main Topics    Smoking status: Current Every Day Smoker     Packs/day: 2.00     Years: 23.00     Types: Cigarettes     Start date: 2/9/1992    Smokeless tobacco: Never Used    Alcohol use 1.2 oz/week     2 Glasses of wine per week      Comment: 2x week and on weekends: 1-6 drinks/sitting    Drug use: No    Sexual activity: Yes      Comment: condoms     Other Topics Concern    Not on file     Social History Narrative       Past Medical History:   Diagnosis Date    Asthma     Bipolar 1 disorder ([facility] [address]     Followed by [facility], depression and anxiety    Genital HSV     GERD (gastroesophageal reflux disease) 4/13/2017    IBS (irritable bowel syndrome)     Onychomycosis     was on lamisol for this 3 mos ago. Was on it for 6 mos.      Psychiatric disorder     Reactive airway disease     Tinea pedis Past Surgical History:   Procedure Laterality Date    HX COLONOSCOPY      age 22 for diarrhea    HX CYST REMOVAL      hand    HX GYN      HX ORTHOPAEDIC      right hand    HX OVARIAN CYST REMOVAL      right ovary       Family History   Problem Relation Age of Onset   Judy.Grapes Arthritis-osteo Mother    Judy.Grapes Migraines Mother     Stroke Mother     Alcohol abuse Father     Cancer Father     Hypertension Father     Asthma Sister     Diabetes Sister     Heart Disease Sister     Stroke Maternal Grandmother     Diabetes Maternal Grandfather     COPD Maternal Grandfather     No Known Problems Paternal Grandmother     No Known Problems Paternal Grandfather        Objective:  Vitals:    10/03/18 1517   BP: 104/68   Pulse: 84   Resp: 14   Temp: 98.6 °F (37 °C)   TempSrc: Oral   SpO2: 97%   Weight: 200 lb 6.4 oz (90.9 kg)   Height: 5' 4\" (1.626 m)   PainSc:   0 - No pain       LABS   Results for orders placed or performed during the hospital encounter of 01/31/18   URINALYSIS W/MICROSCOPIC   Result Value Ref Range    Color YELLOW      Appearance CLEAR      Specific gravity 1.006 1.005 - 1.030      pH (UA) 6.0 5.0 - 8.0      Protein NEGATIVE  NEG mg/dL    Glucose NEGATIVE  NEG mg/dL    Ketone NEGATIVE  NEG mg/dL    Bilirubin NEGATIVE  NEG      Blood NEGATIVE  NEG      Urobilinogen 0.2 0.2 - 1.0 EU/dL    Nitrites NEGATIVE  NEG      Leukocyte Esterase NEGATIVE  NEG      WBC 0 to 3 0 - 4 /hpf    RBC 0 0 - 5 /hpf    Epithelial cells FEW 0 - 5 /lpf    Bacteria NEGATIVE  NEG /hpf       TESTS  none    PE  Physical Exam   Constitutional: She is oriented to person, place, and time. She appears well-developed and well-nourished. No distress. HENT:   Head: Normocephalic and atraumatic. Eyes: Conjunctivae and EOM are normal.   Neck: Normal range of motion. Neck supple. No tracheal deviation present. No thyromegaly present. Cardiovascular: Normal rate, regular rhythm, normal heart sounds and intact distal pulses. Pulmonary/Chest: Effort normal and breath sounds normal. No respiratory distress. She has no wheezes. She has no rales. She exhibits no tenderness. No accessory muscle use, cough noted x 1 during examination   Abdominal: Soft. Bowel sounds are normal.   Lymphadenopathy:     She has no cervical adenopathy. Neurological: She is alert and oriented to person, place, and time. Skin: Skin is warm and dry. She is not diaphoretic. Psychiatric: She has a normal mood and affect. Her behavior is normal. Judgment and thought content normal.   Vitals reviewed. Assessment/Plan:    1. Viral bronchitis possibly aggravating asthma- Refilled albuterol and Qvar. Tessalon and prednisone prescribed. Follow up if worsening symptoms. 2. Tobacco abuse- she would like to try the Nicotine patches again. 21 mcg x 6 weeks then wean to 14mcg and then 7 mcg. 3. She is requesting a referral to a  Dermatologist related to multiple generalized moles, nevi. She is also with what looks like petechiae to abdomen and back, not noted to extremities. Labs ordered to be completed tomorrow while fasting. Lab review: orders written for new lab studies as appropriate; see orders    Today's Visit:   Diagnoses and all orders for this visit:    1. Cough  -     albuterol (PROVENTIL HFA, VENTOLIN HFA, PROAIR HFA) 90 mcg/actuation inhaler; Take 2 Puffs by inhalation every four (4) hours as needed for Wheezing.  -     beclomethasone (QVAR) 80 mcg/actuation aero; INHALE 1 PUFF TWO TIMES A DAY  -     benzonatate (TESSALON) 100 mg capsule; Take 1 Cap by mouth three (3) times daily as needed for Cough. -     predniSONE (DELTASONE) 20 mg tablet; Take 2 tablets by mouth for 5 days  -     PROTHROMBIN TIME + INR; Future  -     PTT; Future    2. Mild persistent asthma without complication  -     albuterol (PROVENTIL HFA, VENTOLIN HFA, PROAIR HFA) 90 mcg/actuation inhaler;  Take 2 Puffs by inhalation every four (4) hours as needed for Wheezing.  - beclomethasone (QVAR) 80 mcg/actuation aero; INHALE 1 PUFF TWO TIMES A DAY  -     benzonatate (TESSALON) 100 mg capsule; Take 1 Cap by mouth three (3) times daily as needed for Cough. -     predniSONE (DELTASONE) 20 mg tablet; Take 2 tablets by mouth for 5 days  -     PROTHROMBIN TIME + INR; Future  -     PTT; Future    3. Multiple atypical skin moles  -     REFERRAL TO DERMATOLOGY    4. Petechiae  -     REFERRAL TO DERMATOLOGY    5. Tobacco dependence  -     nicotine (NICODERM CQ) 21 mg/24 hr; Place one patch for 24 hours then remove and replace with new patch. Indications: Smoking Cessation      Health Maintenance: To be addressed on follow up. I have discussed the diagnosis with the patient and the intended plan as seen in the above orders. The patient has received an after-visit summary and questions were answered concerning future plans. I have discussed medication side effects and warnings with the patient as well. I have reviewed the plan of care with the patient, accepted their input and they are in agreement with the treatment goals. Follow-up Disposition: Not on File   More than 1/2 of this 30 minute visit was spent in counseling and coordination of care, as described above.     GALE Stanton  Internist of [address], [address] [place] [address].  Phone: [phone]  Fax: [phone]

## 2018-10-03 NOTE — PATIENT INSTRUCTIONS
· If you use tobacco products (inhaled/smokeless tobacco) WITHIN 30 minutes of waking up then use 21mg nicotine patch for 6 weeks as below. Weeks 1-6 Weeks 7-9 Weeks 10-12   1 patch every day 21 mcg 1 patch every  14 mcg 1 patch every 7 mcg     Resources:  American Lung Association, American Cancer Society, American Heart Association, www. [employer]. com, (additional resources by researching yourself).

## 2018-10-03 NOTE — PROGRESS NOTES
1. Have you been to the ER, urgent care clinic or hospitalized since your last visit? NO.     2. Have you seen or consulted any other health care providers outside of the [address] [place] [address] since your last visit (Include any pap smears or colon screening)? NO      Do you have an Advanced Directive? NO    Would you like information on Advanced Directives?  NO

## 2018-10-05 NOTE — TELEPHONE ENCOUNTER
qvar is not available anymore they only make  ready inhaler    [facility] on [facility]

## 2018-10-08 DIAGNOSIS — J44.9 COPD WITH ASTHMA (HCC): Primary | ICD-10-CM

## 2018-10-08 RX ORDER — FLUTICASONE PROPIONATE 44 UG/1
1 AEROSOL, METERED RESPIRATORY (INHALATION) 2 TIMES DAILY
Qty: 1 INHALER | Refills: 1 | Status: SHIPPED | OUTPATIENT
Start: 2018-10-08 | End: 2019-02-13 | Stop reason: SDUPTHER

## 2018-11-24 ENCOUNTER — APPOINTMENT (OUTPATIENT)
Dept: CT IMAGING | Age: 38
End: 2018-11-24
Attending: EMERGENCY MEDICINE
Payer: SELF-PAY

## 2018-11-24 ENCOUNTER — HOSPITAL ENCOUNTER (EMERGENCY)
Age: 38
Discharge: HOME | End: 2018-11-24
Attending: EMERGENCY MEDICINE
Payer: SELF-PAY

## 2018-11-24 VITALS
HEART RATE: 95 BPM | HEIGHT: 65 IN | OXYGEN SATURATION: 96 % | DIASTOLIC BLOOD PRESSURE: 75 MMHG | WEIGHT: 200 LBS | SYSTOLIC BLOOD PRESSURE: 131 MMHG | RESPIRATION RATE: 16 BRPM | TEMPERATURE: 98.9 F | BODY MASS INDEX: 33.32 KG/M2

## 2018-11-24 DIAGNOSIS — R51.9 HEADACHE ON TOP OF HEAD: Primary | ICD-10-CM

## 2018-11-24 DIAGNOSIS — S16.1XXA STRAIN OF NECK MUSCLE, INITIAL ENCOUNTER: ICD-10-CM

## 2018-11-24 PROCEDURE — 70450 CT HEAD/BRAIN W/O DYE: CPT

## 2018-11-24 PROCEDURE — 99282 EMERGENCY DEPT VISIT SF MDM: CPT

## 2018-11-24 PROCEDURE — 72125 CT NECK SPINE W/O DYE: CPT
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
## 2018-11-25 NOTE — ED NOTES
Pt ambulatory to treatment room with steady gait, gowned, primary nurse aware; pt has visitor at bedside

## 2018-11-25 NOTE — ED NOTES
I have reviewed discharge instructions with the patient. The patient verbalized understanding. Patient armband removed and shredded Current Discharge Medication List

## 2018-11-25 NOTE — ED NOTES
Pt unable to provide urine sample, she's ok for CT scan to be done without HCG test, MD aware, pt claimed no sexual activity for past 6 months (100% not pregnant at all as per patient)

## 2018-11-25 NOTE — DISCHARGE INSTRUCTIONS
Headache: Care Instructions  Your Care Instructions    Headaches have many possible causes. Most headaches aren't a sign of a more serious problem, and they will get better on their own. Home treatment may help you feel better faster. The doctor has checked you carefully, but problems can develop later. If you notice any problems or new symptoms, get medical treatment right away. Follow-up care is a key part of your treatment and safety. Be sure to make and go to all appointments, and call your doctor if you are having problems. It's also a good idea to know your test results and keep a list of the medicines you take. How can you care for yourself at home? · Do not drive if you have taken a prescription pain medicine. · Rest in a quiet, dark room until your headache is gone. Close your eyes and try to relax or go to sleep. Don't watch TV or read. · Put a cold, moist cloth or cold pack on the painful area for 10 to 20 minutes at a time. Put a thin cloth between the cold pack and your skin. · Use a warm, moist towel or a heating pad set on low to relax tight shoulder and neck muscles. · Have someone gently massage your neck and shoulders. · Take pain medicines exactly as directed. ? If the doctor gave you a prescription medicine for pain, take it as prescribed. ? If you are not taking a prescription pain medicine, ask your doctor if you can take an over-the-counter medicine. · Be careful not to take pain medicine more often than the instructions allow, because you may get worse or more frequent headaches when the medicine wears off. · Do not ignore new symptoms that occur with a headache, such as a fever, weakness or numbness, vision changes, or confusion. These may be signs of a more serious problem. To prevent headaches  · Keep a headache diary so you can figure out what triggers your headaches. Avoiding triggers may help you prevent headaches.  Record when each headache began, how long it lasted, and what the pain was like (throbbing, aching, stabbing, or dull). Write down any other symptoms you had with the headache, such as nausea, flashing lights or dark spots, or sensitivity to bright light or loud noise. Note if the headache occurred near your period. List anything that might have triggered the headache, such as certain foods (chocolate, cheese, wine) or odors, smoke, bright light, stress, or lack of sleep. · Find healthy ways to deal with stress. Headaches are most common during or right after stressful times. Take time to relax before and after you do something that has caused a headache in the past.  · Try to keep your muscles relaxed by keeping good posture. Check your jaw, face, neck, and shoulder muscles for tension, and try relaxing them. When sitting at a desk, change positions often, and stretch for 30 seconds each hour. · Get plenty of sleep and exercise. · Eat regularly and well. Long periods without food can trigger a headache. · Treat yourself to a massage. Some people find that regular massages are very helpful in relieving tension. · Limit caffeine by not drinking too much coffee, tea, or soda. But don't quit caffeine suddenly, because that can also give you headaches. · Reduce eyestrain from computers by blinking frequently and looking away from the computer screen every so often. Make sure you have proper eyewear and that your monitor is set up properly, about an arm's length away. · Seek help if you have depression or anxiety. Your headaches may be linked to these conditions. Treatment can both prevent headaches and help with symptoms of anxiety or depression. When should you call for help? Call 911 anytime you think you may need emergency care. For example, call if:    · You have signs of a stroke. These may include:  ? Sudden numbness, paralysis, or weakness in your face, arm, or leg, especially on only one side of your body. ? Sudden vision changes.   ? Sudden trouble speaking. ? Sudden confusion or trouble understanding simple statements. ? Sudden problems with walking or balance. ? A sudden, severe headache that is different from past headaches.    Call your doctor now or seek immediate medical care if:    · You have a new or worse headache.     · Your headache gets much worse. Where can you learn more? Go to http://tommie-nicole.info/. Enter M271 in the search box to learn more about \"Headache: Care Instructions. \"  Current as of: June 4, 2018  Content Version: 11.8  © 2973-9679 [employer]. Care instructions adapted under license by [employer] (which disclaims liability or warranty for this information). If you have questions about a medical condition or this instruction, always ask your healthcare professional. [address] any warranty or liability for your use of this information. Neck Strain: Care Instructions  Your Care Instructions    You have strained the muscles and ligaments in your neck. A sudden, awkward movement can strain the neck. This often occurs with falls or car accidents or during certain sports. Everyday activities like working on a computer or sleeping can also cause neck strain if they force you to hold your neck in an awkward position for a long time. It is common for neck pain to get worse for a day or two after an injury, but it should start to feel better after that. You may have more pain and stiffness for several days before it gets better. This is expected. It may take a few weeks or longer for it to heal completely. Good home treatment can help you get better faster and avoid future neck problems. Follow-up care is a key part of your treatment and safety. Be sure to make and go to all appointments, and call your doctor if you are having problems. It's also a good idea to know your test results and keep a list of the medicines you take.   How can you care for yourself at home? · If you were given a neck brace (cervical collar) to limit neck motion, wear it as instructed for as many days as your doctor tells you to. Do not wear it longer than you were told to. Wearing a brace for too long can make neck stiffness worse and weaken the neck muscles. · You can try using heat or ice to see if it helps. ? Try using a heating pad on a low or medium setting for 15 to 20 minutes every 2 to 3 hours. Try a warm shower in place of one session with the heating pad. You can also buy single-use heat wraps that last up to 8 hours. ? You can also try an ice pack for 10 to 15 minutes every 2 to 3 hours. · Take pain medicines exactly as directed. ? If the doctor gave you a prescription medicine for pain, take it as prescribed. ? If you are not taking a prescription pain medicine, ask your doctor if you can take an over-the-counter medicine. · Gently rub the area to relieve pain and help with blood flow. Do not massage the area if it hurts to do so. · Do not do anything that makes the pain worse. Take it easy for a couple of days. You can do your usual activities if they do not hurt your neck or put it at risk for more stress or injury. · Try sleeping on a special neck pillow. Place it under your neck, not under your head. Placing a tightly rolled-up towel under your neck while you sleep will also work. If you use a neck pillow or rolled towel, do not use your regular pillow at the same time. · To prevent future neck pain, do exercises to stretch and strengthen your neck and back. Learn how to use good posture, safe lifting techniques, and proper body mechanics. When should you call for help? Call 911 anytime you think you may need emergency care. For example, call if:    · You are unable to move an arm or a leg at all.   [facility] your doctor now or seek immediate medical care if:    · You have new or worse symptoms in your arms, legs, chest, belly, or buttocks.  Symptoms may include:  ? Numbness or tingling. ? Weakness. ? Pain.     · You lose bladder or bowel control.    Watch closely for changes in your health, and be sure to contact your doctor if:    · You are not getting better as expected. Where can you learn more? Go to http://tommie-nicole.info/. Enter M253 in the search box to learn more about \"Neck Strain: Care Instructions. \"  Current as of: November 29, 2017  Content Version: 11.8  © 3796-3175 [employer]. Care instructions adapted under license by [employer] (which disclaims liability or warranty for this information). If you have questions about a medical condition or this instruction, always ask your healthcare professional. [address] any warranty or liability for your use of this information.
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
## 2019-02-13 ENCOUNTER — OFFICE VISIT (OUTPATIENT)
Dept: INTERNAL MEDICINE CLINIC | Age: 39
End: 2019-02-13

## 2019-02-13 VITALS
WEIGHT: 210 LBS | SYSTOLIC BLOOD PRESSURE: 129 MMHG | BODY MASS INDEX: 34.99 KG/M2 | RESPIRATION RATE: 16 BRPM | DIASTOLIC BLOOD PRESSURE: 68 MMHG | HEIGHT: 65 IN | TEMPERATURE: 98.1 F | OXYGEN SATURATION: 97 % | HEART RATE: 93 BPM

## 2019-02-13 DIAGNOSIS — M62.830 MUSCLE SPASM OF BACK: Primary | ICD-10-CM

## 2019-02-13 DIAGNOSIS — J44.9 COPD WITH ASTHMA (HCC): ICD-10-CM

## 2019-02-13 DIAGNOSIS — L98.9 SKIN LESIONS: ICD-10-CM

## 2019-02-13 DIAGNOSIS — M54.9 UPPER BACK PAIN ON LEFT SIDE: ICD-10-CM

## 2019-02-13 DIAGNOSIS — R20.2 ARM PARESTHESIA, LEFT: ICD-10-CM

## 2019-02-13 DIAGNOSIS — Z00.00 ROUTINE ADULT HEALTH MAINTENANCE: ICD-10-CM

## 2019-02-13 RX ORDER — PRAZOSIN HYDROCHLORIDE 2 MG/1
2 CAPSULE ORAL
COMMUNITY
Start: 2019-02-12

## 2019-02-13 RX ORDER — NAPROXEN 500 MG/1
500 TABLET ORAL 2 TIMES DAILY WITH MEALS
Qty: 60 TAB | Refills: 0 | Status: SHIPPED | OUTPATIENT
Start: 2019-02-13 | End: 2019-06-07 | Stop reason: SDUPTHER

## 2019-02-13 RX ORDER — CYCLOBENZAPRINE HCL 10 MG
5 TABLET ORAL
Qty: 15 TAB | Refills: 0 | Status: SHIPPED | OUTPATIENT
Start: 2019-02-13 | End: 2019-06-07 | Stop reason: SDUPTHER

## 2019-02-13 RX ORDER — MEDROXYPROGESTERONE ACETATE 150 MG/ML
INJECTION, SUSPENSION INTRAMUSCULAR
Refills: 3 | COMMUNITY
Start: 2019-01-28 | End: 2021-11-18 | Stop reason: ALTCHOICE

## 2019-02-13 RX ORDER — FLUTICASONE PROPIONATE 44 UG/1
1 AEROSOL, METERED RESPIRATORY (INHALATION) 2 TIMES DAILY
Qty: 1 INHALER | Refills: 1 | Status: SHIPPED | OUTPATIENT
Start: 2019-02-13 | End: 2019-05-21 | Stop reason: ALTCHOICE
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
## 2019-02-13 NOTE — PROGRESS NOTES
RO[address] presents today for   Chief Complaint   Patient presents with    Numbness     Patient states numbness and tingling on left hand, foot and shoulder. [place] preferred language for health care discussion is english/other. Depression Screening:  3 most recent PHQ Screens 8/5/2016 6/9/2015   Little interest or pleasure in doing things Not at all Not at all   Feeling down, depressed, irritable, or hopeless Not at all Several days   Total Score PHQ 2 0 1       Learning Assessment:  Learning Assessment 12/3/2015 6/9/2015   PRIMARY LEARNER Patient Patient   HIGHEST LEVEL OF EDUCATION - PRIMARY LEARNER  - SOME COLLEGE   BARRIERS PRIMARY LEARNER - NONE   CO-LEARNER CAREGIVER - No   PRIMARY LANGUAGE ENGLISH ENGLISH   LEARNER PREFERENCE PRIMARY LISTENING DEMONSTRATION   ANSWERED BY patient patient   RELATIONSHIP SELF SELF       Abuse Screening:  Abuse Screening Questionnaire 6/9/2015   Do you ever feel afraid of your partner? N   Are you in a relationship with someone who physically or mentally threatens you? N   Is it safe for you to go home? Y       Fall Risk  No flowsheet data found. Visit Vitals  /68 (BP 1 Location: Left arm, BP Patient Position: Sitting)   Pulse 93   Temp 98.1 °F (36.7 °C) (Oral)   Resp 16   Ht 5' 5\" (1.651 m)   Wt 210 lb (95.3 kg)   SpO2 97%   BMI 34.95 kg/m²       Health Maintenance reviewed and discussed per provider. Yes    [place] Art is due for   Health Maintenance Due   Topic Date Due    PAP AKA CERVICAL CYTOLOGY  07/22/2018    Influenza Age 5 to Adult  08/01/2018     Please order/place referral if appropriate. Advance Directive:  1. Do you have an advance directive in place? Patient Reply: No    2. If not, would you like material regarding how to put one in place? Patient Reply: No    Coordination of Care:  1. Have you been to the ER, urgent care clinic since your last visit? Hospitalized since your last visit?  No

## 2019-02-17 PROBLEM — F31.9 BIPOLAR 1 DISORDER (HCC): Status: ACTIVE | Noted: 2018-05-10

## 2019-02-17 PROBLEM — D22.9 NUMEROUS MOLES: Status: ACTIVE | Noted: 2018-05-10

## 2019-02-17 PROBLEM — K58.0 IRRITABLE BOWEL SYNDROME WITH DIARRHEA: Status: ACTIVE | Noted: 2018-05-10

## 2019-02-17 PROBLEM — F32.81 PMDD (PREMENSTRUAL DYSPHORIC DISORDER): Status: ACTIVE | Noted: 2018-05-10

## 2019-02-17 PROBLEM — F41.9 ANXIETY: Status: ACTIVE | Noted: 2018-05-10

## 2019-02-17 PROBLEM — F17.200 SMOKER: Status: ACTIVE | Noted: 2018-05-10

## 2019-02-25 ENCOUNTER — TELEPHONE (OUTPATIENT)
Dept: PULMONOLOGY | Age: 39
End: 2019-02-25

## 2019-02-25 ENCOUNTER — HOSPITAL ENCOUNTER (EMERGENCY)
Age: 39
Discharge: HOME | End: 2019-02-25
Attending: EMERGENCY MEDICINE
Payer: MEDICAID

## 2019-02-25 ENCOUNTER — APPOINTMENT (OUTPATIENT)
Dept: GENERAL RADIOLOGY | Age: 39
End: 2019-02-25
Attending: EMERGENCY MEDICINE
Payer: MEDICAID

## 2019-02-25 ENCOUNTER — TELEPHONE (OUTPATIENT)
Dept: INTERNAL MEDICINE CLINIC | Age: 39
End: 2019-02-25

## 2019-02-25 VITALS
BODY MASS INDEX: 34.99 KG/M2 | HEART RATE: 89 BPM | OXYGEN SATURATION: 97 % | DIASTOLIC BLOOD PRESSURE: 77 MMHG | RESPIRATION RATE: 18 BRPM | SYSTOLIC BLOOD PRESSURE: 134 MMHG | WEIGHT: 210 LBS | TEMPERATURE: 98.7 F | HEIGHT: 65 IN

## 2019-02-25 DIAGNOSIS — F17.200 NICOTINE USE DISORDER: Primary | ICD-10-CM

## 2019-02-25 DIAGNOSIS — R53.83 FATIGUE, UNSPECIFIED TYPE: ICD-10-CM

## 2019-02-25 DIAGNOSIS — J20.9 ACUTE BRONCHITIS, UNSPECIFIED ORGANISM: ICD-10-CM

## 2019-02-25 LAB
ANION GAP SERPL CALC-SCNC: 7 MMOL/L (ref 3–18)
APPEARANCE UR: CLEAR
BASOPHILS # BLD: 0 K/UL (ref 0–0.1)
BASOPHILS NFR BLD: 0 % (ref 0–2)
BILIRUB UR QL: NEGATIVE
BUN SERPL-MCNC: 10 MG/DL (ref 7–18)
BUN/CREAT SERPL: 17 (ref 12–20)
CALCIUM SERPL-MCNC: 9.3 MG/DL (ref 8.5–10.1)
CHLORIDE SERPL-SCNC: 108 MMOL/L (ref 100–108)
CO2 SERPL-SCNC: 26 MMOL/L (ref 21–32)
COLOR UR: YELLOW
CREAT SERPL-MCNC: 0.58 MG/DL (ref 0.6–1.3)
DIFFERENTIAL METHOD BLD: ABNORMAL
EOSINOPHIL # BLD: 0.1 K/UL (ref 0–0.4)
EOSINOPHIL NFR BLD: 1 % (ref 0–5)
ERYTHROCYTE [DISTWIDTH] IN BLOOD BY AUTOMATED COUNT: 13.8 % (ref 11.6–14.5)
GLUCOSE SERPL-MCNC: 88 MG/DL (ref 74–99)
GLUCOSE UR STRIP.AUTO-MCNC: NEGATIVE MG/DL
HCG UR QL: NEGATIVE
HCT VFR BLD AUTO: 40 % (ref 35–45)
HGB BLD-MCNC: 12.9 G/DL (ref 12–16)
HGB UR QL STRIP: NEGATIVE
KETONES UR QL STRIP.AUTO: NEGATIVE MG/DL
LEUKOCYTE ESTERASE UR QL STRIP.AUTO: NEGATIVE
LYMPHOCYTES # BLD: 2.9 K/UL (ref 0.9–3.6)
LYMPHOCYTES NFR BLD: 28 % (ref 21–52)
MCH RBC QN AUTO: 29.5 PG (ref 24–34)
MCHC RBC AUTO-ENTMCNC: 32.3 G/DL (ref 31–37)
MCV RBC AUTO: 91.5 FL (ref 74–97)
MONOCYTES # BLD: 0.9 K/UL (ref 0.05–1.2)
MONOCYTES NFR BLD: 9 % (ref 3–10)
NEUTS SEG # BLD: 6.4 K/UL (ref 1.8–8)
NEUTS SEG NFR BLD: 62 % (ref 40–73)
NITRITE UR QL STRIP.AUTO: NEGATIVE
PH UR STRIP: 6.5 [PH] (ref 5–8)
PLATELET # BLD AUTO: 301 K/UL (ref 135–420)
PMV BLD AUTO: 8.9 FL (ref 9.2–11.8)
POTASSIUM SERPL-SCNC: 4.6 MMOL/L (ref 3.5–5.5)
PROT UR STRIP-MCNC: NEGATIVE MG/DL
RBC # BLD AUTO: 4.37 M/UL (ref 4.2–5.3)
SODIUM SERPL-SCNC: 141 MMOL/L (ref 136–145)
SP GR UR REFRACTOMETRY: 1.01 (ref 1–1.03)
TROPONIN I SERPL-MCNC: <0.02 NG/ML (ref 0–0.04)
UROBILINOGEN UR QL STRIP.AUTO: 0.2 EU/DL (ref 0.2–1)
WBC # BLD AUTO: 10.3 K/UL (ref 4.6–13.2)

## 2019-02-25 PROCEDURE — 81025 URINE PREGNANCY TEST: CPT

## 2019-02-25 PROCEDURE — 71045 X-RAY EXAM CHEST 1 VIEW: CPT

## 2019-02-25 PROCEDURE — 85025 COMPLETE CBC W/AUTO DIFF WBC: CPT

## 2019-02-25 PROCEDURE — 77030029684 HC NEB SM VOL KT MONA -A

## 2019-02-25 PROCEDURE — 96361 HYDRATE IV INFUSION ADD-ON: CPT

## 2019-02-25 PROCEDURE — 96374 THER/PROPH/DIAG INJ IV PUSH: CPT

## 2019-02-25 PROCEDURE — 94640 AIRWAY INHALATION TREATMENT: CPT

## 2019-02-25 PROCEDURE — 80048 BASIC METABOLIC PNL TOTAL CA: CPT

## 2019-02-25 PROCEDURE — 81003 URINALYSIS AUTO W/O SCOPE: CPT

## 2019-02-25 PROCEDURE — 74011250636 HC RX REV CODE- 250/636: Performed by: EMERGENCY MEDICINE

## 2019-02-25 PROCEDURE — 74011000250 HC RX REV CODE- 250: Performed by: EMERGENCY MEDICINE

## 2019-02-25 PROCEDURE — 84484 ASSAY OF TROPONIN QUANT: CPT

## 2019-02-25 PROCEDURE — 99283 EMERGENCY DEPT VISIT LOW MDM: CPT

## 2019-02-25 RX ORDER — IPRATROPIUM BROMIDE AND ALBUTEROL SULFATE 2.5; .5 MG/3ML; MG/3ML
3 SOLUTION RESPIRATORY (INHALATION)
Status: COMPLETED | OUTPATIENT
Start: 2019-02-25 | End: 2019-02-25

## 2019-02-25 RX ORDER — GUAIFENESIN 600 MG/1
600 TABLET, EXTENDED RELEASE ORAL 2 TIMES DAILY
Qty: 14 TAB | Refills: 0 | Status: SHIPPED | OUTPATIENT
Start: 2019-02-25 | End: 2019-03-04

## 2019-02-25 RX ORDER — PREDNISONE 20 MG/1
40 TABLET ORAL DAILY
Qty: 6 TAB | Refills: 0 | Status: SHIPPED | OUTPATIENT
Start: 2019-02-25 | End: 2019-02-28

## 2019-02-25 RX ORDER — IBUPROFEN 200 MG
1 TABLET ORAL EVERY 24 HOURS
Qty: 7 PATCH | Refills: 0 | Status: SHIPPED | OUTPATIENT
Start: 2019-02-25 | End: 2019-03-04

## 2019-02-25 RX ADMIN — METHYLPREDNISOLONE SODIUM SUCCINATE 125 MG: 125 INJECTION, POWDER, FOR SOLUTION INTRAMUSCULAR; INTRAVENOUS at 13:57

## 2019-02-25 RX ADMIN — IPRATROPIUM BROMIDE AND ALBUTEROL SULFATE 3 ML: .5; 3 SOLUTION RESPIRATORY (INHALATION) at 13:57

## 2019-02-25 RX ADMIN — SODIUM CHLORIDE 1000 ML: 900 INJECTION, SOLUTION INTRAVENOUS at 13:50

## 2019-02-25 NOTE — ED PROVIDER NOTES
EMERGENCY DEPARTMENT HISTORY AND PHYSICAL EXAM 
 
12:53 PM 
 
 
Date: 2/25/2019 Patient Name: Mitchell Mayo History of Presenting Illness Chief Complaint Patient presents with  Cough  Syncope History Provided By: Patient Additional History (Context): Mitchell Mayo is a 37 y.o. female with asthma and smoker (since age 13) who presents with s/p possible syncopal episode while at work just PTA associated acutely worsening chronic dry cough and fatigue. Just PTA at work the pt states she felt really tired and lay her head down because she couldn't \"keep [her] eyes open\". Then the pt woke up in a sweet to her phone ringing. The pt only had a donut for breakfast today. Smoker since age 13. Prior to syncopal-like peisode the pt reports feeling well, denies nausea, fever, or chills. Last period was in January but the pt was recently started birth control. The pt denies fever, chills, abdominal pain, vision changes, CP, SOB, palpiations, chest discomfort, and any other associated sxs. PCP: Mckenzie Saucedo NP Chief Complaint: syncopal like episode Duration:  just PTA Timing:  Acute Location: n/a Quality: n/a Severity: Mild Modifying Factors: limited food intake Associated Symptoms: dry cough Past History Past Medical History: 
Past Medical History:  
Diagnosis Date  Asthma  Bipolar 1 disorder ([address] Followed by [facility], depression and anxiety  Genital HSV  GERD (gastroesophageal reflux disease) 4/13/2017  IBS (irritable bowel syndrome)  Numerous moles 5/10/2018  Onychomycosis   
 was on lamisol for this 3 mos ago. Was on it for 6 mos.  PMDD (premenstrual dysphoric disorder) 5/10/2018  Psychiatric disorder  Reactive airway disease  Tinea pedis Past Surgical History: 
Past Surgical History:  
Procedure Laterality Date  HX COLONOSCOPY    
 age 22 for diarrhea  HX CYST REMOVAL    
 hand  HX GYN    
 HX ORTHOPAEDIC    
 right hand  HX OVARIAN CYST REMOVAL    
 right ovary Family History: 
Family History Problem Relation Age of On[facility] 22 [facility] Arthritis-osteo Mother  Migraines Mother  Stroke Mother  Alcohol abuse Father  Cancer Father  Hypertension Father  Asthma Sister  Diabetes Sister  Heart Disease Sister  Stroke Maternal Grandmother  Diabetes Maternal Grandfather  COPD Maternal Grandfather  No Known Problems Paternal Grandmother  No Known Problems Paternal Grandfather Social History: 
Social History Tobacco Use  Smoking status: Current Every Day Smoker Packs/day: 2.00 Years: 23.00 Pack years: 46.00 Types: Cigarettes Start date: 2/9/1992  Smokeless tobacco: Never Used Substance Use Topics  Alcohol use: Yes Alcohol/week: 1.2 oz Types: 2 Glasses of wine per week Comment: 2x week and on weekends: 1-6 drinks/sitting  Drug use: No  
 
 
Allergies: Allergies Allergen Reactions  Prozac [Fluoxetine] Unknown (comments) and Other (comments) Review of Systems Review of Systems Constitutional: Positive for diaphoresis and fatigue. Negative for chills and fever. HENT: Negative for congestion, rhinorrhea, sore throat and trouble swallowing. Eyes: Negative for visual disturbance. Respiratory: Positive for cough and wheezing. Negative for shortness of breath. Cardiovascular: Negative for chest pain, palpitations and leg swelling. Gastrointestinal: Negative for abdominal distention, abdominal pain, diarrhea, nausea and vomiting. Endocrine: Negative for polyuria. Genitourinary: Negative for dysuria. Musculoskeletal: Negative for arthralgias and neck stiffness. Skin: Negative for pallor and rash. Neurological: Positive for syncope. Negative for dizziness, weakness, numbness and headaches. Hematological: Does not bruise/bleed easily. Psychiatric/Behavioral: Negative for confusion and dysphoric mood. All other systems reviewed and are negative. Physical Exam  
 
Visit Vitals /77 (BP 1 Location: Left arm, BP Patient Position: At rest) Pulse 89 Temp 98.7 °F (37.1 °C) Resp 18 Ht 5' 5\" (1.651 m) Wt 95.3 kg (210 lb) LMP 01/19/2019 SpO2 97% BMI 34.95 kg/m² Physical Exam  
Constitutional: She is oriented to person, place, and time. She appears well-developed and well-nourished. No distress. HENT:  
Head: Normocephalic and atraumatic. Mouth/Throat: Uvula is midline and oropharynx is clear and moist.  
Eyes: Conjunctivae are normal. Pupils are equal, round, and reactive to light. No scleral icterus. Neck: Normal range of motion. Neck supple. Cardiovascular: Normal rate and intact distal pulses. Pulmonary/Chest: Effort normal and breath sounds normal. No respiratory distress. She has no wheezes. Active cough at bedside Abdominal: Soft. Bowel sounds are normal. She exhibits no distension. There is no tenderness. Musculoskeletal: Normal range of motion. She exhibits no edema. Lymphadenopathy:  
  She has no cervical adenopathy. Neurological: She is alert and oriented to person, place, and time. No cranial nerve deficit. Skin: Skin is warm and dry. She is not diaphoretic. Nursing note and vitals reviewed. Diagnostic Study Results Labs - Recent Results (from the past 12 hour(s)) CBC WITH AUTOMATED DIFF Collection Time: 02/25/19  1:25 PM  
Result Value Ref Range WBC 10.3 4.6 - 13.2 K/uL  
 RBC 4.37 4.20 - 5.30 M/uL  
 HGB 12.9 12.0 - 16.0 g/dL HCT 40.0 35.0 - 45.0 % MCV 91.5 74.0 - 97.0 FL  
 MCH 29.5 24.0 - 34.0 PG  
 MCHC 32.3 31.0 - 37.0 g/dL  
 RDW 13.8 11.6 - 14.5 % PLATELET 633 980 - 109 K/uL MPV 8.9 (L) 9.2 - 11.8 FL  
 NEUTROPHILS 62 40 - 73 % LYMPHOCYTES 28 21 - 52 % MONOCYTES 9 3 - 10 % EOSINOPHILS 1 0 - 5 % BASOPHILS 0 0 - 2 % ABS. NEUTROPHILS 6.4 1.8 - 8.0 K/UL  
 ABS. LYMPHOCYTES 2.9 0.9 - 3.6 K/UL  
 ABS. MONOCYTES 0.9 0.05 - 1.2 K/UL  
 ABS. EOSINOPHILS 0.1 0.0 - 0.4 K/UL  
 ABS. BASOPHILS 0.0 0.0 - 0.1 K/UL  
 DF AUTOMATED METABOLIC PANEL, BASIC Collection Time: 02/25/19  1:25 PM  
Result Value Ref Range Sodium 141 136 - 145 mmol/L Potassium 4.6 3.5 - 5.5 mmol/L Chloride 108 100 - 108 mmol/L  
 CO2 26 21 - 32 mmol/L Anion gap 7 3.0 - 18 mmol/L Glucose 88 74 - 99 mg/dL BUN 10 7.0 - 18 MG/DL Creatinine 0.58 (L) 0.6 - 1.3 MG/DL  
 BUN/Creatinine ratio 17 12 - 20 GFR est AA >60 >60 ml/min/1.73m2 GFR est non-AA >60 >60 ml/min/1.73m2 Calcium 9.3 8.5 - 10.1 MG/DL  
TROPONIN I Collection Time: 02/25/19  1:25 PM  
Result Value Ref Range Troponin-I, QT <0.02 0.0 - 0.045 NG/ML  
URINALYSIS W/ RFLX MICROSCOPIC Collection Time: 02/25/19  1:35 PM  
Result Value Ref Range Color YELLOW Appearance CLEAR Specific gravity 1.014 1.005 - 1.030    
 pH (UA) 6.5 5.0 - 8.0 Protein NEGATIVE  NEG mg/dL Glucose NEGATIVE  NEG mg/dL Ketone NEGATIVE  NEG mg/dL Bilirubin NEGATIVE  NEG Blood NEGATIVE  NEG Urobilinogen 0.2 0.2 - 1.0 EU/dL Nitrites NEGATIVE  NEG Leukocyte Esterase NEGATIVE  NEG    
HCG URINE, QL Collection Time: 02/25/19  1:35 PM  
Result Value Ref Range HCG urine, QL NEGATIVE  NEG Radiologic Studies -  
XR CHEST PORT Final Result Impression: 1. Bibasilar atelectasis. Medical Decision Making I am the first provider for this patient. I reviewed the vital signs, available nursing notes, past medical history, past surgical history, family history and social history. Vital Signs-Reviewed the patient's vital signs. Pulse Oximetry Analysis -  97% on room air (Interpretation)WNL Records Reviewed: Nursing Notes and Old Medical Records (Time of Review: 12:53 PM) Provider Notes (Medical Decision Making): MDM  
 
 DDx: COPD, asthma URI, smoker, metabolic, anemia, fatigue, admits didn't eat breakfast, only a donut, labs CXR, breahting treatment, steroids, IV fluids. Pt refuses EKG adamantly. Advised on smoking cessation. Medications  
sodium chloride 0.9 % bolus infusion 1,000 mL (1,000 mL IntraVENous New Bag 2/25/19 1350)  
albuterol-ipratropium (DUO-NEB) 2.5 MG-0.5 MG/3 ML (3 mL Nebulization Given 2/25/19 1357) methylPREDNISolone (PF) (Solu-MEDROL) injection 125 mg (125 mg IntraVENous Given 2/25/19 1357) ED Course: Progress Notes, Reevaluation, and Consults: WBC wnl Cr wnl The patient was counseled on the dangers of tobacco use, and was advised to quit. Reviewed strategies to maximize success, including removing cigarettes and smoking materials from environment, substitution of other forms of reinforcement, support of family/friends, written materials and pharmacotherapy (nicotine patches). I have reassessed the patient. I have discussed the workup, results and plan with the patient and patient is in agreement. Patient is feeling better. Patient will be prescribed prednisone, nicotine patch, gauifenesin. Patient was discharge in stable condition. Patient was given outpatient follow up. Patient is to return to emergency department if any new or worsening condition. 3:15 PM 
Pt feeling better and is serious about quitting, smoking and nicotine. Requesting nictoine patch. I stressed that she cannot use patch and smoke. Explained risks. States she fully understands and agrees. 1 week supply and call PCP for follow-up. Diagnosis Clinical Impression: 1. Nicotine use disorder 2. Acute bronchitis, unspecified organism 3. Fatigue, unspecified type Disposition: Discharge Follow-up Information Follow up With Specialties Details Why Contact Info  Randy Engel NP Internal Medicine Schedule an appointment as soon as possible for a visit in 2 days  [phone] [facility] 
[phone] [id number] [facility] EMERGENCY DEPT Emergency Medicine Go to If symptoms worsen, As needed [place] [id number] [phone] Medication List  
  
START taking these medications   
guaiFENesin  mg ER tablet Commonly known as:  Giovani & Giovani Take 1 Tab by mouth two (2) times a day for 7 days. nicotine 14 mg/24 hr patch Commonly known as:  NICODERM CQ 
1 Patch by TransDERmal route every twenty-four (24) hours for 7 days. predniSONE 20 mg tablet Commonly known as:  Thomas Stryker Take 40 mg by mouth daily for 3 days. Start this medication on Tuesday 2/26/19. Take With Breakfast 
  
  
ASK your doctor about these medications ADDERALL 20 mg tablet Generic drug:  dextroamphetamine-amphetamine 
  
albuterol 90 mcg/actuation inhaler Commonly known as:  PROVENTIL HFA, VENTOLIN HFA, PROAIR HFA Take 2 Puffs by inhalation every four (4) hours as needed for Wheezing. Indications: BRONCHOSPASM PREVENTION 
  
benzonatate 100 mg capsule Commonly known as:  TESSALON Take 1 Cap by mouth three (3) times daily as needed for Cough. Biotin 2,500 mcg Cap BUSPAR PO 
  
clonazePAM 0.5 mg tablet Commonly known as:  KlonoPIN 
  
cyclobenzaprine 10 mg tablet Commonly known as:  FLEXERIL Take 0.5 Tabs by mouth nightly. EFFEXOR PO 
  
fluticasone 44 mcg/actuation inhaler Commonly known as:  FLOVENT HFA Take 1 Puff by inhalation two (2) times a day. inhalational spacing device 1 Each by Does Not Apply route as needed. lamoTRIgine 100 mg tablet Commonly known as: LaMICtal 
  
medroxyPROGESTERone 150 mg/mL Syrg Commonly known as:  DEPO-PROVERA 
  
naproxen 500 mg tablet Commonly known as:  NAPROSYN Take 1 Tab by mouth two (2) times daily (with meals). prazosin 2 mg capsule Commonly known as:  MINIPRESS * SEROquel 50 mg tablet Generic drug:  QUEtiapine * SEROquel XR 50 mg sr tablet Generic drug:  QUEtiapine SR 
  
ziprasidone 20 mg capsule Commonly known as:  Ralph Cutlerino * This list has 2 medication(s) that are the same as other medications prescribed for you. Read the directions carefully, and ask your doctor or other care provider to review them with you. Where to Get Your Medications Information about where to get these medications is not yet available Ask your nurse or doctor about these medications · guaiFENesin  mg ER tablet · nicotine 14 mg/24 hr patch · predniSONE 20 mg tablet 
  
 
_______________________________ Attestations: 
Scribe Attestation Edenilson Cabrera DO acting as a scribe for and in the presence of Regi Abrams DO February 25, 2019 at 12:53 PM 
    
Provider Attestation:     
I personally performed the services described in the documentation, reviewed the documentation, as recorded by the scribe in my presence, and it accurately and completely records my words and actions. February 25, 2019 at 12:53 PM - Tyson Knight DO   
_______________________________

## 2019-02-25 NOTE — TELEPHONE ENCOUNTER
Pt states she is very sob, she is in ER getting a breathing treatment, but wanted to be seen today. Apt was made already for 3/4, but pt thinks she needs asap. She was last seen 2017.

## 2019-02-25 NOTE — ED TRIAGE NOTES
Patient states possible syncopal event today prior to coming to ER following coughing episode. She states that she has been advised that she has early copd symptoms. Patient states persistent chronic cough.

## 2019-02-25 NOTE — ED NOTES
I have reviewed discharge instructions with the patient. The patient verbalized understanding. Current Discharge Medication List  
  
START taking these medications Details  
predniSONE (DELTASONE) 20 mg tablet Take 40 mg by mouth daily for 3 days. Start this medication on Tuesday 2/26/19. Take With Breakfast 
Qty: 6 Tab, Refills: 0  
  
guaiFENesin ER (MUCINEX) 600 mg ER tablet Take 1 Tab by mouth two (2) times a day for 7 days. Qty: 14 Tab, Refills: 0  
  
nicotine (NICODERM CQ) 14 mg/24 hr patch 1 Patch by TransDERmal route every twenty-four (24) hours for 7 days. Qty: 7 Patch, Refills: 0 Patient armband removed and shredded

## 2019-02-25 NOTE — TELEPHONE ENCOUNTER
Juvencio Barrow on 02/13/18-PT feels bad- says she Needs to get on a steroid, tessalon pearls- she was given an inhaler when she was here and that has made things worse-  Coughing so much she can't breathe- lower back pain, chest pain, very fatigued-please advise

## 2019-02-25 NOTE — DISCHARGE INSTRUCTIONS
Patient Education        Bronchitis: Care Instructions  Your Care Instructions    Bronchitis is inflammation of the bronchial tubes, which carry air to the lungs. The tubes swell and produce mucus, or phlegm. The mucus and inflamed bronchial tubes make you cough. You may have trouble breathing. Most cases of bronchitis are caused by viruses like those that cause colds. Antibiotics usually do not help and they may be harmful. Bronchitis usually develops rapidly and lasts about 2 to 3 weeks in otherwise healthy people. Follow-up care is a key part of your treatment and safety. Be sure to make and go to all appointments, and call your doctor if you are having problems. It's also a good idea to know your test results and keep a list of the medicines you take. How can you care for yourself at home? · Take all medicines exactly as prescribed. Call your doctor if you think you are having a problem with your medicine. · Get some extra rest.  · Take an over-the-counter pain medicine, such as acetaminophen (Tylenol), ibuprofen (Advil, Motrin), or naproxen (Aleve) to reduce fever and relieve body aches. Read and follow all instructions on the label. · Do not take two or more pain medicines at the same time unless the doctor told you to. Many pain medicines have acetaminophen, which is Tylenol. Too much acetaminophen (Tylenol) can be harmful. · Take an over-the-counter cough medicine that contains dextromethorphan to help quiet a dry, hacking cough so that you can sleep. Avoid cough medicines that have more than one active ingredient. Read and follow all instructions on the label. · Breathe moist air from a humidifier, hot shower, or sink filled with hot water. The heat and moisture will thin mucus so you can cough it out. · Do not smoke. Smoking can make bronchitis worse. If you need help quitting, talk to your doctor about stop-smoking programs and medicines.  These can increase your chances of quitting for good.  When should you call for help? Call 911 anytime you think you may need emergency care. For example, call if:    · You have severe trouble breathing.    Call your doctor now or seek immediate medical care if:    · You have new or worse trouble breathing.     · You cough up dark brown or bloody mucus (sputum).     · You have a new or higher fever.     · You have a new rash.    Watch closely for changes in your health, and be sure to contact your doctor if:    · You cough more deeply or more often, especially if you notice more mucus or a change in the color of your mucus.     · You are not getting better as expected. Where can you learn more? Go to http://tommieschooxnicole.info/. Enter H333 in the search box to learn more about \"Bronchitis: Care Instructions. \"  Current as of: September 5, 2018  Content Version: 11.9  © 4800-5405 [email or web address]. Care instructions adapted under license by [employer] (which disclaims liability or warranty for this information). If you have questions about a medical condition or this instruction, always ask your healthcare professional. [address] any warranty or liability for your use of this information. Patient Education        Fatigue: Care Instructions  Your Care Instructions    Fatigue is a feeling of tiredness, exhaustion, or lack of energy. You may feel fatigue because of too much or not enough activity. It can also come from stress, lack of sleep, boredom, and poor diet. Many medical problems, such as viral infections, can cause fatigue. Emotional problems, especially depression, are often the cause of fatigue. Fatigue is most often a symptom of another problem. Treatment for fatigue depends on the cause. For example, if you have fatigue because you have a certain health problem, treating this problem also treats your fatigue. If depression or anxiety is the cause, treatment may help.   Follow-up care is a key part of your treatment and safety. Be sure to make and go to all appointments, and call your doctor if you are having problems. It's also a good idea to know your test results and keep a list of the medicines you take. How can you care for yourself at home? · Get regular exercise. But don't overdo it. Go back and forth between rest and exercise. · Get plenty of rest.  · Eat a healthy diet. Do not skip meals, especially breakfast.  · Reduce your use of caffeine, tobacco, and alcohol. Caffeine is most often found in coffee, tea, cola drinks, and chocolate. · Limit medicines that can cause fatigue. This includes tranquilizers and cold and allergy medicines. When should you call for help? Watch closely for changes in your health, and be sure to contact your doctor if:    · You have new symptoms such as fever or a rash.     · Your fatigue gets worse.     · You have been feeling down, depressed, or hopeless. Or you may have lost interest in things that you usually enjoy.     · You are not getting better as expected. Where can you learn more? Go to http://tommieNodalitynicole.info/. Enter D090 in the search box to learn more about \"Fatigue: Care Instructions. \"  Current as of: September 23, 2018  Content Version: 11.9  © 6998-7709 [employer]. Care instructions adapted under license by [employer] (which disclaims liability or warranty for this information). If you have questions about a medical condition or this instruction, always ask your healthcare professional. [place] [address] any warranty or liability for your use of this information. Patient Education        Stopping Smoking: Care Instructions  Your Care Instructions  Cigarette smokers crave the nicotine in cigarettes. Giving it up is much harder than simply changing a habit. Your body has to stop craving the nicotine. It is hard to quit, but you can do it.  There are many tools that people use to quit smoking. You may find that combining tools works best for you. There are several steps to quitting. First you get ready to quit. Then you get support to help you. After that, you learn new skills and behaviors to become a nonsmoker. For many people, a necessary step is getting and using medicine. Your doctor will help you set up the plan that best meets your needs. You may want to attend a smoking cessation program to help you quit smoking. When you choose a program, look for one that has proven success. Ask your doctor for ideas. You will greatly increase your chances of success if you take medicine as well as get counseling or join a cessation program.  Some of the changes you feel when you first quit tobacco are uncomfortable. Your body will miss the nicotine at first, and you may feel short-tempered and grumpy. You may have trouble sleeping or concentrating. Medicine can help you deal with these symptoms. You may struggle with changing your smoking habits and rituals. The last step is the tricky one: Be prepared for the smoking urge to continue for a time. This is a lot to deal with, but keep at it. You will feel better. Follow-up care is a key part of your treatment and safety. Be sure to make and go to all appointments, and call your doctor if you are having problems. It's also a good idea to know your test results and keep a list of the medicines you take. How can you care for yourself at home? · Ask your family, friends, and coworkers for support. You have a better chance of quitting if you have help and support. · Join a support group, such as Nicotine Anonymous, for people who are trying to quit smoking. · Consider signing up for a smoking cessation program, such as the American Lung Association's Freedom from Smoking program.  · Get text messaging support. Go to the website at www.smokefree. gov to sign up for the [facility] program.  · Set a quit date.  Pick your date carefully so that it is not right in the middle of a big deadline or stressful time. Once you quit, do not even take a puff. Get rid of all ashtrays and lighters after your last cigarette. Clean your house and your clothes so that they do not smell of smoke. · Learn how to be a nonsmoker. Think about ways you can avoid those things that make you reach for a cigarette. ? Avoid situations that put you at greatest risk for smoking. For some people, it is hard to have a drink with friends without smoking. For others, they might skip a coffee break with coworkers who smoke. ? Change your daily routine. Take a different route to work or eat a meal in a different place. · Cut down on stress. Calm yourself or release tension by doing an activity you enjoy, such as reading a book, taking a hot bath, or gardening. · Talk to your doctor or pharmacist about nicotine replacement therapy, which replaces the nicotine in your body. You still get nicotine but you do not use tobacco. Nicotine replacement products help you slowly reduce the amount of nicotine you need. These products come in several forms, many of them available over-the-counter:  ? Nicotine patches  ? Nicotine gum and lozenges  ? Nicotine inhaler  · Ask your doctor about bupropion (Wellbutrin) or varenicline (Chantix), which are prescription medicines. They do not contain nicotine. They help you by reducing withdrawal symptoms, such as stress and anxiety. · Some people find hypnosis, acupuncture, and massage helpful for ending the smoking habit. · Eat a healthy diet and get regular exercise. Having healthy habits will help your body move past its craving for nicotine. · Be prepared to keep trying. Most people are not successful the first few times they try to quit. Do not get mad at yourself if you smoke again. Make a list of things you learned and think about when you want to try again, such as next week, next month, or next year. Where can you learn more?   Go to http://tommie-nicole.info/. Enter F052 in the search box to learn more about \"Stopping Smoking: Care Instructions. \"  Current as of: September 26, 2018  Content Version: 11.9  © 8039-8227 [employer]. Care instructions adapted under license by [employer] (which disclaims liability or warranty for this information). If you have questions about a medical condition or this instruction, always ask your healthcare professional. [place] [address] any warranty or liability for your use of this information. Patient Education        Learning About Benefits From Quitting Smoking  How does quitting smoking make you healthier? If you're thinking about quitting smoking, you may have a few reasons to be smoke-free. Your health may be one of them. · When you quit smoking, you lower your risks for cancer, lung disease, heart attack, stroke, blood vessel disease, and blindness from macular degeneration. · When you're smoke-free, you get sick less often, and you heal faster. You are less likely to get colds, flu, bronchitis, and pneumonia. · As a nonsmoker, you may find that your mood is better and you are less stressed. When and how will you feel healthier? Quitting has real health benefits that start from day 1 of being smoke-free. And the longer you stay smoke-free, the healthier you get and the better you feel. The first hours  · After just 20 minutes, your blood pressure and heart rate go down. That means there's less stress on your heart and blood vessels. · Within 12 hours, the level of carbon monoxide in your blood drops back to normal. That makes room for more oxygen. With more oxygen in your body, you may notice that you have more energy than when you smoked. After 2 weeks  · Your lungs start to work better. · Your risk of heart attack starts to drop.   After 1 month  · When your lungs are clear, you cough less and breathe deeper, so it's easier to be active. · Your sense of taste and smell return. That means you can enjoy food more than you have since you started smoking. Over the years  · After 1 year, your risk of heart disease is half what it would be if you kept smoking. · After 5 years, your risk of stroke starts to shrink. Within a few years after that, it's about the same as if you'd never smoked. · After 10 years, your risk of dying from lung cancer is cut by about half. And your risk for many other types of cancer is lower too. How would quitting help others in your life? When you quit smoking, you improve the health of everyone who now breathes in your smoke. · Their heart, lung, and cancer risks drop, much like yours. · They are sick less. For babies and small children, living smoke-free means they're less likely to have ear infections, pneumonia, and bronchitis. · If you're a woman who is or will be pregnant someday, quitting smoking means a healthier . · Children who are close to you are less likely to become adult smokers. Where can you learn more? Go to http://tommie-nicole.info/. Enter [id number] in the search box to learn more about \"Learning About Benefits From Quitting Smoking. \"  Current as of: 2018  Content Version: 11.9  © 9515-7928 [employer]. Care instructions adapted under license by [employer] (which disclaims liability or warranty for this information). If you have questions about a medical condition or this instruction, always ask your healthcare professional. [place] [address] any warranty or liability for your use of this information.

## 2019-03-04 ENCOUNTER — OFFICE VISIT (OUTPATIENT)
Dept: PULMONOLOGY | Age: 39
End: 2019-03-04

## 2019-03-04 VITALS
DIASTOLIC BLOOD PRESSURE: 72 MMHG | OXYGEN SATURATION: 96 % | WEIGHT: 210 LBS | RESPIRATION RATE: 16 BRPM | HEART RATE: 94 BPM | TEMPERATURE: 98.2 F | HEIGHT: 65 IN | SYSTOLIC BLOOD PRESSURE: 130 MMHG | BODY MASS INDEX: 34.99 KG/M2

## 2019-03-04 DIAGNOSIS — J44.9 CHRONIC OBSTRUCTIVE PULMONARY DISEASE, UNSPECIFIED COPD TYPE (HCC): ICD-10-CM

## 2019-03-04 DIAGNOSIS — F17.200 NICOTINE DEPENDENCE WITH CURRENT USE: ICD-10-CM

## 2019-03-04 DIAGNOSIS — R05.9 COUGH: Primary | ICD-10-CM

## 2019-03-04 DIAGNOSIS — J45.909 ASTHMA, UNSPECIFIED ASTHMA SEVERITY, UNSPECIFIED WHETHER COMPLICATED, UNSPECIFIED WHETHER PERSISTENT: ICD-10-CM

## 2019-03-04 DIAGNOSIS — J42 CHRONIC BRONCHITIS, UNSPECIFIED CHRONIC BRONCHITIS TYPE (HCC): ICD-10-CM

## 2019-03-04 RX ORDER — BUDESONIDE AND FORMOTEROL FUMARATE DIHYDRATE 80; 4.5 UG/1; UG/1
2 AEROSOL RESPIRATORY (INHALATION) 2 TIMES DAILY
Qty: 1 INHALER | Refills: 0 | Status: SHIPPED | COMMUNITY
Start: 2019-03-04 | End: 2019-03-05 | Stop reason: ALTCHOICE

## 2019-03-04 RX ORDER — IPRATROPIUM BROMIDE AND ALBUTEROL SULFATE 2.5; .5 MG/3ML; MG/3ML
3 SOLUTION RESPIRATORY (INHALATION)
Qty: 30 NEBULE | Refills: 3 | Status: SHIPPED | OUTPATIENT
Start: 2019-03-04 | End: 2019-04-03

## 2019-03-04 NOTE — PATIENT INSTRUCTIONS
Start Pulmicort 2 puffs two times daily    Schedule pulmonary function testing     Continue Albuterol HFA PRN q 4-6 hours, OR albuterol nebulizer 4-6 hours PRN for wheezing, SOB  or cough    Increase PO fluids    Follow up in 2-3 weeks in pulmonology office or sooner with worsening of symptoms.      Go to the ER for difficulty breathing / chest pain    Smoking cessation strongly advised
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
## 2019-03-06 ENCOUNTER — TELEPHONE (OUTPATIENT)
Dept: PULMONOLOGY | Age: 39
End: 2019-03-06

## 2019-03-18 DIAGNOSIS — B00.9 HERPES SIMPLEX: ICD-10-CM

## 2019-03-18 RX ORDER — LIDOCAINE 40 MG/G
CREAM TOPICAL
Qty: 15 G | Refills: 0 | Status: SHIPPED | OUTPATIENT
Start: 2019-03-18 | End: 2019-06-24

## 2019-03-18 RX ORDER — LIDOCAINE 40 MG/G
CREAM TOPICAL
Qty: 15 G | Refills: 0 | Status: CANCELLED | OUTPATIENT
Start: 2019-03-18

## 2019-03-18 NOTE — TELEPHONE ENCOUNTER
I have called in the Lidocaine cream but please inform her the last time it was filled was through Gynecology 2015 with Dr. Oval Dandy and noted to be used with herpes simplex outbreaks- is this what she is needing if for?

## 2019-03-18 NOTE — TELEPHONE ENCOUNTER
Pt calling asking for refill of ladicaine? Says it is for the female area and gets refills from us. She isn't sure of any other name on the tube since she isn't at home. Pt says she has area that is extra skin and she uses it when it gets inflamed. [facility] on [address]    Wants call from nurse if there are any questions.

## 2019-03-29 ENCOUNTER — OFFICE VISIT (OUTPATIENT)
Dept: PULMONOLOGY | Age: 39
End: 2019-03-29

## 2019-03-29 VITALS
WEIGHT: 210 LBS | BODY MASS INDEX: 34.99 KG/M2 | SYSTOLIC BLOOD PRESSURE: 120 MMHG | OXYGEN SATURATION: 97 % | DIASTOLIC BLOOD PRESSURE: 60 MMHG | RESPIRATION RATE: 20 BRPM | TEMPERATURE: 99 F | HEART RATE: 100 BPM | HEIGHT: 65 IN

## 2019-03-29 DIAGNOSIS — R05.9 COUGH: ICD-10-CM

## 2019-03-29 DIAGNOSIS — J44.9 CHRONIC OBSTRUCTIVE PULMONARY DISEASE, UNSPECIFIED COPD TYPE (HCC): ICD-10-CM

## 2019-03-29 DIAGNOSIS — J45.909 ASTHMA, UNSPECIFIED ASTHMA SEVERITY, UNSPECIFIED WHETHER COMPLICATED, UNSPECIFIED WHETHER PERSISTENT: ICD-10-CM

## 2019-03-29 DIAGNOSIS — J45.909 UNCOMPLICATED ASTHMA, UNSPECIFIED ASTHMA SEVERITY, UNSPECIFIED WHETHER PERSISTENT: Primary | ICD-10-CM

## 2019-03-29 DIAGNOSIS — R06.00 DYSPNEA, UNSPECIFIED TYPE: ICD-10-CM

## 2019-03-29 NOTE — PROGRESS NOTES
Chief Complaint   Patient presents with    Asthma    COPD    Cough     1. Have you been to the ER, urgent care clinic since your last visit? Hospitalized since your last visit? No    2. Have you seen or consulted any other health care providers outside of the [address] [place] [address] since your last visit? Include any pap smears or colon screening.  No
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
## 2019-03-29 NOTE — PATIENT INSTRUCTIONS
Continue pulmicort, 2 puffs once a day every day.   Rinse mouth out after use    Use either Albuterol inhaler or duoneb treatment, every 4-6 hours as needed for cough or increased shortness of breath    Smoking cessation strongly advised    Return to pulmonary clinic in one month or sooner if symptoms worsen

## 2019-05-21 ENCOUNTER — OFFICE VISIT (OUTPATIENT)
Dept: INTERNAL MEDICINE CLINIC | Age: 39
End: 2019-05-21

## 2019-05-21 VITALS
BODY MASS INDEX: 34.82 KG/M2 | WEIGHT: 209 LBS | RESPIRATION RATE: 14 BRPM | HEART RATE: 91 BPM | OXYGEN SATURATION: 96 % | TEMPERATURE: 98.3 F | HEIGHT: 65 IN | SYSTOLIC BLOOD PRESSURE: 114 MMHG | DIASTOLIC BLOOD PRESSURE: 64 MMHG

## 2019-05-21 DIAGNOSIS — H66.90 ACUTE OTITIS MEDIA, UNSPECIFIED OTITIS MEDIA TYPE: ICD-10-CM

## 2019-05-21 DIAGNOSIS — J02.9 SORE THROAT: Primary | ICD-10-CM

## 2019-05-21 DIAGNOSIS — R05.9 COUGH: ICD-10-CM

## 2019-05-21 DIAGNOSIS — R09.81 NASAL CONGESTION: ICD-10-CM

## 2019-05-21 DIAGNOSIS — J45.31 MILD PERSISTENT ASTHMA WITH ACUTE EXACERBATION: ICD-10-CM

## 2019-05-21 RX ORDER — AMOXICILLIN AND CLAVULANATE POTASSIUM 875; 125 MG/1; MG/1
1 TABLET, FILM COATED ORAL 2 TIMES DAILY
Qty: 20 TAB | Refills: 0 | Status: SHIPPED | OUTPATIENT
Start: 2019-05-21 | End: 2019-05-31

## 2019-05-21 RX ORDER — BENZONATATE 100 MG/1
100 CAPSULE ORAL
Qty: 84 CAP | Refills: 0 | Status: SHIPPED | OUTPATIENT
Start: 2019-05-21 | End: 2019-06-07 | Stop reason: SDUPTHER

## 2019-05-21 NOTE — PATIENT INSTRUCTIONS
Symptomatic Treatment:     Make sure you are staying hydrated, 6-8 glasses of water daily, warm teas with honey to soothe a sore throat, throat lozenges, cool mist humidifier    Over the counter medications:     Nasal saline rinses followed by Flonase or Nasacort 1 puff to each side of the nose to be done daily prior to bedtime to help decreased nasal congestion and post nasal drip. Mucinex daily to help loosen chest and nasal congestion and needs to be taken daily with good hydration to provide relief. Antihistamines (Claritin, Zyrtec, Allegra) help dry up congestion and decrease watery or itchy eyes, ear fullness if related to noninfectious fluid behind the ear drum, and itchy ears. Sudafed- helps with congestion and cough. This medication does contain phenylephrine so be cautious while taking this medication if you have a diagnosis of elevated blood pressure. Tylenol, Naproxsyn, Aleve can be used to help relieve pain/tenderness/headaches/fever    Prescription medications:    Tessalon tablets take to help reduce cough. Tussionex is used to provide cough relief. It does contain an antihistamine and a narcotic called Hydrocodone. Please be advised that narcotics can cause sedation so should only be used when in a safe environment, no driving while taking this medication, and please keep out of reach of children.
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
## 2019-05-23 ENCOUNTER — TELEPHONE (OUTPATIENT)
Dept: INTERNAL MEDICINE CLINIC | Age: 39
End: 2019-05-23

## 2019-06-07 ENCOUNTER — OFFICE VISIT (OUTPATIENT)
Dept: INTERNAL MEDICINE CLINIC | Age: 39
End: 2019-06-07

## 2019-06-07 VITALS
RESPIRATION RATE: 14 BRPM | DIASTOLIC BLOOD PRESSURE: 52 MMHG | WEIGHT: 208 LBS | HEART RATE: 93 BPM | SYSTOLIC BLOOD PRESSURE: 119 MMHG | TEMPERATURE: 96.5 F | OXYGEN SATURATION: 97 % | HEIGHT: 65 IN | BODY MASS INDEX: 34.66 KG/M2

## 2019-06-07 DIAGNOSIS — H01.001 BLEPHARITIS OF RIGHT UPPER EYELID, UNSPECIFIED TYPE: ICD-10-CM

## 2019-06-07 DIAGNOSIS — R05.9 COUGH: ICD-10-CM

## 2019-06-07 DIAGNOSIS — M25.532 ACUTE PAIN OF LEFT WRIST: Primary | ICD-10-CM

## 2019-06-07 DIAGNOSIS — M62.830 MUSCLE SPASM OF BACK: ICD-10-CM

## 2019-06-07 RX ORDER — CYCLOBENZAPRINE HCL 10 MG
5 TABLET ORAL
Qty: 15 TAB | Refills: 0 | Status: SHIPPED | OUTPATIENT
Start: 2019-06-07 | End: 2019-06-24

## 2019-06-07 RX ORDER — BENZONATATE 100 MG/1
100 CAPSULE ORAL
Qty: 84 CAP | Refills: 0 | Status: SHIPPED | OUTPATIENT
Start: 2019-06-07 | End: 2019-08-27

## 2019-06-07 RX ORDER — NAPROXEN 500 MG/1
500 TABLET ORAL 2 TIMES DAILY WITH MEALS
Qty: 60 TAB | Refills: 0 | Status: SHIPPED | OUTPATIENT
Start: 2019-06-07 | End: 2019-06-24

## 2019-06-07 RX ORDER — BACITRACIN 500 [USP'U]/G
OINTMENT OPHTHALMIC
Qty: 1 G | Refills: 1 | Status: SHIPPED | OUTPATIENT
Start: 2019-06-07 | End: 2019-08-27

## 2019-06-07 RX ORDER — VARENICLINE TARTRATE 0.5 (11)-1
KIT ORAL
COMMUNITY
Start: 2019-05-31 | End: 2020-02-06

## 2019-06-07 NOTE — PROGRESS NOTES
Sachin Foley presents today for   Chief Complaint   Patient presents with   Barrera Sandoval              Depression Screening:  3 most recent PHQ Screens 5/21/2019   Little interest or pleasure in doing things Not at all   Feeling down, depressed, irritable, or hopeless Not at all   Total Score PHQ 2 0       Learning Assessment:  Learning Assessment 5/21/2019   PRIMARY LEARNER Patient   HIGHEST LEVEL OF EDUCATION - PRIMARY LEARNER  [id number] [address] PRIMARY LEARNER NONE   CO-LEARNER CAREGIVER No   PRIMARY LANGUAGE ENGLISH   LEARNER PREFERENCE PRIMARY DEMONSTRATION   ANSWERED BY patient   RELATIONSHIP SELF       Abuse Screening:  Abuse Screening Questionnaire 5/21/2019   Do you ever feel afraid of your partner? N   Are you in a relationship with someone who physically or mentally threatens you? N   Is it safe for you to go home? Y       Fall Risk  No flowsheet data found. Coordination of Care:  1. Have you been to the ER, urgent care clinic since your last visit? Hospitalized since your last visit? no    2. Have you seen or consulted any other health care providers outside of the [address] [place] [address] since your last visit? Include any pap smears or colon screening.  [facility] on file with [facility]
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
## 2019-07-31 ENCOUNTER — TELEPHONE (OUTPATIENT)
Dept: INTERNAL MEDICINE CLINIC | Age: 39
End: 2019-07-31

## 2019-07-31 NOTE — TELEPHONE ENCOUNTER
PT Needs different dermatologist- not [facility]- they told her the skin tags were just cosmetic and her insurance would not pay for them

## 2019-07-31 NOTE — TELEPHONE ENCOUNTER
Returned call to patient to advise that Lillie Finley is no longer with the practice and she would need to get a new pcp and she would need to call her insurance to see why they won't pay for the skin tags to get removed with [facility]. Patient stated that she was never contacted to be advised that Laila Colin is no longer with our practice. Patient wanted to know who in our office was accepting new patients. Advised patient Dr. Niurka Roberts. Patient declined to schedule. Patient wanted to know what other practices was accepting new patients. Advised patient [facility] practice and [facility] practice. Patient wanted us to schedule her with a new provider and a new dermatologist that her insurance would pay the skin tags to be removed. Advised patient that we do not schedule new patient appointment and that she would need to and I did not know of any dermatologist office that her insurance would cover skin tag removal and she would need to contact her insurance. Patient upset that this is a inconvenience for her to look into this.

## 2019-08-27 ENCOUNTER — HOSPITAL ENCOUNTER (EMERGENCY)
Age: 39
Discharge: HOME | End: 2019-08-27
Attending: EMERGENCY MEDICINE
Payer: MEDICAID

## 2019-08-27 VITALS
HEIGHT: 65 IN | WEIGHT: 210 LBS | DIASTOLIC BLOOD PRESSURE: 67 MMHG | SYSTOLIC BLOOD PRESSURE: 117 MMHG | OXYGEN SATURATION: 93 % | BODY MASS INDEX: 34.99 KG/M2 | RESPIRATION RATE: 16 BRPM | TEMPERATURE: 97.9 F | HEART RATE: 82 BPM

## 2019-08-27 DIAGNOSIS — H00.036 CELLULITIS OF LEFT EYELID: Primary | ICD-10-CM

## 2019-08-27 PROCEDURE — 99283 EMERGENCY DEPT VISIT LOW MDM: CPT

## 2019-08-27 RX ORDER — CEPHALEXIN 500 MG/1
500 CAPSULE ORAL 4 TIMES DAILY
Qty: 28 CAP | Refills: 0 | Status: SHIPPED | OUTPATIENT
Start: 2019-08-27 | End: 2019-09-03

## 2019-08-27 RX ORDER — SULFAMETHOXAZOLE AND TRIMETHOPRIM 800; 160 MG/1; MG/1
1 TABLET ORAL 2 TIMES DAILY
Qty: 14 TAB | Refills: 0 | Status: SHIPPED | OUTPATIENT
Start: 2019-08-27 | End: 2019-09-03

## 2019-08-27 NOTE — ED PROVIDER NOTES
EMERGENCY DEPARTMENT HISTORY AND PHYSICAL EXAM    12:03 PM      Date: 8/27/2019  Patient Name: Carlitos Hernadez    History of Presenting Illness     Chief Complaint   Patient presents with    Eye Problem       History Provided By: Patient    Additional History (Context): Carlitos Hernadez is a 37 y.o. female with hx of asthma, bipolar d/o who presents with c/o left upper eyelid swelling for 5 days. Patient notes associated pain. Denies fever or chills, eye drainage, changes in vision. Notes she had similar symptoms in the past and was diagnosed with a stye. Notes she has tried warm compresses without relief. PCP: None    Current Outpatient Medications   Medication Sig Dispense Refill    cephALEXin (KEFLEX) 500 mg capsule Take 1 Cap by mouth four (4) times daily for 7 days. 28 Cap 0    trimethoprim-sulfamethoxazole (BACTRIM DS) 160-800 mg per tablet Take 1 Tab by mouth two (2) times a day for 7 days. 14 Tab 0    oxybutynin chloride XL (DITROPAN XL) 10 mg CR tablet Take 1 Tab by mouth daily. 30 Tab 12    CHANTIX STARTING MONTH BOX 0.5 mg (11)- 1 mg (42) DsPk       prazosin (MINIPRESS) 2 mg capsule       medroxyPROGESTERone (DEPO-PROVERA) 150 mg/mL syrg INJECT 1 ML EVERY 3 MONTHS BY INTRAMUSCULAR ROUTE.  3    venlafaxine HCl (EFFEXOR PO) Take  by mouth.  ziprasidone (GEODON) 20 mg capsule 20 mg.      lamoTRIgine (LAMICTAL) 100 mg tablet 200 mg.  clonazePAM (KLONOPIN) 0.5 mg tablet Take 0.5 mg by mouth three (3) times daily as needed.  QUEtiapine SR (SEROQUEL XR) 50 mg sr tablet Take 50 mg by mouth daily.  QUEtiapine (SEROQUEL) 50 mg tablet Take 50 mg by mouth daily.  BUSPIRONE HCL (BUSPAR PO) Take 10 mg by mouth two (2) times a day.          Past History     Past Medical History:  Past Medical History:   Diagnosis Date    Asthma     Bipolar 1 disorder ([address]     Followed by [facility], depression and anxiety    Blepharitis     COPD (chronic obstructive pulmonary disease) ([address]     Genital herpes     Genital HSV     GERD (gastroesophageal reflux disease) 4/13/2017    IBS (irritable bowel syndrome)     Numerous moles 5/10/2018    OAB (overactive bladder)     Onychomycosis     was on lamisol for this 3 mos ago. Was on it for 6 mos.  PMDD (premenstrual dysphoric disorder) 5/10/2018    Psychiatric disorder     Reactive airway disease     Tinea pedis        Past Surgical History:  Past Surgical History:   Procedure Laterality Date    HX COLONOSCOPY      age 22 for diarrhea    HX CYST REMOVAL      hand    HX GYN      HX ORTHOPAEDIC      right hand    HX OVARIAN CYST REMOVAL      right ovary       Family History:  Family History   Problem Relation Age of Onset   [address] [facility] Arthritis-osteo Mother    [address] [facility] Migraines Mother     Stroke Mother     Alcohol abuse Father     Cancer Father     Hypertension Father     Asthma Sister     Diabetes Sister     Heart Disease Sister     Stroke Maternal Grandmother     Diabetes Maternal Grandfather     COPD Maternal Grandfather     No Known Problems Paternal Grandmother     No Known Problems Paternal Grandfather        Social History:  Social History     Tobacco Use    Smoking status: Former Smoker     Packs/day: 1.50     Years: 23.00     Pack years: 34.50     Types: Cigarettes     Start date: 2/9/1992    Smokeless tobacco: Former User     Quit date: 8/11/2019   Substance Use Topics    Alcohol use: Yes     Alcohol/week: 2.0 standard drinks     Types: 2 Glasses of wine per week     Comment: 2x week and on weekends: 1-6 drinks/sitting    Drug use: No     Types: Prescription, OTC       Allergies: Allergies   Allergen Reactions    Prozac [Fluoxetine] Unknown (comments) and Other (comments)         Review of Systems       Review of Systems   Constitutional: Negative for chills and fever. Eyes: Positive for pain. Negative for photophobia, discharge, redness and visual disturbance.    Respiratory: Negative for shortness of breath. Cardiovascular: Negative for chest pain. Gastrointestinal: Negative for abdominal pain, nausea and vomiting. Skin: Positive for rash. Neurological: Negative for weakness. All other systems reviewed and are negative. Physical Exam     Visit Vitals  /67 (BP 1 Location: Left arm, BP Patient Position: At rest)   Pulse 82   Temp 97.9 °F (36.6 °C)   Resp 16   Ht 5' 5\" (1.651 m)   Wt 95.3 kg (210 lb)   SpO2 93%   BMI 34.95 kg/m²         Physical Exam   Constitutional: She appears well-developed and well-nourished. No distress. HENT:   Head: Normocephalic and atraumatic. Eyes: Pupils are equal, round, and reactive to light. Conjunctivae and EOM are normal. Right eye exhibits no chemosis, no discharge, no exudate and no hordeolum. No foreign body present in the right eye. Left eye exhibits hordeolum (small stye L upper eyelid with mild surrounding erythema). Left eye exhibits no chemosis, no discharge and no exudate. No foreign body present in the left eye. No scleral icterus. No pain with EOM   Neck: Normal range of motion. Neck supple. Cardiovascular: Normal rate, regular rhythm, normal heart sounds and intact distal pulses. Exam reveals no gallop and no friction rub. No murmur heard. Pulmonary/Chest: Effort normal and breath sounds normal. No stridor. No respiratory distress. She has no wheezes. She has no rales. Musculoskeletal: Normal range of motion. Lymphadenopathy:     She has no cervical adenopathy. Neurological: She is alert. Skin: Skin is warm. No rash noted. She is not diaphoretic. Nursing note and vitals reviewed. Diagnostic Study Results     Labs -  No results found for this or any previous visit (from the past 12 hour(s)). Radiologic Studies -   No orders to display         Medical Decision Making   I am the first provider for this patient.     I reviewed the vital signs, available nursing notes, past medical history, past surgical history, family history and social history. Vital Signs-Reviewed the patient's vital signs. Records Reviewed: Nursing Notes and Old Medical Records (Time of Review: 12:03 PM)    ED Course: Progress Notes, Reevaluation, and Consults:  12:03 PM  Reviewed plan with patient. Discussed need for close outpatient follow-up this week for reassessment. Discussed strict return precautions, including fever, increased redness, eye pain, or any other medical concerns. Provider Notes (Medical Decision Making): 36 yo F who presents due to L upper eyelid erythema and edema x days. No changes in vision, eye pain, discharge, conjunctival injection, or pain with EOM. S/s consistent with stye with possible early cellulitis. No evidence of orbital cellulitis. Discussed importance of warm compresses. Will prescribe oral abx and recommend close outpatient follow-up with PMD within 48 hours for reassessment. Diagnosis     Clinical Impression:   1. Cellulitis of left eyelid        Disposition: home     Follow-up Information     Follow up With Specialties Details [facility] [place] [id number] EMERGENCY DEPT Emergency Medicine  If symptoms worsen [id number] [facility]    Gust Ormond, NP Internal Medicine In 2 days  [address] [facility]  [phone]      [facility]  Schedule an appointment as soon as possible for a visit  [address] [id number] [phone]           Discharge Medication List as of 8/27/2019 12:18 PM      START taking these medications    Details   cephALEXin (KEFLEX) 500 mg capsule Take 1 Cap by mouth four (4) times daily for 7 days. , Normal, Disp-28 Cap, R-0      trimethoprim-sulfamethoxazole (BACTRIM DS) 160-800 mg per tablet Take 1 Tab by mouth two (2) times a day for 7 days. , Normal, Disp-14 Tab, R-0         CONTINUE these medications which have NOT CHANGED    Details   oxybutynin chloride XL (DITROPAN XL) 10 mg CR tablet Take 1 Tab by mouth daily. , Normal, Disp-30 Tab, R-12      CHANTIX STARTING MONTH BOX 0.5 mg (11)- 1 mg (42) DsPk Historical Med, SARAH      prazosin (MINIPRESS) 2 mg capsule Historical Med      medroxyPROGESTERone (DEPO-PROVERA) 150 mg/mL syrg INJECT 1 ML EVERY 3 MONTHS BY INTRAMUSCULAR ROUTE., Historical Med, R-3      venlafaxine HCl (EFFEXOR PO) Take  by mouth., Historical Med      ziprasidone (GEODON) 20 mg capsule 20 mg., Historical Med      lamoTRIgine (LAMICTAL) 100 mg tablet 200 mg., Historical Med      clonazePAM (KLONOPIN) 0.5 mg tablet Take 0.5 mg by mouth three (3) times daily as needed., Historical Med      QUEtiapine SR (SEROQUEL XR) 50 mg sr tablet Take 50 mg by mouth daily. , Historical Med      QUEtiapine (SEROQUEL) 50 mg tablet Take 50 mg by mouth daily. , Historical Med      BUSPIRONE HCL (BUSPAR PO) Take 10 mg by mouth two (2) times a day., Historical Med             Dictation disclaimer:  Please note that this dictation was completed with [employer], the [employer] voice recognition software. Quite often unanticipated grammatical, syntax, homophones, and other interpretive errors are inadvertently transcribed by the computer software. Please disregard these errors. Please excuse any errors that have escaped final proofreading.

## 2019-08-27 NOTE — ED TRIAGE NOTES
Patient c/o itching and pain to left eye since Sunday. Swelling noted along upper eyelid. Patient states difficulty seeing out of eye.

## 2019-08-27 NOTE — DISCHARGE INSTRUCTIONS
Patient Education      Take medication as prescribed. Follow-up with your primary care physician within 2 days for reassessment. Bring the results from this visit with you for their review. Return to the ED immediately for any new, worsening, or persistent symptoms, including fever, increased redness/swelling, or any other medical concerns. Cellulitis of the Eye: Care Instructions  Your Care Instructions    Cellulitis of the eye is an infection of the skin and tissues around the eye. It is also called preseptal cellulitis or periorbital cellulitis. It is usually caused by bacteria. This type of infection may happen after a sinus infection or a dental infection. It could also happen after an insect bite or an injury to the face. It most often occurs where there is a break in the skin. Cellulitis of the eye can be very serious. It's important to treat it right away. If you do, it usually goes away without lasting problems. Medicine and home treatment can help you get better. Follow-up care is a key part of your treatment and safety. Be sure to make and go to all appointments, and call your doctor if you are having problems. It's also a good idea to know your test results and keep a list of the medicines you take. How can you care for yourself at home? · Take your antibiotics as directed. Do not stop taking them just because you feel better. You need to take the full course of antibiotics. · Do not wear contact lenses unless your doctor tells you it is okay. · Put your head on pillows, and put a cool, damp cloth on your eye. This can reduce swelling and pain. · If your doctor recommends it, use a warm pack on your eye. · Keep the skin around your eye clean and dry. · Be safe with medicines. Read and follow all instructions on the label. ? If the doctor gave you a prescription medicine for pain, take it as prescribed.   ? If you are not taking a prescription pain medicine, ask your doctor if you can take an over-the-counter medicine. To prevent cellulitis  · Wash your hands well after you use the bathroom and before and after you eat. · Do not rub or pick at the skin around your eyes. Cellulitis occurs most often where there is a break in the skin. · If you get a cut, pimple, or insect bite near your eye, clean the area as soon as you can. This can help prevent an infection. ? Wash the area with cool water and a mild soap, such as [place]. ? Do not use rubbing alcohol, hydrogen peroxide, iodine, or Mercurochrome. These can harm the tissues and slow healing. · Call your doctor if you have a sinus infection with redness or swelling of your eyes. When should you call for help? Call your doctor now or seek immediate medical care if:    · You have new or worse signs of an eye infection, such as:  ? Pus or thick discharge coming from the eye.  ? Redness or swelling around the eye.  ? A fever.     · Your eye seems to be bulging out.     · You seem to be getting sicker.     · You have vision changes.    Watch closely for changes in your health, and be sure to contact your doctor if:    · You do not get better as expected. Where can you learn more? Go to http://tommie-nicole.info/. Enter 771 [id number] in the search box to learn more about \"Cellulitis of the Eye: Care Instructions. \"  Current as of: July 17, 2018  Content Version: 12.1  © 7684-3371 [employer]. Care instructions adapted under license by [employer] (which disclaims liability or warranty for this information). If you have questions about a medical condition or this instruction, always ask your healthcare professional. [place] [address] any warranty or liability for your use of this information. Patient Education        Styes and Chalazia: Care Instructions  Your Care Instructions    Styes and chalazia (say \"dlz-PUZ-kye-uh\") are both conditions that can cause swelling of the eyelid.   A stye is an infection in the root of an eyelash. The infection causes a tender red lump on the edge of the eyelid. The infection can spread until the whole eyelid becomes red and inflamed. Styes usually break open, and a tiny amount of pus drains. They usually clear up on their own in about a week, but they sometimes need treatment with antibiotics. A chalazion is a lump or cyst in the eyelid (chalazion is singular; chalazia is plural). It is caused by swelling and inflammation of deep oil glands inside the eyelid. Chalazia are usually not infected. They can take a few months to heal.  If a chalazion becomes more swollen and painful or does not go away, you may need to have it drained by your doctor. Follow-up care is a key part of your treatment and safety. Be sure to make and go to all appointments, and call your doctor if you are having problems. It's also a good idea to know your test results and keep a list of the medicines you take. How can you care for yourself at home? · Do not rub your eyes. Do not squeeze or try to open a stye or chalazion. · To help a stye or chalazion heal faster:  ? Put a warm, moist compress on your eye for 5 to 10 minutes, 3 to 6 times a day. Heat often brings a stye to a point where it drains on its own. Keep in mind that warm compresses will often increase swelling a little at first.  ? Do not use hot water or heat a wet cloth in a microwave oven. The compress may get too hot and can burn the eyelid. · Always wash your hands before and after you use a compress or touch your eyes. · If the doctor gave you antibiotic drops or ointment, use the medicine exactly as directed. Use the medicine for as long as instructed, even if your eye starts to feel better. · To put in eyedrops or ointment:  ? Tilt your head back, and pull your lower eyelid down with one finger. ? Drop or squirt the medicine inside the lower lid.   ? Close your eye for 30 to 60 seconds to let the drops or ointment move around. ? Do not touch the ointment or dropper tip to your eyelashes or any other surface. · Do not wear eye makeup or contact lenses until the stye or chalazion heals. · Do not share towels, pillows, or washcloths while you have a stye. When should you call for help? Call your doctor now or seek immediate medical care if:    · You have pain in your eye.     · You have a change in vision or loss of vision.     · Redness and swelling get much worse.    Watch closely for changes in your health, and be sure to contact your doctor if:    · Your stye does not get better in 1 week.     · Your chalazion does not start to get better after several weeks. Where can you learn more? Go to http://tommie-nicole.info/. Enter O198 in the search box to learn more about \"Styes and Chalazia: Care Instructions. \"  Current as of: July 17, 2018  Content Version: 12.1  © 6056-1890 [employer]. Care instructions adapted under license by [employer] (which disclaims liability or warranty for this information). If you have questions about a medical condition or this instruction, always ask your healthcare professional. [address] any warranty or liability for your use of this information.

## 2019-10-04 ENCOUNTER — OFFICE VISIT (OUTPATIENT)
Dept: FAMILY MEDICINE CLINIC | Age: 39
End: 2019-10-04

## 2019-10-04 VITALS
TEMPERATURE: 98.1 F | HEART RATE: 81 BPM | WEIGHT: 209.8 LBS | BODY MASS INDEX: 34.95 KG/M2 | DIASTOLIC BLOOD PRESSURE: 66 MMHG | HEIGHT: 65 IN | RESPIRATION RATE: 16 BRPM | OXYGEN SATURATION: 96 % | SYSTOLIC BLOOD PRESSURE: 98 MMHG

## 2019-10-04 DIAGNOSIS — Z13.1 SCREENING FOR DIABETES MELLITUS: ICD-10-CM

## 2019-10-04 DIAGNOSIS — Z13.220 SCREENING FOR HYPERLIPIDEMIA: ICD-10-CM

## 2019-10-04 DIAGNOSIS — F41.9 ANXIETY DISORDER, UNSPECIFIED TYPE: ICD-10-CM

## 2019-10-04 DIAGNOSIS — Z13.0 SCREENING FOR DEFICIENCY ANEMIA: ICD-10-CM

## 2019-10-04 DIAGNOSIS — Z87.42: ICD-10-CM

## 2019-10-04 DIAGNOSIS — F31.9 BIPOLAR AFFECTIVE DISORDER, REMISSION STATUS UNSPECIFIED (HCC): ICD-10-CM

## 2019-10-04 DIAGNOSIS — F41.9 ANXIETY DISORDER, UNSPECIFIED TYPE: Primary | ICD-10-CM

## 2019-10-04 DIAGNOSIS — Z12.39 SCREENING FOR BREAST CANCER: ICD-10-CM

## 2019-10-04 DIAGNOSIS — Z98.890 H/O OVARIAN CYSTECTOMY: ICD-10-CM

## 2019-10-04 DIAGNOSIS — Z23 ENCOUNTER FOR IMMUNIZATION: ICD-10-CM

## 2019-10-04 DIAGNOSIS — Z87.42 H/O OVARIAN CYSTECTOMY: ICD-10-CM

## 2019-10-04 DIAGNOSIS — F10.10 ALCOHOL ABUSE: ICD-10-CM

## 2019-10-04 DIAGNOSIS — Z87.891 QUIT SMOKING: ICD-10-CM

## 2019-10-04 RX ORDER — BUSPIRONE HYDROCHLORIDE 10 MG/1
50 TABLET ORAL DAILY
COMMUNITY

## 2019-10-04 RX ORDER — TRETINOIN 0.25 MG/G
CREAM TOPICAL
COMMUNITY
Start: 2019-09-16 | End: 2020-08-11

## 2019-10-04 RX ORDER — LIDOCAINE 40 MG/G
CREAM TOPICAL
COMMUNITY
Start: 2019-03-18 | End: 2020-02-06

## 2019-10-04 RX ORDER — VENLAFAXINE HYDROCHLORIDE 150 MG/1
1 CAPSULE, EXTENDED RELEASE ORAL DAILY
COMMUNITY

## 2019-10-04 NOTE — PROGRESS NOTES
Flulaval 0.5 ml given IM in left deltoid. Lot # [id number], exp date 06/25/2020. Patient tolerated injection well. No adverse reaction noted.

## 2019-10-04 NOTE — PATIENT INSTRUCTIONS
Vaccine Information Statement    Influenza (Flu) Vaccine (Inactivated or Recombinant): What You Need to Know    Many Vaccine Information Statements are available in Croatian and other languages. See www.immunize.org/vis  Hojas de información sobre vacunas están disponibles en español y en muchos otros idiomas. Visite www.immunize.org/vis    1. Why get vaccinated? Influenza vaccine can prevent influenza (flu). Flu is a contagious disease that spreads around the [facility] every year, usually between October and May. Anyone can get the flu, but it is more dangerous for some people. Infants and young children, people 72years of age and older, pregnant women, and people with certain health conditions or a weakened immune system are at greatest risk of flu complications. Pneumonia, bronchitis, sinus infections and ear infections are examples of flu-related complications. If you have a medical condition, such as heart disease, cancer or diabetes, flu can make it worse. Flu can cause fever and chills, sore throat, muscle aches, fatigue, cough, headache, and runny or stuffy nose. Some people may have vomiting and diarrhea, though this is more common in children than adults. Each year thousands of people in the [facility] die from flu, and many more are hospitalized. Flu vaccine prevents millions of illnesses and flu-related visits to the doctor each year. 2. Influenza vaccines     CDC recommends everyone 10months of age and older get vaccinated every flu season. Children 6 months through 6years of age may need 2 doses during a single flu season. Everyone else needs only 1 dose each flu season. It takes about 2 weeks for protection to develop after vaccination. There are many flu viruses, and they are always changing. Each year a new flu vaccine is made to protect against three or four viruses that are likely to cause disease in the upcoming flu season.  Even when the vaccine doesnt exactly match these viruses, it may still provide some protection. Influenza vaccine does not cause flu. Influenza vaccine may be given at the same time as other vaccines. 3. Talk with your health care provider    Tell your vaccine provider if the person getting the vaccine:   Has had an allergic reaction after a previous dose of influenza vaccine, or has any severe, life-threatening allergies.  Has ever had Guillain-Barré Syndrome (also called GBS). In some cases, your health care provider may decide to postpone influenza vaccination to a future visit. People with minor illnesses, such as a cold, may be vaccinated. People who are moderately or severely ill should usually wait until they recover before getting influenza vaccine. Your health care provider can give you more information. 4. Risks of a reaction     Soreness, redness, and swelling where shot is given, fever, muscle aches, and headache can happen after influenza vaccine.  There may be a very small increased risk of Guillain-Barré Syndrome (GBS) after inactivated influenza vaccine (the flu shot). Steven Current children who get the flu shot along with pneumococcal vaccine (PCV13), and/or DTaP vaccine at the same time might be slightly more likely to have a seizure caused by fever. Tell your health care provider if a child who is getting flu vaccine has ever had a seizure. People sometimes faint after medical procedures, including vaccination. Tell your provider if you feel dizzy or have vision changes or ringing in the ears. As with any medicine, there is a very remote chance of a vaccine causing a severe allergic reaction, other serious injury, or death. 5. What if there is a serious problem? An allergic reaction could occur after the vaccinated person leaves the clinic.  If you see signs of a severe allergic reaction (hives, swelling of the face and throat, difficulty breathing, a fast heartbeat, dizziness, or weakness), call 9-1-1 and get the person to the nearest hospital.    For other signs that concern you, call your health care provider. Adverse reactions should be reported to the Vaccine Adverse Event Reporting System (VAERS). Your health care provider will usually file this report, or you can do it yourself. Visit the VAERS website at www.vaers. [facility].gov or call 4-758.432.5211. VAERS is only for reporting reactions, and VAERS staff do not give medical advice. 6. The National Vaccine Injury Compensation Program    The [facility] Vaccine Injury Compensation Program (VICP) is a federal program that was created to compensate people who may have been injured by certain vaccines. Visit the VICP website at www.hrsa.gov/vaccinecompensation or call 3-749.937.3594 to learn about the program and about filing a claim. There is a time limit to file a claim for compensation. 7. How can I learn more?  Ask your health care provider.  Call your local or state health department.  Contact the Centers for Disease Control and Prevention (CDC):  - Call 9-625.193.2191 (1-800-CDC-INFO) or  - Visit CDCs influenza website at www.cdc.gov/flu    Vaccine Information Statement (Interim)  Inactivated Influenza Vaccine   8/15/2019  42 KEERTHI. Caroline Courts 164BF-[id number]   Department of Health and Human Services  Centers for Disease Control and Prevention    Office Use Only

## 2019-10-04 NOTE — PROGRESS NOTES
HISTORY OF PRESENT ILLNESS  Kallie May is a 37 y.o. female. HPI: New patient to get establish care. H/o anxiety disorder and bipolar disorder. Following psychiatrist at . [facility]. Pending records. Been on medication and shows compliance. Per her no concern with current dose of medication and her mood has been stable. Due to some miscommunication with the pharmacy she did not get her medication for 3 days and she felt some discomfort but now back on her routine regimen and no concern. On depo as had bad premenstrual symptoms. Following ob/gyn. Up to date with pap. Will obtain records. No concern. H/o alcohol use. Occasional. Discussed to quit completely and she is working on it as it can make her mood disorder worse and Contraindicated with current medication. Visit Vitals  BP 98/66 (BP 1 Location: Left arm, BP Patient Position: Sitting)   Pulse 81   Temp 98.1 °F (36.7 °C) (Oral)   Resp 16   Ht 5' 5\" (1.651 m)   Wt 209 lb 12.8 oz (95.2 kg)   SpO2 96%   BMI 34.91 kg/m²     Review medication list, vitals, problem list,allergies. Lab Results   Component Value Date/Time    WBC 10.3 02/25/2019 01:25 PM    HGB 12.9 02/25/2019 01:25 PM    HCT 40.0 02/25/2019 01:25 PM    PLATELET 330 46/89/2104 01:25 PM    MCV 91.5 02/25/2019 01:25 PM     Lab Results   Component Value Date/Time    Sodium 141 02/25/2019 01:25 PM    Potassium 4.6 02/25/2019 01:25 PM    Chloride 108 02/25/2019 01:25 PM    CO2 26 02/25/2019 01:25 PM    Anion gap 7 02/25/2019 01:25 PM    Glucose 88 02/25/2019 01:25 PM    BUN 10 02/25/2019 01:25 PM    Creatinine 0.58 (L) 02/25/2019 01:25 PM    BUN/Creatinine ratio 17 02/25/2019 01:25 PM    GFR est AA >60 02/25/2019 01:25 PM    GFR est non-AA >60 02/25/2019 01:25 PM    Calcium 9.3 02/25/2019 01:25 PM    Bilirubin, total 0.5 09/13/2016 03:20 PM    AST (SGOT) 17 09/13/2016 03:20 PM    Alk.  phosphatase 108 09/13/2016 03:20 PM    Protein, total 7.5 09/13/2016 03:20 PM    Albumin 3.9 09/13/2016 03:20 PM    Globulin 3.6 09/13/2016 03:20 PM    A-G Ratio 1.1 09/13/2016 03:20 PM    ALT (SGPT) 28 09/13/2016 03:20 PM     Lab Results   Component Value Date/Time    Cholesterol, total 187 07/07/2015 04:26 PM    HDL Cholesterol 35 (L) 07/07/2015 04:26 PM    LDL, calculated 112 (H) 07/07/2015 04:26 PM    VLDL, calculated 40 07/07/2015 04:26 PM    Triglyceride 198 (H) 07/07/2015 04:26 PM    CHOL/HDL Ratio 5.8 (H) 10/18/2012 01:30 PM     Lab Results   Component Value Date/Time    TSH 0.96 02/11/2016 03:43 PM     Lab Results   Component Value Date/Time    Hemoglobin A1c 5.4 10/18/2012 01:30 PM     Lab Results   Component Value Date/Time    Vitamin D 25-Hydroxy 20.4 (L) 10/18/2012 01:30 PM    VITAMIN D, 25-HYDROXY 39.3 07/07/2015 04:26 PM       ROS: Denies any headache, dizziness, no chest pain or trouble breathing, no arm or leg weakness. No nausea or vomiting, no weight or appetite changes, no mood changes . No urine or bowel complains, no palpitation, no diaphoresis. No abdominal pain. No cold or cough. No leg swelling. No fever. No sleep trouble. Physical Exam   Constitutional: She is oriented to person, place, and time. No distress. Neck: No thyromegaly present. Cardiovascular: Normal rate, regular rhythm and normal heart sounds. Pulmonary/Chest:   CTA   Abdominal: Soft. Bowel sounds are normal. There is no tenderness. Musculoskeletal: She exhibits no edema. Lymphadenopathy:     She has no cervical adenopathy. Neurological: She is oriented to person, place, and time. Psychiatric: Thought content normal.       ASSESSMENT and PLAN    ICD-10-CM ICD-9-CM    1. Anxiety disorder, unspecified type: stable. Following psychiatrist recommendations. Checking labs. I98.2 460.38 METABOLIC PANEL, COMPREHENSIVE      TSH 3RD GENERATION   2. Bipolar affective disorder, remission status unspecified [facility] stable on current regimen. Following specialist recommendations.   F31.9 296.80 TSH 3RD GENERATION    [facility]    3. Encounter for immunization Z23 V03.89 INFLUENZA VIRUS VAC QUAD,SPLIT,PRESV FREE SYRINGE IM   4. Screening for breast cancer Z12.39 V76.10 SHREYA MAMMO BI SCREENING INCL CAD   5. H/O ovarian cystectomy Z98.890 V15.29     Z87.42      following ob/gyn. had pap done this year. 6. Screening for diabetes mellitus Z13.1 V77.1 HEMOGLOBIN A1C WITH EAG   7. Screening for hyperlipidemia Z13.220 V77.91 LIPID PANEL   8. Screening for deficiency anemia Z13.0 V78.1 CBC W/O DIFF   9. H/O premenstrual syndrome Z87.42 V13.29     on depo. following ob/gyn    10. Alcohol abuse: she is working on quit completely. F10.10 305.00    11. Quit smoking Z87. 891 V15.82     quit in oct. Pt understood and agree with the plan   Review    Follow-up and Dispositions    · Return in about 3 months (around 1/4/2020).

## 2019-10-04 NOTE — PROGRESS NOTES
1. Have you been to the ER, urgent care clinic since your last visit? Hospitalized since your last visit? [facility] 8/27/19 and [facility] 8/2019 for stye in eye    2. Have you seen or consulted any other health care providers outside of the [address] [place] [address] since your last visit? Include any pap smears or colon screening. Psychiatrist Dr. Alhaji Bond LOV: one month ago and [facility] LOV: two weeks ago - took off two moles. [address] - Patient unsure of physician or group name- sees them to get her Depo injection (Dr. Maciej Cool) LOV: one month ago. Chief Complaint   Patient presents with    New Patient     Last pap smear - Dr. Maciej Cool - 2018 normal exam as per patient.

## 2019-10-27 ENCOUNTER — HOSPITAL ENCOUNTER (EMERGENCY)
Age: 39
Discharge: HOME | End: 2019-10-27
Attending: EMERGENCY MEDICINE
Payer: MEDICAID

## 2019-10-27 VITALS
OXYGEN SATURATION: 96 % | HEIGHT: 64 IN | RESPIRATION RATE: 18 BRPM | BODY MASS INDEX: 34.15 KG/M2 | HEART RATE: 82 BPM | DIASTOLIC BLOOD PRESSURE: 73 MMHG | WEIGHT: 200 LBS | TEMPERATURE: 98 F | SYSTOLIC BLOOD PRESSURE: 129 MMHG

## 2019-10-27 DIAGNOSIS — R05.9 COUGH: ICD-10-CM

## 2019-10-27 DIAGNOSIS — J03.90 ACUTE TONSILLITIS, UNSPECIFIED ETIOLOGY: Primary | ICD-10-CM

## 2019-10-27 PROCEDURE — 99282 EMERGENCY DEPT VISIT SF MDM: CPT

## 2019-10-27 PROCEDURE — 74011250636 HC RX REV CODE- 250/636: Performed by: PHYSICIAN ASSISTANT

## 2019-10-27 RX ORDER — AMOXICILLIN 500 MG/1
500 TABLET, FILM COATED ORAL 3 TIMES DAILY
Qty: 30 TAB | Refills: 0 | OUTPATIENT
Start: 2019-10-27 | End: 2019-10-29

## 2019-10-27 RX ORDER — DEXAMETHASONE 4 MG/1
10 TABLET ORAL
Status: COMPLETED | OUTPATIENT
Start: 2019-10-27 | End: 2019-10-27

## 2019-10-27 RX ORDER — BENZONATATE 100 MG/1
100 CAPSULE ORAL
Qty: 30 CAP | Refills: 0 | OUTPATIENT
Start: 2019-10-27 | End: 2019-10-29

## 2019-10-27 RX ADMIN — DEXAMETHASONE 10 MG: 4 TABLET ORAL at 10:42

## 2019-10-27 NOTE — DISCHARGE INSTRUCTIONS
Knewton Activation    Thank you for requesting access to Knewton. Please follow the instructions below to securely access and download your online medical record. Knewton allows you to send messages to your doctor, view your test results, renew your prescriptions, schedule appointments, and more. How Do I Sign Up? 1. In your internet browser, go to [email or web address]  2. Click on the First Time User? Click Here link in the Sign In box. You will be redirect to the New Member Sign Up page. 3. Enter your Knewton Access Code exactly as it appears below. You will not need to use this code after youve completed the sign-up process. If you do not sign up before the expiration date, you must request a new code. Knewton Access Code: [email or web address]  Expires: 2019 10:14 AM (This is the date your Knewton access code will )    4. Enter the last four digits of your Social Security Number (xxxx) and Date of Birth (mm/dd/yyyy) as indicated and click Submit. You will be taken to the next sign-up page. 5. Create a Knewton ID. This will be your Knewton login ID and cannot be changed, so think of one that is secure and easy to remember. 6. Create a Knewton password. You can change your password at any time. 7. Enter your Password Reset Question and Answer. This can be used at a later time if you forget your password. 8. Enter your e-mail address. You will receive e-mail notification when new information is available in [address]. 9. Click Sign Up. You can now view and download portions of your medical record. 10. Click the Download Summary menu link to download a portable copy of your medical information. Additional Information    If you have questions, please visit the Frequently Asked Questions section of the Knewton website at [email or web address] Remember, Knewton is NOT to be used for urgent needs. For medical emergencies, dial 911.        Complete all medications as prescribed. Follow-up with primary care doctor in 1 week. Return to the ED immediately for any new or worsening symptoms.

## 2019-10-27 NOTE — ED TRIAGE NOTES
The pt arrived in the ER with a CC of throat pain for 2 weeks. She said she \"woke up this morning and had like crust in her mouth. \" She also stated her left ear hurt.

## 2019-10-27 NOTE — ED PROVIDER NOTES
EMERGENCY DEPARTMENT HISTORY AND PHYSICAL EXAM    Date: 10/27/2019  Patient Name: Lubna Guerrier    History of Presenting Illness     Chief Complaint   Patient presents with    Sore Throat         History Provided By: patient    Chief Complaint: sore throat   Duration: 2 weeks  Timing:  acute  Location: throat  Quality:aching  Severity: moderate  Modifying Factors: OTC meds have not helped  Associated Symptoms: dry cough       Additional History (Context): Lubna Guerrier is a 37 y.o. female with PMH asthma, bipolar disorder, COPD, general herpes, GERD, and IBS who presents with c/o 2 weeks of a sore throat and dry cough. Patient states she is taking over-the-counter cough and cold medicine with no relief in her symptoms. 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 No other complaints reported at this time. PCP: None    Current Facility-Administered Medications   Medication Dose Route Frequency Provider Last Rate Last Dose    dexAMETHasone (DECADRON) tablet 10 mg  10 mg Oral NOW Anjana Waldrop, PA-C         Current Outpatient Medications   Medication Sig Dispense Refill    benzonatate (TESSALON PERLES) 100 mg capsule Take 1 Cap by mouth three (3) times daily as needed for Cough for up to 10 days. 30 Cap 0    amoxicillin 500 mg tab Take 500 mg by mouth three (3) times daily. 30 Tab 0    venlafaxine-SR (EFFEXOR-XR) 150 mg capsule Take 1 Cap by mouth daily.  busPIRone (BUSPAR) 10 mg tablet Take 50 mg by mouth daily.  lidocaine (XYLOCAINE) 4 % topical cream Apply  to affected area.  tretinoin (RETIN-A) 0.025 % topical cream       oxybutynin chloride XL (DITROPAN XL) 10 mg CR tablet Take 1 Tab by mouth daily.  30 Tab 12    CHANTIX STARTING MONTH BOX 0.5 mg (11)- 1 mg (42) DsPk       prazosin (MINIPRESS) 2 mg capsule       medroxyPROGESTERone (DEPO-PROVERA) 150 mg/mL syrg INJECT 1 ML EVERY 3 MONTHS BY INTRAMUSCULAR ROUTE.  3    ziprasidone (GEODON) 20 mg capsule Take 20 mg by mouth two (2) times a day.  lamoTRIgine (LAMICTAL) 100 mg tablet Take 200 mg by mouth daily.  clonazePAM (KLONOPIN) 0.5 mg tablet Take 1 mg by mouth three (3) times daily as needed.  QUEtiapine (SEROQUEL) 50 mg tablet Take 50 mg by mouth daily.  BUSPIRONE HCL (BUSPAR PO) Take 50 mg by mouth daily. Past History     Past Medical History:  Past Medical History:   Diagnosis Date    Asthma     Bipolar 1 disorder ([facility] [address]     [facility]ed by [facility], depression and anxiety    Blepharitis     COPD (chronic obstructive pulmonary disease) ([facility] [address]     Genital herpes     Genital HSV     GERD (gastroesophageal reflux disease) 4/13/2017    IBS (irritable bowel syndrome)     Numerous moles 5/10/2018    OAB (overactive bladder)     Onychomycosis     was on lamisol for this 3 mos ago. Was on it for 6 mos.      PMDD (premenstrual dysphoric disorder) 5/10/2018    Psychiatric disorder     Reactive airway disease     Tinea pedis        Past Surgical History:  Past Surgical History:   Procedure Laterality Date    HX COLONOSCOPY      age 22 for diarrhea    HX CYST REMOVAL      hand    HX GYN      HX ORTHOPAEDIC      right hand    HX OVARIAN CYST REMOVAL      right ovary       Family History:  Family History   Problem Relation Age of Onset   [facility] Migraines Mother    [facility] Stroke Mother    [facility] Depression Mother     Hypertension Mother     Diabetes Mother     Thyroid Disease Mother    [facility] Arthritis-rheumatoid Mother     Alcohol abuse Father     Cancer Father         skin cancer    Hypertension Father     Asthma Sister     Diabetes Sister     Heart Disease Sister     Depression Sister     Substance Abuse Sister         tobacco use    Hypertension Sister     Heart Failure Sister     Thyroid Disease Sister     Stroke Maternal Grandmother     Substance Abuse Maternal Grandmother         tobacco use    Breast Cancer Maternal Grandmother     Alzheimer Maternal Grandmother     Diabetes Maternal Grandfather     COPD Maternal Grandfather     Substance Abuse Maternal Grandfather         tobacco use    Substance Abuse Paternal Grandmother         tobacco use    Substance Abuse Paternal Grandfather     Breast Cancer Paternal Aunt     Arthritis Maternal Aunt        Social History:  Social History     Tobacco Use    Smoking status: Former Smoker     Packs/day: 1.50     Years: 23.00     Pack years: 34.50     Types: Cigarettes     Start date: 2/9/1992    Smokeless tobacco: Never Used   Substance Use Topics    Alcohol use: Yes     Alcohol/week: 2.0 standard drinks     Types: 2 Glasses of wine per week     Frequency: 2-4 times a month     Drinks per session: 1 or 2     Binge frequency: Less than monthly     Comment: 2x week and on weekends: 1-6 drinks/sitting    Drug use: No     Types: Prescription, OTC       Allergies: Allergies   Allergen Reactions    Prozac [Fluoxetine] Unknown (comments) and Other (comments)         Review of Systems   Review of Systems   Constitutional: Negative. Negative for chills and fever. HENT: Positive for sore throat. Negative for congestion, ear pain and rhinorrhea. Eyes: Negative. Negative for pain and redness. Respiratory: Positive for cough. Negative for shortness of breath, wheezing and stridor. Cardiovascular: Negative. Negative for chest pain and leg swelling. Gastrointestinal: Negative. Negative for abdominal pain, constipation, diarrhea, nausea and vomiting. Genitourinary: Negative. Negative for dysuria and frequency. Musculoskeletal: Negative. Negative for back pain and neck pain. Skin: Negative. Negative for rash and wound. Neurological: Negative. Negative for dizziness, seizures, syncope and headaches. All other systems reviewed and are negative.     All Other Systems Negative  Physical Exam     Vitals:    10/27/19 1012   BP: 129/73   Pulse: 82   Resp: 18   Temp: 98 °F (36.7 °C)   SpO2: 96% Weight: 90.7 kg (200 lb)   Height: 5' 4\" (1.626 m)     Physical Exam   Constitutional: She is oriented to person, place, and time. She appears well-developed and well-nourished. No distress. HENT:   Head: Normocephalic and atraumatic. Tonsils enlarged, erythematous, mallampati 3, scattered exudates noted. Airway is patent, able to clear secretions, no trismus drooling or uvular deviation noted   Eyes: Conjunctivae are normal. Right eye exhibits no discharge. Left eye exhibits no discharge. No scleral icterus. Neck: Normal range of motion. Neck supple. Cardiovascular: Normal rate, regular rhythm and normal heart sounds. Exam reveals no gallop and no friction rub. No murmur heard. Pulmonary/Chest: Effort normal and breath sounds normal. No stridor. No respiratory distress. She has no wheezes. She has no rales. Musculoskeletal: Normal range of motion. Neurological: She is alert and oriented to person, place, and time. Coordination normal.   Gait is steady and patient exhibits no evidence of ataxia. Patient is able to ambulate without difficulty. No focal neurological deficit noted. No facial droop, slurred speech, or evidence of altered mentation noted on exam.     Skin: Skin is warm and dry. No rash noted. She is not diaphoretic. No erythema. Psychiatric: She has a normal mood and affect. Her behavior is normal. Thought content normal.   Nursing note and vitals reviewed. Diagnostic Study Results     Labs -   No results found for this or any previous visit (from the past 12 hour(s)). Radiologic Studies -   No orders to display     CT Results  (Last 48 hours)    None        CXR Results  (Last 48 hours)    None            Medical Decision Making   I am the first provider for this patient. I reviewed the vital signs, available nursing notes, past medical history, past surgical history, family history and social history. Vital Signs-Reviewed the patient's vital signs.         Records Reviewed: Anjana Waldrop PA-C     Procedures:  Procedures    Provider Notes (Medical Decision Making): Impression:  Acute tonsillitis    Decadron given PO in the ED. Will plan to d/c with amoxicillin and tessalon with pcp follow-up. Pt agrees. Anjana Waldrop PA-C     MED RECONCILIATION:  Current Facility-Administered Medications   Medication Dose Route Frequency    dexAMETHasone (DECADRON) tablet 10 mg  10 mg Oral NOW     Current Outpatient Medications   Medication Sig    benzonatate (TESSALON PERLES) 100 mg capsule Take 1 Cap by mouth three (3) times daily as needed for Cough for up to 10 days.  amoxicillin 500 mg tab Take 500 mg by mouth three (3) times daily.  venlafaxine-SR (EFFEXOR-XR) 150 mg capsule Take 1 Cap by mouth daily.  busPIRone (BUSPAR) 10 mg tablet Take 50 mg by mouth daily.  lidocaine (XYLOCAINE) 4 % topical cream Apply  to affected area.  tretinoin (RETIN-A) 0.025 % topical cream     oxybutynin chloride XL (DITROPAN XL) 10 mg CR tablet Take 1 Tab by mouth daily.  CHANTIX STARTING MONTH BOX 0.5 mg (11)- 1 mg (42) DsPk     prazosin (MINIPRESS) 2 mg capsule     medroxyPROGESTERone (DEPO-PROVERA) 150 mg/mL syrg INJECT 1 ML EVERY 3 MONTHS BY INTRAMUSCULAR ROUTE.  ziprasidone (GEODON) 20 mg capsule Take 20 mg by mouth two (2) times a day.  lamoTRIgine (LAMICTAL) 100 mg tablet Take 200 mg by mouth daily.  clonazePAM (KLONOPIN) 0.5 mg tablet Take 1 mg by mouth three (3) times daily as needed.  QUEtiapine (SEROQUEL) 50 mg tablet Take 50 mg by mouth daily.  BUSPIRONE HCL (BUSPAR PO) Take 50 mg by mouth daily. Disposition:  D/c    DISCHARGE NOTE:   Patient is stable for discharge at this time. I have discussed all the findings from today's work up with the patient, including lab results and imaging. I have answered all questions. Rx for amoxicillin and tessalon given. Rest and close follow-up with the PCP recommended this week.  Return to the ED immediately for any new or worsening symptoms. Anjana Waldrop PA-C   Follow-up Information     Follow up With Specialties Details Why [address] [facility] Schedule an appointment as soon as possible for a visit in 1 week  [address]    [address] [facility] EMERGENCY DEPT Emergency Medicine  As needed, If symptoms worsen [address]  [phone]          Current Discharge Medication List      START taking these medications    Details   benzonatate (TESSALON PERLES) 100 mg capsule Take 1 Cap by mouth three (3) times daily as needed for Cough for up to 10 days. Qty: 30 Cap, Refills: 0      amoxicillin 500 mg tab Take 500 mg by mouth three (3) times daily. Qty: 30 Tab, Refills: 0                 Diagnosis     Clinical Impression:   1. Acute tonsillitis, unspecified etiology    2.  Cough

## 2019-10-29 ENCOUNTER — HOSPITAL ENCOUNTER (EMERGENCY)
Age: 39
Discharge: HOME | End: 2019-10-29
Attending: EMERGENCY MEDICINE
Payer: MEDICAID

## 2019-10-29 VITALS
HEART RATE: 87 BPM | BODY MASS INDEX: 33.32 KG/M2 | OXYGEN SATURATION: 97 % | SYSTOLIC BLOOD PRESSURE: 117 MMHG | TEMPERATURE: 98 F | HEIGHT: 65 IN | RESPIRATION RATE: 16 BRPM | WEIGHT: 200 LBS | DIASTOLIC BLOOD PRESSURE: 69 MMHG

## 2019-10-29 DIAGNOSIS — R19.7 DIARRHEA, UNSPECIFIED TYPE: Primary | ICD-10-CM

## 2019-10-29 LAB
ANION GAP SERPL CALC-SCNC: 7 MMOL/L (ref 3–18)
APPEARANCE UR: CLEAR
BASOPHILS # BLD: 0 K/UL (ref 0–0.1)
BASOPHILS NFR BLD: 0 % (ref 0–2)
BILIRUB UR QL: NEGATIVE
BUN SERPL-MCNC: 12 MG/DL (ref 7–18)
BUN/CREAT SERPL: 17 (ref 12–20)
CALCIUM SERPL-MCNC: 8.9 MG/DL (ref 8.5–10.1)
CHLORIDE SERPL-SCNC: 106 MMOL/L (ref 100–111)
CO2 SERPL-SCNC: 26 MMOL/L (ref 21–32)
COLOR UR: YELLOW
CREAT SERPL-MCNC: 0.7 MG/DL (ref 0.6–1.3)
DIFFERENTIAL METHOD BLD: ABNORMAL
EOSINOPHIL # BLD: 0 K/UL (ref 0–0.4)
EOSINOPHIL NFR BLD: 0 % (ref 0–5)
ERYTHROCYTE [DISTWIDTH] IN BLOOD BY AUTOMATED COUNT: 13.8 % (ref 11.6–14.5)
GLUCOSE SERPL-MCNC: 159 MG/DL (ref 74–99)
GLUCOSE UR STRIP.AUTO-MCNC: NEGATIVE MG/DL
HCT VFR BLD AUTO: 41.2 % (ref 35–45)
HGB BLD-MCNC: 13.4 G/DL (ref 12–16)
HGB UR QL STRIP: NEGATIVE
KETONES UR QL STRIP.AUTO: NEGATIVE MG/DL
LEUKOCYTE ESTERASE UR QL STRIP.AUTO: NEGATIVE
LYMPHOCYTES # BLD: 4.2 K/UL (ref 0.9–3.6)
LYMPHOCYTES NFR BLD: 28 % (ref 21–52)
MCH RBC QN AUTO: 29 PG (ref 24–34)
MCHC RBC AUTO-ENTMCNC: 32.5 G/DL (ref 31–37)
MCV RBC AUTO: 89.2 FL (ref 74–97)
MONOCYTES # BLD: 1.3 K/UL (ref 0.05–1.2)
MONOCYTES NFR BLD: 9 % (ref 3–10)
NEUTS SEG # BLD: 9.6 K/UL (ref 1.8–8)
NEUTS SEG NFR BLD: 63 % (ref 40–73)
NITRITE UR QL STRIP.AUTO: NEGATIVE
PH UR STRIP: 5.5 [PH] (ref 5–8)
PLATELET # BLD AUTO: 294 K/UL (ref 135–420)
PMV BLD AUTO: 8.8 FL (ref 9.2–11.8)
POTASSIUM SERPL-SCNC: 3.2 MMOL/L (ref 3.5–5.5)
PROT UR STRIP-MCNC: NEGATIVE MG/DL
RBC # BLD AUTO: 4.62 M/UL (ref 4.2–5.3)
SODIUM SERPL-SCNC: 139 MMOL/L (ref 136–145)
SP GR UR REFRACTOMETRY: 1.01 (ref 1–1.03)
UROBILINOGEN UR QL STRIP.AUTO: 1 EU/DL (ref 0.2–1)
WBC # BLD AUTO: 15.2 K/UL (ref 4.6–13.2)

## 2019-10-29 PROCEDURE — 99283 EMERGENCY DEPT VISIT LOW MDM: CPT

## 2019-10-29 PROCEDURE — 80048 BASIC METABOLIC PNL TOTAL CA: CPT

## 2019-10-29 PROCEDURE — 85025 COMPLETE CBC W/AUTO DIFF WBC: CPT

## 2019-10-29 PROCEDURE — 81003 URINALYSIS AUTO W/O SCOPE: CPT

## 2019-10-29 NOTE — ED PROVIDER NOTES
HPI patient was seen here 2 days ago treated for tonsillitis and started on oral antibiotics. She says since that time she is been having an upset stomach and frequent loose bowel movements. She complains of epigastric burning type sensation with intestinal cramps. The cramping is relieved with a bowel movement. She denies any fever chills chest pain shortness of breath. She does admit to some nausea as well. No other complaints given at this time. Past Medical History:   Diagnosis Date    Asthma     Bipolar 1 disorder ([facility] [address]     Followed by [facility], depression and anxiety    Blepharitis     COPD (chronic obstructive pulmonary disease) ([facility] [address]     Genital herpes     Genital HSV     GERD (gastroesophageal reflux disease) 4/13/2017    IBS (irritable bowel syndrome)     Numerous moles 5/10/2018    OAB (overactive bladder)     Onychomycosis     was on lamisol for this 3 mos ago. Was on it for 6 mos.      PMDD (premenstrual dysphoric disorder) 5/10/2018    Psychiatric disorder     Reactive airway disease     Tinea pedis        Past Surgical History:   Procedure Laterality Date    HX COLONOSCOPY      age 22 for diarrhea    HX CYST REMOVAL      hand    HX GYN      HX ORTHOPAEDIC      right hand    HX OVARIAN CYST REMOVAL      right ovary         Family History:   Problem Relation Age of Onset   Roel Radha Migraines Mother     Stroke Mother     Depression Mother     Hypertension Mother     Diabetes Mother     Thyroid Disease Mother    Roel Radha Arthritis-rheumatoid Mother     Alcohol abuse Father     Cancer Father         skin cancer    Hypertension Father     Asthma Sister     Diabetes Sister     Heart Disease Sister     Depression Sister     Substance Abuse Sister         tobacco use    Hypertension Sister     Heart Failure Sister     Thyroid Disease Sister     Stroke Maternal Grandmother     Substance Abuse Maternal Grandmother         tobacco use    Breast Cancer Maternal Grandmother     Alzheimer Maternal Grandmother     Diabetes Maternal Grandfather     COPD Maternal Grandfather     Substance Abuse Maternal Grandfather         tobacco use    Substance Abuse Paternal Grandmother         tobacco use    Substance Abuse Paternal Grandfather     Breast Cancer Paternal Aunt     Arthritis Maternal Aunt        Social History     Socioeconomic History    Marital status: SINGLE     Spouse name: Not on file    Number of children: Not on file    Years of education: Not on file    Highest education level: Not on file   Occupational History    Not on file   Social Needs    Financial resource strain: Not on file    Food insecurity:     Worry: Not on file     Inability: Not on file    Transportation needs:     Medical: Not on file     Non-medical: Not on file   Tobacco Use    Smoking status: Former Smoker     Packs/day: 1.50     Years: 23.00     Pack years: 34.50     Types: Cigarettes     Start date: 2/9/1992    Smokeless tobacco: Never Used   Substance and Sexual Activity    Alcohol use:  Yes     Alcohol/week: 2.0 standard drinks     Types: 2 Glasses of wine per week     Frequency: 2-4 times a month     Drinks per session: 1 or 2     Binge frequency: Less than monthly     Comment: 2x week and on weekends: 1-6 drinks/sitting    Drug use: No     Types: Prescription, OTC    Sexual activity: Yes     Partners: Female     Comment: condoms   Lifestyle    Physical activity:     Days per week: Not on file     Minutes per session: Not on file    Stress: Not on file   Relationships    Social connections:     Talks on phone: Not on file     Gets together: Not on file     Attends Mosque service: Not on file     Active member of club or organization: Not on file     Attends meetings of clubs or organizations: Not on file     Relationship status: Not on file    Intimate partner violence:     Fear of current or ex partner: Not on file     Emotionally abused: Not on file Physically abused: Not on file     Forced sexual activity: Not on file   Other Topics Concern    Not on file   Social History Narrative    Not on file         ALLERGIES: Prozac [fluoxetine]    Review of Systems   Constitutional: Negative. HENT: Negative. Eyes: Negative. Respiratory: Negative. Cardiovascular: Negative. Gastrointestinal: Positive for abdominal pain and diarrhea. Genitourinary: Negative. Musculoskeletal: Negative. Skin: Negative. Neurological: Negative. Psychiatric/Behavioral: Negative. Vitals:    10/29/19 1634   BP: 117/69   Pulse: 87   Resp: 16   Temp: 98 °F (36.7 °C)   SpO2: 97%   Weight: 90.7 kg (200 lb)   Height: 5' 4.5\" (1.638 m)            Physical Exam   Constitutional: She is oriented to person, place, and time. She appears well-developed. HENT:   Head: Normocephalic and atraumatic. Mouth/Throat: Oropharynx is clear and moist.   Eyes: Pupils are equal, round, and reactive to light. Conjunctivae and EOM are normal.   Neck: Normal range of motion. Neck supple. Cardiovascular: Normal rate and regular rhythm. Pulmonary/Chest: Effort normal and breath sounds normal.   Abdominal: Soft. Musculoskeletal: Normal range of motion. Neurological: She is alert and oriented to person, place, and time. Skin: Skin is warm and dry. Psychiatric: She has a normal mood and affect. Nursing note and vitals reviewed. MDM         Procedures            I reviewed the ER results with the patient who is resting comfortably with no new complaints. She understands and agrees with the disposition and follow-up plan.   Heather Shelton MD 5:34 PM

## 2019-10-29 NOTE — DISCHARGE INSTRUCTIONS

## 2019-10-29 NOTE — ED TRIAGE NOTES
Pt states that she was seen here Miguel Pandey for sore throat and was put on an antibiotic and since then has had abdominal pain with diarrhea. Pt reports worsening sore throat.

## 2019-11-19 ENCOUNTER — HOSPITAL ENCOUNTER (OUTPATIENT)
Dept: LAB | Age: 39
Discharge: HOME | End: 2019-11-19

## 2019-11-19 LAB — SENTARA SPECIMEN COL,SENBCF: NORMAL

## 2019-11-19 PROCEDURE — 99001 SPECIMEN HANDLING PT-LAB: CPT

## 2019-11-20 LAB
A-G RATIO,AGRAT: 1.2 RATIO (ref 1.1–2.6)
ALBUMIN SERPL-MCNC: 4.7 G/DL (ref 3.5–5)
ALP SERPL-CCNC: 169 U/L (ref 25–115)
ALT SERPL-CCNC: 17 U/L (ref 5–40)
ANION GAP SERPL CALC-SCNC: 22 MMOL/L
AST SERPL W P-5'-P-CCNC: 18 U/L (ref 10–37)
AVG GLU, 10930: 120 MG/DL (ref 91–123)
BILIRUB SERPL-MCNC: 0.2 MG/DL (ref 0.2–1.2)
BUN SERPL-MCNC: 8 MG/DL (ref 6–22)
CALCIUM SERPL-MCNC: 9.6 MG/DL (ref 8.4–10.5)
CHLORIDE SERPL-SCNC: 107 MMOL/L (ref 98–110)
CHOLEST SERPL-MCNC: 182 MG/DL (ref 110–200)
CO2 SERPL-SCNC: 19 MMOL/L (ref 20–32)
CREAT SERPL-MCNC: 0.6 MG/DL (ref 0.5–1.2)
ERYTHROCYTE [DISTWIDTH] IN BLOOD BY AUTOMATED COUNT: 13.8 % (ref 10–15.5)
GFRAA, 66117: >60
GFRNA, 66118: >60
GLOBULIN,GLOB: 4 G/DL (ref 2–4)
GLUCOSE SERPL-MCNC: 164 MG/DL (ref 70–99)
HBA1C MFR BLD HPLC: 5.8 % (ref 4.8–5.6)
HCT VFR BLD AUTO: 43.7 % (ref 35.1–46.5)
HDLC SERPL-MCNC: 38 MG/DL
HDLC SERPL-MCNC: 4.8 MG/DL (ref 0–5)
HGB BLD-MCNC: 13.4 G/DL (ref 11.7–15.5)
LDL/HDL RATIO,LDHD: 3
LDLC SERPL CALC-MCNC: 115 MG/DL (ref 50–99)
MCH RBC QN AUTO: 28 PG (ref 26–34)
MCHC RBC AUTO-ENTMCNC: 31 G/DL (ref 31–36)
MCV RBC AUTO: 93 FL (ref 81–99)
NON-HDL CHOLESTEROL, 011976: 144 MG/DL
PLATELET # BLD AUTO: 305 K/UL (ref 140–440)
PMV BLD AUTO: 9.4 FL (ref 9–13)
POTASSIUM SERPL-SCNC: 5 MMOL/L (ref 3.5–5.5)
PROT SERPL-MCNC: 8.7 G/DL (ref 6.4–8.3)
RBC # BLD AUTO: 4.72 M/UL (ref 3.8–5.2)
SODIUM SERPL-SCNC: 148 MMOL/L (ref 133–145)
TRIGL SERPL-MCNC: 144 MG/DL (ref 40–149)
TSH SERPL DL<=0.005 MIU/L-ACNC: 2.14 MCU/ML (ref 0.27–4.2)
VLDLC SERPL CALC-MCNC: 29 MG/DL (ref 8–30)
WBC # BLD AUTO: 9.2 K/UL (ref 4–11)

## 2019-11-22 DIAGNOSIS — E87.0 HYPERNATREMIA: ICD-10-CM

## 2019-11-22 DIAGNOSIS — R77.9 ELEVATED SERUM PROTEIN LEVEL: ICD-10-CM

## 2019-11-22 DIAGNOSIS — R79.89 ABNORMAL CBC: Primary | ICD-10-CM

## 2019-11-22 NOTE — PROGRESS NOTES
Elevated wbc count. Elevated protein. Prediabetes. For now need to repeat cbc as recent ER visits for tonsillitis. Still symptomatic. ?? Need to ask to pt. Will recheck cbc with diff along with CMP.

## 2019-12-09 NOTE — PROGRESS NOTES
Repeat wnc count wnl. Also noted potassium is higher normal range. Sodium little bit up. For now drink more fluid and advised her to  Come back for follow up and discuss result further.

## 2019-12-31 ENCOUNTER — TELEPHONE (OUTPATIENT)
Dept: FAMILY MEDICINE CLINIC | Age: 39
End: 2019-12-31

## 2020-01-06 ENCOUNTER — OFFICE VISIT (OUTPATIENT)
Dept: PULMONOLOGY | Age: 40
End: 2020-01-06

## 2020-01-06 VITALS
BODY MASS INDEX: 36.81 KG/M2 | TEMPERATURE: 97.9 F | DIASTOLIC BLOOD PRESSURE: 71 MMHG | OXYGEN SATURATION: 95 % | WEIGHT: 215.6 LBS | HEART RATE: 99 BPM | HEIGHT: 64 IN | SYSTOLIC BLOOD PRESSURE: 156 MMHG | RESPIRATION RATE: 18 BRPM

## 2020-01-06 DIAGNOSIS — F31.9 BIPOLAR AFFECTIVE DISORDER, REMISSION STATUS UNSPECIFIED (HCC): ICD-10-CM

## 2020-01-06 DIAGNOSIS — J45.909 ASTHMA, UNSPECIFIED ASTHMA SEVERITY, UNSPECIFIED WHETHER COMPLICATED, UNSPECIFIED WHETHER PERSISTENT: Primary | ICD-10-CM

## 2020-01-06 DIAGNOSIS — Z87.891 HISTORY OF TOBACCO USE: ICD-10-CM

## 2020-01-06 DIAGNOSIS — F41.9 ANXIETY: ICD-10-CM

## 2020-01-06 DIAGNOSIS — E66.9 OBESITY (BMI 35.0-39.9 WITHOUT COMORBIDITY): ICD-10-CM

## 2020-01-06 RX ORDER — BUDESONIDE AND FORMOTEROL FUMARATE DIHYDRATE 160; 4.5 UG/1; UG/1
2 AEROSOL RESPIRATORY (INHALATION) 2 TIMES DAILY
Qty: 1 INHALER | Refills: 0 | Status: SHIPPED | COMMUNITY
Start: 2020-01-06 | End: 2020-03-13

## 2020-01-06 RX ORDER — TOPIRAMATE 25 MG/1
25 TABLET ORAL
COMMUNITY
End: 2020-02-06

## 2020-01-06 NOTE — PATIENT INSTRUCTIONS
 Start symbicort 160/4.5, two inhalations twice    daily, rinse mouth out after use.        Continue albuterol nebulizer treatment or rescue inhaler, 2 inhalations every 4-6 hours as needed for shortness of breath, wheezing or cough    · Continue smoking cessation     Recommend healthy diet/weight and exercise as tolerated     Follow-up in pulmonary clinic in or sooner with worsening of symptoms     Report to the ER with chest pain or difficulty breathing

## 2020-01-06 NOTE — PROGRESS NOTES
Galilea Ozuna presents today for   Chief Complaint   Patient presents with    Asthma     Pt states she quit smoking 8/11/2019.  COPD    Cough    Shortness of Breath     With exertion. Is someone accompanying this pt? No.    Is the patient using any DME equipment during OV? No.    -DME Company NA. Depression Screening:  3 most recent PHQ Screens 10/4/2019   Little interest or pleasure in doing things Not at all   Feeling down, depressed, irritable, or hopeless Not at all   Total Score PHQ 2 0       Learning Assessment:  Learning Assessment 10/4/2019   PRIMARY LEARNER Patient   HIGHEST LEVEL OF EDUCATION - PRIMARY LEARNER  [id number] PRIMARY LEARNER COGNITIVE   CO-LEARNER CAREGIVER No   PRIMARY LANGUAGE ENGLISH    NEED No   LEARNER PREFERENCE PRIMARY PICTURES     VIDEOS   LEARNING SPECIAL TOPICS No   ANSWERED BY patient   RELATIONSHIP SELF       Abuse Screening:  Abuse Screening Questionnaire 10/4/2019   Do you ever feel afraid of your partner? N   Are you in a relationship with someone who physically or mentally threatens you? N   Is it safe for you to go home? Y       Fall Risk  No flowsheet data found. Coordination of Care:  1. Have you been to the ER, urgent care clinic since your last visit? Hospitalized since your last visit? No    2. Have you seen or consulted any other health care providers outside of the [address] [place] [address] since your last visit? Include any pap smears or colon screening. Dr. Mosley Mom. Gynacology.
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
## 2020-01-14 ENCOUNTER — HOSPITAL ENCOUNTER (OUTPATIENT)
Dept: LAB | Age: 40
Discharge: HOME | End: 2020-01-14
Payer: MEDICAID

## 2020-01-14 DIAGNOSIS — R77.9 ELEVATED SERUM PROTEIN LEVEL: ICD-10-CM

## 2020-01-14 DIAGNOSIS — E87.0 HYPERNATREMIA: ICD-10-CM

## 2020-01-14 DIAGNOSIS — R79.89 ABNORMAL CBC: ICD-10-CM

## 2020-01-14 LAB
ALBUMIN SERPL-MCNC: 3.7 G/DL (ref 3.4–5)
ALBUMIN/GLOB SERPL: 0.9 {RATIO} (ref 0.8–1.7)
ALP SERPL-CCNC: 154 U/L (ref 45–117)
ALT SERPL-CCNC: 23 U/L (ref 13–56)
ANION GAP SERPL CALC-SCNC: 5 MMOL/L (ref 3–18)
AST SERPL-CCNC: 17 U/L (ref 10–38)
BASOPHILS # BLD: 0 K/UL (ref 0–0.1)
BASOPHILS NFR BLD: 0 % (ref 0–2)
BILIRUB SERPL-MCNC: 0.3 MG/DL (ref 0.2–1)
BUN SERPL-MCNC: 8 MG/DL (ref 7–18)
BUN/CREAT SERPL: 12 (ref 12–20)
CALCIUM SERPL-MCNC: 9.2 MG/DL (ref 8.5–10.1)
CHLORIDE SERPL-SCNC: 108 MMOL/L (ref 100–111)
CHOLEST SERPL-MCNC: 176 MG/DL
CO2 SERPL-SCNC: 27 MMOL/L (ref 21–32)
CREAT SERPL-MCNC: 0.68 MG/DL (ref 0.6–1.3)
DIFFERENTIAL METHOD BLD: NORMAL
EOSINOPHIL # BLD: 0 K/UL (ref 0–0.4)
EOSINOPHIL NFR BLD: 0 % (ref 0–5)
ERYTHROCYTE [DISTWIDTH] IN BLOOD BY AUTOMATED COUNT: 13.7 % (ref 11.6–14.5)
EST. AVERAGE GLUCOSE BLD GHB EST-MCNC: 111 MG/DL
GLOBULIN SER CALC-MCNC: 4.3 G/DL (ref 2–4)
GLUCOSE SERPL-MCNC: 75 MG/DL (ref 74–99)
HBA1C MFR BLD: 5.5 % (ref 4.2–5.6)
HCT VFR BLD AUTO: 38.3 % (ref 35–45)
HDLC SERPL-MCNC: 36 MG/DL (ref 40–60)
HDLC SERPL: 4.9 {RATIO} (ref 0–5)
HGB BLD-MCNC: 12.6 G/DL (ref 12–16)
LDLC SERPL CALC-MCNC: 96 MG/DL (ref 0–100)
LIPID PROFILE,FLP: ABNORMAL
LYMPHOCYTES # BLD: 2.2 K/UL (ref 0.9–3.6)
LYMPHOCYTES NFR BLD: 25 % (ref 21–52)
MCH RBC QN AUTO: 28.5 PG (ref 24–34)
MCHC RBC AUTO-ENTMCNC: 32.9 G/DL (ref 31–37)
MCV RBC AUTO: 86.7 FL (ref 74–97)
MONOCYTES # BLD: 0.8 K/UL (ref 0.05–1.2)
MONOCYTES NFR BLD: 9 % (ref 3–10)
NEUTS SEG # BLD: 6 K/UL (ref 1.8–8)
NEUTS SEG NFR BLD: 66 % (ref 40–73)
PLATELET # BLD AUTO: 279 K/UL (ref 135–420)
PMV BLD AUTO: 9.4 FL (ref 9.2–11.8)
POTASSIUM SERPL-SCNC: 4.1 MMOL/L (ref 3.5–5.5)
PROT SERPL-MCNC: 8 G/DL (ref 6.4–8.2)
RBC # BLD AUTO: 4.42 M/UL (ref 4.2–5.3)
SODIUM SERPL-SCNC: 140 MMOL/L (ref 136–145)
TRIGL SERPL-MCNC: 220 MG/DL (ref ?–150)
TSH SERPL DL<=0.05 MIU/L-ACNC: 0.91 UIU/ML (ref 0.36–3.74)
VLDLC SERPL CALC-MCNC: 44 MG/DL
WBC # BLD AUTO: 9.1 K/UL (ref 4.6–13.2)

## 2020-01-14 PROCEDURE — 80061 LIPID PANEL: CPT

## 2020-01-14 PROCEDURE — 84443 ASSAY THYROID STIM HORMONE: CPT

## 2020-01-14 PROCEDURE — 36415 COLL VENOUS BLD VENIPUNCTURE: CPT

## 2020-01-14 PROCEDURE — 85025 COMPLETE CBC W/AUTO DIFF WBC: CPT

## 2020-01-14 PROCEDURE — 83036 HEMOGLOBIN GLYCOSYLATED A1C: CPT

## 2020-01-14 PROCEDURE — 80053 COMPREHEN METABOLIC PANEL: CPT

## 2020-01-15 ENCOUNTER — OFFICE VISIT (OUTPATIENT)
Dept: FAMILY MEDICINE CLINIC | Age: 40
End: 2020-01-15

## 2020-01-15 VITALS
OXYGEN SATURATION: 97 % | DIASTOLIC BLOOD PRESSURE: 70 MMHG | BODY MASS INDEX: 37.56 KG/M2 | SYSTOLIC BLOOD PRESSURE: 124 MMHG | WEIGHT: 220 LBS | HEIGHT: 64 IN | TEMPERATURE: 98.5 F | RESPIRATION RATE: 16 BRPM | HEART RATE: 85 BPM

## 2020-01-15 DIAGNOSIS — K21.9 GASTROESOPHAGEAL REFLUX DISEASE WITHOUT ESOPHAGITIS: ICD-10-CM

## 2020-01-15 DIAGNOSIS — K58.0 IRRITABLE BOWEL SYNDROME WITH DIARRHEA: ICD-10-CM

## 2020-01-15 DIAGNOSIS — E66.01 SEVERE OBESITY (HCC): ICD-10-CM

## 2020-01-15 DIAGNOSIS — Z87.891 QUIT SMOKING: ICD-10-CM

## 2020-01-15 DIAGNOSIS — F41.9 ANXIETY DISORDER, UNSPECIFIED TYPE: ICD-10-CM

## 2020-01-15 DIAGNOSIS — F31.9 BIPOLAR AFFECTIVE DISORDER, REMISSION STATUS UNSPECIFIED (HCC): Primary | ICD-10-CM

## 2020-01-15 DIAGNOSIS — F10.10 ALCOHOL ABUSE: ICD-10-CM

## 2020-01-15 DIAGNOSIS — E78.1 HYPERTRIGLYCERIDEMIA: ICD-10-CM

## 2020-01-15 DIAGNOSIS — J44.9 COPD WITH ASTHMA (HCC): ICD-10-CM

## 2020-01-15 NOTE — PROGRESS NOTES
Chief Complaint   Patient presents with    Anxiety    Bipolar    Results     1. Have you been to the ER, urgent care clinic since your last visit? Hospitalized since your last visit? No    2. Have you seen or consulted any other health care providers outside of the [address] [place] [address] since your last visit? Include any pap smears or colon screening.  No

## 2020-01-15 NOTE — PROGRESS NOTES
HISTORY OF PRESENT ILLNESS  Matt Luke is a 37 y.o. female. HPI: Here for follow up. Recently established care   H/o bipolar disorder ,anxiety. Following psychiatrist. No new recommendations. Said fairly stable mood. Fairly stable sleep. No specific concern. Review labs. Noted elevated triglyceride. Said she did not fast that day. Also dsicussed diet modification. Avoid fatty and fried food. Hand out given in AVS.  She understood it well. Also given copy to discuss it with psychiatrist as medication can elevated triglyceride as well. Visit Vitals  /70 (BP 1 Location: Left arm, BP Patient Position: Sitting)   Pulse 85   Temp 98.5 °F (36.9 °C) (Oral)   Resp 16   Ht 5' 4\" (1.626 m)   Wt 220 lb (99.8 kg)   SpO2 97%   BMI 37.76 kg/m²     Review medication list, vitals, problem list,allergies. Lab Results   Component Value Date/Time    WBC 9.1 01/14/2020 04:40 PM    HGB 12.6 01/14/2020 04:40 PM    HCT 38.3 01/14/2020 04:40 PM    PLATELET 111 93/27/4216 04:40 PM    MCV 86.7 01/14/2020 04:40 PM     Lab Results   Component Value Date/Time    Sodium 140 01/14/2020 04:40 PM    Potassium 4.1 01/14/2020 04:40 PM    Chloride 108 01/14/2020 04:40 PM    CO2 27 01/14/2020 04:40 PM    Anion gap 5 01/14/2020 04:40 PM    Glucose 75 01/14/2020 04:40 PM    BUN 8 01/14/2020 04:40 PM    Creatinine 0.68 01/14/2020 04:40 PM    BUN/Creatinine ratio 12 01/14/2020 04:40 PM    GFR est AA >60 01/14/2020 04:40 PM    GFR est non-AA >60 01/14/2020 04:40 PM    Calcium 9.2 01/14/2020 04:40 PM    Bilirubin, total 0.3 01/14/2020 04:40 PM    AST (SGOT) 17 01/14/2020 04:40 PM    Alk.  phosphatase 154 (H) 01/14/2020 04:40 PM    Protein, total 8.0 01/14/2020 04:40 PM    Albumin 3.7 01/14/2020 04:40 PM    Globulin 4.3 (H) 01/14/2020 04:40 PM    A-G Ratio 0.9 01/14/2020 04:40 PM    ALT (SGPT) 23 01/14/2020 04:40 PM     Lab Results   Component Value Date/Time    Cholesterol, total 176 01/14/2020 04:44 PM    HDL Cholesterol 36 (L) 01/14/2020 04:44 PM    LDL, calculated 96 01/14/2020 04:44 PM    VLDL, calculated 44 01/14/2020 04:44 PM    Triglyceride 220 (H) 01/14/2020 04:44 PM    CHOL/HDL Ratio 4.9 01/14/2020 04:44 PM     Lab Results   Component Value Date/Time    TSH 0.91 01/14/2020 04:44 PM     Lab Results   Component Value Date/Time    Hemoglobin A1c 5.5 01/14/2020 04:45 PM     Lab Results   Component Value Date/Time    Vitamin D 25-Hydroxy 20.4 (L) 10/18/2012 01:30 PM    VITAMIN D, 25-HYDROXY 39.3 07/07/2015 04:26 PM       Does drink alcohol. Still daily. Trying to cut down amount. Discussed to take multivitamin and continue making effort to cut alcohol drinking. She has stopped smoking since few months. H/o GERD and irritable bowel syndrome. Stable. No urinary or bowel complains. No GERD symptoms. H/o copd with asthma. No cough or wheezing. No chest congestion. No chest pain or sob. Quit smoking. ROS: Denies any headache, dizziness, no chest pain or trouble breathing, no arm or leg weakness. No nausea or vomiting, no weight or appetite changes, no mood changes . No urine or bowel complains, no palpitation, no diaphoresis. No abdominal pain. No cold or cough. No leg swelling. No fever. Physical Exam  Constitutional:       General: She is not in acute distress. Neck:      Comments: No palpable enlarge thyroid gland. Cardiovascular:      Rate and Rhythm: Normal rate and regular rhythm. Heart sounds: Normal heart sounds. Abdominal:      General: Bowel sounds are normal.      Palpations: Abdomen is soft. Tenderness: There is no tenderness. Lymphadenopathy:      Cervical: No cervical adenopathy. Neurological:      Mental Status: She is oriented to person, place, and time. Psychiatric:         Behavior: Behavior normal.         ASSESSMENT and PLAN    ICD-10-CM ICD-9-CM    1. Bipolar affective disorder, remission status unspecified ([facility]): stable. following psychiatrist recommendations.   F31.9 296.80 2. Severe obesity ([facility]): discussed high BMI. Discussed diet modification, calorie count and exercise. Discussed importance of weight loss. agree to do exercise and life style modification. Diet and exercise hand out given in AVS.   She does daily walking  E66.01 278.01    3. Quit smoking Z87. 891 V15.82     auguest 2019    4. Alcohol abuse: daily use. Trying to cut down. Discussed take multivitamin. If needed ask help from psychiatrist. Will f/u next visit. F10.10 305.00    5. Anxiety disorder, unspecified type: stable. F41.9 300.00    6. Gastroesophageal reflux disease without esophagitis: stable. K21.9 530.81    7. Irritable bowel syndrome with diarrhea; stable. Asymptomatic. K58.0 564.1    8. COPD with asthma ([facility] stable. No increase use of albuterol. J44.9 493.20    9. Hypertriglyceridemia: said she was not fasting. Also will recheck labs in couple of months and f/u after result. Diet modification discussed and given hand out in AVS. Also she will show labs to psychiatrist if any medication needs to be adjusted. E78.1 272.1 LIPID PANEL   Pt understood and agree with the plan   Review HM  Said coming up appt with ob/gyn for mammogram and pap smear on 30th of this month. I have advised her to provide contact info. Follow-up and Dispositions    · Return in about 3 months (around 4/15/2020).

## 2020-01-15 NOTE — PROGRESS NOTES
Diabetes test showed improvement. Still elevated triglyceride. Advise to make an appt to and will discuss result in follow up visit. Mean time low fat diet info sent to pt.

## 2020-01-15 NOTE — PATIENT INSTRUCTIONS
Learning About Low-Fat Eating What is low-fat eating? Most food has some fat in it. Your body needs some fat to be healthy. But some kinds of fats are healthier than others. In a low-fat eating plan, you try to choose healthier fats and eat fewer unhealthy fats. Healthy fats include olive and canola oil. Try to avoid eating too much saturated fat (such as in cheese and meats) and trans fat (a type of fat found in many packaged snack foods and other baked goods). You do not need to cut all fat from your diet. But you can make healthier choices about the types and amount of fat you eat. Even though it is a good idea to choose healthier fats, it is still important to be careful of how much fat you eat, because all fats are high in calories. What are the different types of fats? Unhealthy fats · Saturated fat. Saturated fats are mostly in animal foods, such as meat and dairy foods. Tropical oils, such as coconut oil, palm oil, and cocoa butter, are also saturated fats. · Trans fat. Trans fats include shortening, partially hydrogenated vegetable oils, and hydrogenated vegetable oils. Trans fats are made when a liquid fat is turned into a solid fat (for example, when corn oil is made into stick margarine). They are in many processed foods, such as cookies, crackers, and snack foods. Healthy fats · Monounsaturated fat. Monounsaturated fats are liquid at room temperature but get solid when refrigerated. Eating foods that are high in this fat may help lower your \"bad\" (LDL) cholesterol, keep your \"good\" (HDL) cholesterol level up, and lower your chances of getting coronary artery disease. This fat is found in canola oil, olive oil, peanut oil, olives, avocados, nuts, and nut butters. · Polyunsaturated fat. Polyunsaturated fats are liquid at room temperature. They are in safflower, sunflower, and corn oils. They are also the main fat in seafood.  Omega-3 fatty acids are types of polyunsaturated fat. Eating fish may lower your chances of getting coronary artery disease. Fatty fish such as salmon and mackerel contain these healthy fatty acids. So do ground flaxseeds and flaxseed oil, soybeans, walnuts, and seeds. Why cut down on unhealthy fats? Eating foods that contain saturated fats can raise the LDL (\"bad\") cholesterol in your blood. Having a high level of LDL cholesterol increases your chance of hardening of the arteries (atherosclerosis), which can lead to heart disease, heart attack, and stroke. Trans fat raises the level of \"bad\" LDL cholesterol in your blood and may lower the \"good\" HDL cholesterol in your blood. Trans fat can raise your risk of heart disease, heart attack, and stroke. In general: · No more than 10% of your daily calories should come from saturated fat. This is about 20 grams in a 2,000-calorie diet. · No more than 10% of your daily calories should come from polyunsaturated fat. This is about 20 grams in a 2,000-calorie diet. · Monounsaturated fats can be up to 15% of your daily calories. This is about 25 to 30 grams in a 2,000-calorie diet. If you're not sure how much fat you should be eating or how many calories you need each day to stay at a healthy weight, talk to a registered dietitian. He or she can help you create a plan that's right for you. What can you do to cut down on fat? Foods like cheese, butter, sausage, and desserts can have a lot of unhealthy fats. Try these tips for healthier meals at home and when you eat out. At home · Fill up on fruits, vegetables, and whole grains. · Think of meat as a side dish instead of as the main part of your meal. 
· When you do eat meat, make it extra-lean ground beef (97% lean), ground turkey breast (without skin added), meats with fat trimmed off before cooking, or skinless chicken. · Try main dishes that use whole wheat pasta, brown rice, dried beans, or vegetables. · Use cooking methods that use little or no fat, such as broiling, steaming, or grilling. Use cooking spray instead of oil. If you use oil, use a monounsaturated oil, such as canola or olive oil. · Read food labels on canned, bottled, or packaged foods. Choose those with little saturated fat and no trans fat. When eating out at a restaurant · Order foods that are broiled or poached instead of fried or breaded. · Cut back on the amount of butter or margarine that you use on bread. Use small amounts of olive oil instead. · Order sauces, gravies, and salad dressings on the side, and use only a little. · When you order pasta, choose tomato sauce instead of cream sauce. · Ask for salsa with your baked potato instead of sour cream, butter, cheese, or black. Where can you learn more? Go to http://tommieWiCastr Limitednicole.info/. Enter W487 in the search box to learn more about \"Learning About Low-Fat Eating. \" Current as of: November 7, 2018 Content Version: 12.2 © 9929-0567 [employer]. Care instructions adapted under license by [employer] (which disclaims liability or warranty for this information). If you have questions about a medical condition or this instruction, always ask your healthcare professional. [address] any warranty or liability for your use of this information. A Healthy Lifestyle: Care Instructions Your Care Instructions A healthy lifestyle can help you feel good, stay at a healthy weight, and have plenty of energy for both work and play. A healthy lifestyle is something you can share with your whole family. A healthy lifestyle also can lower your risk for serious health problems, such as high blood pressure, heart disease, and diabetes. You can follow a few steps listed below to improve your health and the health of your family. Follow-up care is a key part of your treatment and safety.  Be sure to make and go to all appointments, and call your doctor if you are having problems. It's also a good idea to know your test results and keep a list of the medicines you take. How can you care for yourself at home? · Do not eat too much sugar, fat, or fast foods. You can still have dessert and treats now and then. The goal is moderation. · Start small to improve your eating habits. Pay attention to portion sizes, drink less juice and soda pop, and eat more fruits and vegetables. ? Eat a healthy amount of food. A 3-ounce serving of meat, for example, is about the size of a deck of cards. Fill the rest of your plate with vegetables and whole grains. ? Limit the amount of soda and sports drinks you have every day. Drink more water when you are thirsty. ? Eat at least 5 servings of fruits and vegetables every day. It may seem like a lot, but it is not hard to reach this goal. A serving or helping is 1 piece of fruit, 1 cup of vegetables, or 2 cups of leafy, raw vegetables. Have an apple or some carrot sticks as an afternoon snack instead of a candy bar. Try to have fruits and/or vegetables at every meal. 
· Make exercise part of your daily routine. You may want to start with simple activities, such as walking, bicycling, or slow swimming. Try to be active 30 to 60 minutes every day. You do not need to do all 30 to 60 minutes all at once. For example, you can exercise 3 times a day for 10 or 20 minutes. Moderate exercise is safe for most people, but it is always a good idea to talk to your doctor before starting an exercise program. 
· Keep moving. [place] the lawn, work in the garden, or [place]. Take the stairs instead of the elevator at work. · If you smoke, quit. People who smoke have an increased risk for heart attack, stroke, cancer, and other lung illnesses. Quitting is hard, but there are ways to boost your chance of quitting tobacco for good. ? Use nicotine gum, patches, or lozenges. ? Ask your doctor about stop-smoking programs and medicines. ? Keep trying. In addition to reducing your risk of diseases in the future, you will notice some benefits soon after you stop using tobacco. If you have shortness of breath or asthma symptoms, they will likely get better within a few weeks after you quit. · Limit how much alcohol you drink. Moderate amounts of alcohol (up to 2 drinks a day for men, 1 drink a day for women) are okay. But drinking too much can lead to liver problems, high blood pressure, and other health problems. Family health If you have a family, there are many things you can do together to improve your health. · Eat meals together as a family as often as possible. · Eat healthy foods. This includes fruits, vegetables, lean meats and dairy, and whole grains. · Include your family in your fitness plan. Most people think of activities such as jogging or tennis as the way to fitness, but there are many ways you and your family can be more active. Anything that makes you breathe hard and gets your heart pumping is exercise. Here are some tips: 
? Walk to do errands or to take your child to school or the bus. 
? Go for a family bike ride after dinner instead of watching TV. Where can you learn more? Go to http://tommie-nicole.info/. Enter B064 in the search box to learn more about \"A Healthy Lifestyle: Care Instructions. \" Current as of: May 28, 2019 Content Version: 12.2 © 3309-8129 [employer]. Care instructions adapted under license by [employer] (which disclaims liability or warranty for this information). If you have questions about a medical condition or this instruction, always ask your healthcare professional. [place] [address] any warranty or liability for your use of this information. Bipolar Disorder: Care Instructions Your Care Instructions Bipolar disorder is an illness that causes extreme mood changes, from times of very high energy (manic episodes) to times of depression. But many people with bipolar disorder show only the symptoms of depression. These moods may cause problems with your work, school, family life, friendships, and how well you function. This disease is also called manic-depression. There is no cure for bipolar disorder, but it can be helped with medicines. Counseling may also help. It is important to take your medicines exactly as prescribed, even when you feel well. You may need lifelong treatment. Follow-up care is a key part of your treatment and safety. Be sure to make and go to all appointments, and call your doctor if you are having problems. It's also a good idea to know your test results and keep a list of the medicines you take. How can you care for yourself at home? · Be safe with medicines. Take your medicines exactly as prescribed. Do not stop or change a medicine without talking to your doctor first. Joe Krishnan and your doctor may need to try different combinations of medicines to find what works for you. · Take your medicines on schedule to keep your moods even. When you feel good, you may think that you do not need your medicines. But it is important to keep taking them. · Go to your counseling sessions. Call and talk with your counselor if you can't go to a session or if you don't think the sessions are helping. Do not just stop going. · Get at least 30 minutes of activity on most days of the week. Walking is a good choice. You also may want to do other things, such as running, swimming, or cycling. · Get enough sleep. Keep your room dark and quiet. Try to go to bed at the same time every night. · Eat a healthy diet. This includes whole grains, dairy, fruits, vegetables, and protein. Eat foods from each of these groups. · Try to lower your stress.  Manage your time, build a strong support system, and lead a healthy lifestyle. To lower your stress, try physical activity, slow deep breathing, or getting a massage. · Do not use alcohol or illegal drugs. · Learn the early signs of your mood changes. You can then take steps to help yourself feel better. · Ask for help from friends and family when you need it. You may need help with daily chores when you are depressed. When you are manic, you may need support to control your high energy levels. What should you do if someone in your family has bipolar disorder? · Learn about the disease and the signs that it is getting worse. · Remind your family member that you love him or her. · Make a plan with all family members about how to take care of your loved one when his or her symptoms are bad. · Talk about your fears and concerns and those of other family members. Seek counseling if needed. · Do not focus attention only on the person who is in treatment. · Remind yourself that it will take time for changes to occur. · Do not blame yourself for the disease. · Know your legal rights and the legal rights of your family member. Support groups or counselors can help you with this information. · Take care of yourself. Keep up with your own interests, such as your career, hobbies, and friends. Use exercise, positive self-talk, deep breathing, and other relaxing exercises to help lower your stress. · Give yourself time to grieve. You may need to deal with emotions such as anger, fear, and frustration. After you work through your feelings, you will be better able to care for yourself and your family. · If you are having a hard time with your feelings or with your relationship with your family member, talk with a counselor. When should you call for help? Call 911 anytime you think you may need emergency care. For example, call if: 
  · You feel like hurting yourself or someone else.   · Someone who has bipolar disorder displays dangerous behavior, and you think the person might hurt himself or herself or someone else.  
[facility] your doctor now or seek immediate medical care if: 
  · You hear voices.  
  · Someone you know has bipolar disorder and talks about suicide. Keep the numbers for these national suicide hotlines: 5-679-432-TALK (6-366.964.4906) and 3-508-XLOMXKV (1-976.407.9912). If a suicide threat seems real, with a specific plan and a way to carry it out, stay with the person, or ask someone you trust to stay with the person, until you can get help.  
  · Someone you know has bipolar disorder and: 
? Starts to give away possessions. ? Is using illegal drugs or drinking alcohol heavily. ? Talks or writes about death, including writing suicide notes or talking about guns, knives, or pills. ? Talks or writes about hurting someone else. ? Starts to spend a lot of time alone. ? Acts very aggressively or suddenly appears calm. ? Talks about beliefs that are not based in reality (delusions).  
 Watch closely for changes in your health, and be sure to contact your doctor if: 
  · You cannot go to your counseling sessions. Where can you learn more? Go to http://tommie-nicole.info/. Enter K052 in the search box to learn more about \"Bipolar Disorder: Care Instructions. \" Current as of: May 28, 2019 Content Version: 12.2 © 5378-2498 [employer]. Care instructions adapted under license by [id number] (which disclaims liability or warranty for this information). If you have questions about a medical condition or this instruction, always ask your healthcare professional. [address] any warranty or liability for your use of this information.

## 2020-01-16 ENCOUNTER — TELEPHONE (OUTPATIENT)
Dept: FAMILY MEDICINE CLINIC | Age: 40
End: 2020-01-16

## 2020-01-28 ENCOUNTER — HOSPITAL ENCOUNTER (OUTPATIENT)
Dept: MAMMOGRAPHY | Age: 40
Discharge: HOME | End: 2020-01-28
Attending: FAMILY MEDICINE
Payer: MEDICAID

## 2020-01-28 DIAGNOSIS — Z12.39 SCREENING FOR BREAST CANCER: ICD-10-CM

## 2020-01-28 PROCEDURE — 77067 SCR MAMMO BI INCL CAD: CPT

## 2020-01-29 DIAGNOSIS — R92.8 ABNORMAL SCREENING MAMMOGRAM: Primary | ICD-10-CM

## 2020-01-29 NOTE — PROGRESS NOTES
Let pt know that ordered diagnostic mammogram as recommended by radiologist for further evaluation for rt breast.

## 2020-01-31 ENCOUNTER — TELEPHONE (OUTPATIENT)
Dept: FAMILY MEDICINE CLINIC | Age: 40
End: 2020-01-31

## 2020-01-31 NOTE — TELEPHONE ENCOUNTER
Patient called [facility] to inquire about diagnostic mammogram order; stated she has a mammogram scheduled on 2/03/2020 at 2:00PM.    Patient was advised Dr. Jessica Bueno ordered diagnostic mammogram as recommended by radiologist for further evaluation for rt breast due to density. Patient verbalized understanding.

## 2020-02-03 ENCOUNTER — TELEPHONE (OUTPATIENT)
Dept: FAMILY MEDICINE CLINIC | Age: 40
End: 2020-02-03

## 2020-02-03 ENCOUNTER — HOSPITAL ENCOUNTER (OUTPATIENT)
Dept: MAMMOGRAPHY | Age: 40
Discharge: HOME | End: 2020-02-03
Attending: FAMILY MEDICINE
Payer: MEDICAID

## 2020-02-03 ENCOUNTER — HOSPITAL ENCOUNTER (OUTPATIENT)
Dept: ULTRASOUND IMAGING | Age: 40
Discharge: HOME | End: 2020-02-03
Attending: FAMILY MEDICINE
Payer: MEDICAID

## 2020-02-03 DIAGNOSIS — R92.8 ABNORMAL SCREENING MAMMOGRAM: ICD-10-CM

## 2020-02-03 DIAGNOSIS — R92.8 ABNORMAL MAMMOGRAM OF RIGHT BREAST: Primary | ICD-10-CM

## 2020-02-03 DIAGNOSIS — N63.0 BREAST MASS: ICD-10-CM

## 2020-02-03 PROCEDURE — 76642 ULTRASOUND BREAST LIMITED: CPT

## 2020-02-03 PROCEDURE — 77061 BREAST TOMOSYNTHESIS UNI: CPT

## 2020-02-03 NOTE — PROGRESS NOTES
Contacted patient and verified identity using name and date of birth (2- identifiers)  Spoke with patient and she verbalized understanding of benign findings and plan to repeat in 6 months.

## 2020-02-03 NOTE — TELEPHONE ENCOUNTER
Pt called and stated central scheduling called to request two more test of her breast she would like to know why. Please call pt at your earliest convenience.

## 2020-02-03 NOTE — PROGRESS NOTES
Contacted patient and verified identity using name and date of birth (2- identifiers)  Spoke with patient and she verbalized understanding of benign findings and plan to repeat in 6 months. /

## 2020-02-04 NOTE — TELEPHONE ENCOUNTER
Contacted patient and verified identity using name and date of birth (2- identifiers)  Spoke with patient and she verbalized understanding of benign findings and plan to repeat in 6 months.       Electronically signed by Leonardo Lynn LPN at 52/71/10 [id number]

## 2020-02-06 ENCOUNTER — OFFICE VISIT (OUTPATIENT)
Dept: FAMILY MEDICINE CLINIC | Age: 40
End: 2020-02-06

## 2020-02-06 VITALS
DIASTOLIC BLOOD PRESSURE: 70 MMHG | HEART RATE: 89 BPM | RESPIRATION RATE: 18 BRPM | TEMPERATURE: 98.2 F | BODY MASS INDEX: 38.45 KG/M2 | OXYGEN SATURATION: 98 % | HEIGHT: 63 IN | WEIGHT: 217 LBS | SYSTOLIC BLOOD PRESSURE: 122 MMHG

## 2020-02-06 DIAGNOSIS — F31.9 BIPOLAR 1 DISORDER (HCC): ICD-10-CM

## 2020-02-06 DIAGNOSIS — E78.1 HYPERTRIGLYCERIDEMIA: Primary | ICD-10-CM

## 2020-02-06 DIAGNOSIS — R92.8 ABNORMAL MAMMOGRAM: ICD-10-CM

## 2020-02-06 DIAGNOSIS — R40.0 DAYTIME SOMNOLENCE: ICD-10-CM

## 2020-02-06 PROBLEM — Z87.891 QUIT SMOKING: Status: RESOLVED | Noted: 2019-10-04 | Resolved: 2020-02-06

## 2020-02-06 PROBLEM — F17.200 SMOKER: Status: RESOLVED | Noted: 2018-05-10 | Resolved: 2020-02-06

## 2020-02-06 NOTE — PATIENT INSTRUCTIONS
A Healthy Lifestyle: Care Instructions Your Care Instructions A healthy lifestyle can help you feel good, stay at a healthy weight, and have plenty of energy for both work and play. A healthy lifestyle is something you can share with your whole family. A healthy lifestyle also can lower your risk for serious health problems, such as high blood pressure, heart disease, and diabetes. You can follow a few steps listed below to improve your health and the health of your family. Follow-up care is a key part of your treatment and safety. Be sure to make and go to all appointments, and call your doctor if you are having problems. It's also a good idea to know your test results and keep a list of the medicines you take. How can you care for yourself at home? · Do not eat too much sugar, fat, or fast foods. You can still have dessert and treats now and then. The goal is moderation. · Start small to improve your eating habits. Pay attention to portion sizes, drink less juice and soda pop, and eat more fruits and vegetables. ? Eat a healthy amount of food. A 3-ounce serving of meat, for example, is about the size of a deck of cards. Fill the rest of your plate with vegetables and whole grains. ? Limit the amount of soda and sports drinks you have every day. Drink more water when you are thirsty. ? Eat at least 5 servings of fruits and vegetables every day. It may seem like a lot, but it is not hard to reach this goal. A serving or helping is 1 piece of fruit, 1 cup of vegetables, or 2 cups of leafy, raw vegetables. Have an apple or some carrot sticks as an afternoon snack instead of a candy bar. Try to have fruits and/or vegetables at every meal. 
· Make exercise part of your daily routine. You may want to start with simple activities, such as walking, bicycling, or slow swimming. Try to be active 30 to 60 minutes every day.  You do not need to do all 30 to 60 minutes all at once. For example, you can exercise 3 times a day for 10 or 20 minutes. Moderate exercise is safe for most people, but it is always a good idea to talk to your doctor before starting an exercise program. 
· Keep moving. [place] the lawn, work in the garden, or [employer]. Take the stairs instead of the elevator at work. · If you smoke, quit. People who smoke have an increased risk for heart attack, stroke, cancer, and other lung illnesses. Quitting is hard, but there are ways to boost your chance of quitting tobacco for good. ? Use nicotine gum, patches, or lozenges. ? Ask your doctor about stop-smoking programs and medicines. ? Keep trying. In addition to reducing your risk of diseases in the future, you will notice some benefits soon after you stop using tobacco. If you have shortness of breath or asthma symptoms, they will likely get better within a few weeks after you quit. · Limit how much alcohol you drink. Moderate amounts of alcohol (up to 2 drinks a day for men, 1 drink a day for women) are okay. But drinking too much can lead to liver problems, high blood pressure, and other health problems. Family health If you have a family, there are many things you can do together to improve your health. · Eat meals together as a family as often as possible. · Eat healthy foods. This includes fruits, vegetables, lean meats and dairy, and whole grains. · Include your family in your fitness plan. Most people think of activities such as jogging or tennis as the way to fitness, but there are many ways you and your family can be more active. Anything that makes you breathe hard and gets your heart pumping is exercise. Here are some tips: 
? Walk to do errands or to take your child to school or the bus. 
? Go for a family bike ride after dinner instead of watching TV. Where can you learn more? Go to http://tommie-nicole.info/. Enter B195 in the search box to learn more about \"A Healthy Lifestyle: Care Instructions. \" Current as of: May 28, 2019 Content Version: 12.2 © 7415-6793 [employer]. Care instructions adapted under license by [employer] (which disclaims liability or warranty for this information). If you have questions about a medical condition or this instruction, always ask your healthcare professional. [place] [address] any warranty or liability for your use of this information.

## 2020-02-06 NOTE — PROGRESS NOTES
Patient: Angie Lloyd MRN: [id number]  SSN: [id number]    YOB: 1980  Age: 41 y.o. Sex: female      Date of Service: 2/6/2020   Provider: NICOLE Lopez   Office Location:   [address], [address]  Office Phone: [phone]  Office Fax: [phone]      REASON FOR VISIT:   Chief Complaint   Patient presents with    Anxiety    Bipolar    Cholesterol Problem    COPD    GERD    Other     IBS        VITALS:   Visit Vitals  /70 (BP 1 Location: Right arm, BP Patient Position: Sitting)   Pulse 89   Temp 98.2 °F (36.8 °C) (Oral)   Resp 18   Ht 5' 2.5\" (1.588 m)   Wt 217 lb (98.4 kg) Comment: 215 lbs 3 0z on 01-   SpO2 98%   BMI 39.06 kg/m²        MEDICATIONS:   Current Outpatient Medications on File Prior to Visit   Medication Sig Dispense Refill    venlafaxine-SR (EFFEXOR-XR) 150 mg capsule Take 1 Cap by mouth daily.  busPIRone (BUSPAR) 10 mg tablet Take 50 mg by mouth daily.  tretinoin (RETIN-A) 0.025 % topical cream       prazosin (MINIPRESS) 2 mg capsule       medroxyPROGESTERone (DEPO-PROVERA) 150 mg/mL syrg INJECT 1 ML EVERY 3 MONTHS BY INTRAMUSCULAR ROUTE.  3    ziprasidone (GEODON) 20 mg capsule Take 20 mg by mouth two (2) times a day.  lamoTRIgine (LAMICTAL) 100 mg tablet Take 100 mg by mouth nightly.  clonazePAM (KLONOPIN) 0.5 mg tablet Take 1 mg by mouth three (3) times daily as needed.  QUEtiapine (SEROQUEL) 50 mg tablet Take 50 mg by mouth two (2) times a day.  budesonide-formoterol (SYMBICORT) 160-4.5 mcg/actuation HFAA Take 2 Puffs by inhalation two (2) times a day. 1 Inhaler 0    [DISCONTINUED] topiramate (TOPAMAX) 25 mg tablet Take 25 mg by mouth daily (with breakfast).  [DISCONTINUED] lidocaine (XYLOCAINE) 4 % topical cream Apply  to affected area.  [DISCONTINUED] oxybutynin chloride XL (DITROPAN XL) 10 mg CR tablet Take 1 Tab by mouth daily.  30 Tab 12    [DISCONTINUED] CHANTIX STARTING MONTH BOX 0.5 mg (11)- 1 mg (42) DsPk       [DISCONTINUED] BUSPIRONE HCL (BUSPAR PO) Take 50 mg by mouth daily. No current facility-administered medications on file prior to visit. ALLERGIES:   Allergies   Allergen Reactions    Prozac [Fluoxetine] Unknown (comments) and Other (comments)        MEDICAL/SURGICAL HISTORY:  Past Medical History:   Diagnosis Date    Asthma     Bipolar 1 disorder ([facility] [address]     Followed by [facility], depression and anxiety    Blepharitis     COPD (chronic obstructive pulmonary disease) ([facility] [address]     Genital herpes     Genital HSV     GERD (gastroesophageal reflux disease) 4/13/2017    IBS (irritable bowel syndrome)     Numerous moles 5/10/2018    OAB (overactive bladder)     Onychomycosis     was on lamisol for this 3 mos ago. Was on it for 6 mos.      PMDD (premenstrual dysphoric disorder) 5/10/2018    Psychiatric disorder     Reactive airway disease     Tinea pedis       Past Surgical History:   Procedure Laterality Date    HX COLONOSCOPY      age 22 for diarrhea    HX CYST REMOVAL      hand    HX GYN      HX ORTHOPAEDIC      right hand    HX OVARIAN CYST REMOVAL      right ovary        FAMILY HISTORY:  Family History   Problem Relation Age of Onset   [facility] Migraines Mother    [facility] Stroke Mother    [facility] Depression Mother     Hypertension Mother     Diabetes Mother     Thyroid Disease Mother    [facility] Arthritis-rheumatoid Mother     Alcohol abuse Father     Cancer Father         skin cancer    Hypertension Father     Asthma Sister     Diabetes Sister     Heart Disease Sister     Depression Sister     Substance Abuse Sister         tobacco use    Hypertension Sister     Heart Failure Sister     Thyroid Disease Sister     Stroke Maternal Grandmother     Substance Abuse Maternal Grandmother         tobacco use    Breast Cancer Maternal Grandmother     Alzheimer Maternal Grandmother     Diabetes Maternal Grandfather     COPD Maternal Grandfather     Substance Abuse Maternal Grandfather         tobacco use    Substance Abuse Paternal Grandmother         tobacco use    Substance Abuse Paternal Grandfather     Breast Cancer Paternal Aunt     Arthritis Maternal Aunt         SOCIAL HISTORY:  Social History     Tobacco Use    Smoking status: Former Smoker     Packs/day: 1.50     Years: 23.00     Pack years: 34.50     Types: Cigarettes     Start date: 1992     Last attempt to quit: 2019     Years since quittin.4    Smokeless tobacco: Never Used   Substance Use Topics    Alcohol use: Yes     Alcohol/week: 2.0 standard drinks     Types: 2 Glasses of wine per week     Frequency: 2-4 times a month     Drinks per session: 1 or 2     Binge frequency: Less than monthly     Comment: 2x week and on weekends: 1-6 drinks/sitting    Drug use: No     Types: Prescription, OTC             HISTORY OF PRESENT ILLNESS: Hi Gorman is a 41 y.o. female who presents to the office for a routine follow up visit. She is transferring her care to me from Dr. Frantz Cornejo     Hypertriglyceridemia -   Likely a mixture of familial factors and lifestyle  Patient mentions that she is committed to losing weight and has considered trying weight watchers   She has completely changed her habits and has been exercising more     Bipolar Disorder -   Managed by psychiatry  Well controlled on her meds, no recent changes     Fatigue -    Patient mentions that she has been feeling very sleepy lately, to the point where she's been nervous a few times that she might fall asleep at the wheel. Her recent labs were normal and there has been no change in her medications. She does mention that she hasn't been sleeping quite as well as she recently moved in with her boyfriend and is still adjusting to the shared living space.   She also volunteers that she snores and that her pulmonologist has discussed possibly ordering a sleep study at some point     Abnormal Mammogram -   R breast, recent diagnostic studies read as bi-rads 3, 6 month follow up imaging will be due in August 2020     REVIEW OF SYSTEMS:  Review of Systems   Constitutional: Negative for chills and fever. Respiratory: Negative for shortness of breath. Cardiovascular: Negative for chest pain. PHYSICAL EXAMINATION:  Physical Exam  Cardiovascular:      Rate and Rhythm: Normal rate and regular rhythm. Heart sounds: Normal heart sounds. No murmur. No friction rub. No gallop. Pulmonary:      Effort: Pulmonary effort is normal.      Breath sounds: Normal breath sounds. No wheezing or rales. Skin:     General: Skin is warm and dry. Findings: No rash. Neurological:      Mental Status: She is alert and oriented to person, place, and time. Gait: Gait is intact. Psychiatric:         Mood and Affect: Mood and affect normal.         Cognition and Memory: Memory normal.          RESULTS:  No results found for this visit on 02/06/20. ASSESSMENT/PLAN:  Diagnoses and all orders for this visit:    1. Hypertriglyceridemia  - Stable, no indication for meds at this point  - Continue to work on diet & exercise  - I think Weight Watchers would be a great option for her. Also discussed trial of calorie counting using [employer] with goal of losing no more than 1-2 lb per week    2. Bipolar 1 disorder (HCC)  - stable, f/u with psychiatry     3. Abnormal mammogram  - Reviewed in detail  - Proceed with 6 month follow up imaging of R breast in early August. Orders have already been placed     4.  Daytime somnolence/snoring  - Reassured that recent labs were essentially normal and that there was no clear cut explanation for her fatigue   - Discussed referral for sleep study, but patient is leery about CPAP  - She will work on weight loss first      Follow-up and Dispositions    · Return in about 6 months (around 8/6/2020) for annual physical .          All questions answered. Patient expresses understanding and agrees with the plan as detailed above.     PATIENT CARE TEAM:   Patient Care Team:  NICOLE Cruz as PCP - General (Physician Assistant)  Georgia Sherwood MD as PCP - [facility]  Wanda Germain MD (Dermatology)  Toni Jimenez MD (Cardiology)  Taina Trinidad MD (Pulmonary Disease)  Christine Portillo NP (Nurse Practitioner)       NICOLE Navas   February 6, 2020   12:31 PM

## 2020-02-06 NOTE — PROGRESS NOTES
1. Have you been to the ER, urgent care clinic since your last visit? Hospitalized since your last visit? No    2. Have you seen or consulted any other health care providers outside of the [address] [place] [address] since your last visit? Include any pap smears or colon screening.  No

## 2020-03-13 ENCOUNTER — HOSPITAL ENCOUNTER (EMERGENCY)
Age: 40
Discharge: HOME | End: 2020-03-13
Attending: EMERGENCY MEDICINE
Payer: MEDICAID

## 2020-03-13 ENCOUNTER — APPOINTMENT (OUTPATIENT)
Dept: GENERAL RADIOLOGY | Age: 40
End: 2020-03-13
Attending: NURSE PRACTITIONER
Payer: MEDICAID

## 2020-03-13 VITALS
HEART RATE: 92 BPM | BODY MASS INDEX: 34.99 KG/M2 | RESPIRATION RATE: 18 BRPM | OXYGEN SATURATION: 95 % | DIASTOLIC BLOOD PRESSURE: 70 MMHG | WEIGHT: 210 LBS | TEMPERATURE: 98.3 F | SYSTOLIC BLOOD PRESSURE: 120 MMHG | HEIGHT: 65 IN

## 2020-03-13 DIAGNOSIS — M25.522 LEFT ELBOW PAIN: ICD-10-CM
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
## 2020-03-13 DIAGNOSIS — R07.81 RIB PAIN: ICD-10-CM

## 2020-03-13 DIAGNOSIS — R51.9 ACUTE NONINTRACTABLE HEADACHE, UNSPECIFIED HEADACHE TYPE: ICD-10-CM

## 2020-03-13 PROCEDURE — 99283 EMERGENCY DEPT VISIT LOW MDM: CPT

## 2020-03-13 PROCEDURE — 73080 X-RAY EXAM OF ELBOW: CPT

## 2020-03-13 PROCEDURE — 71100 X-RAY EXAM RIBS UNI 2 VIEWS: CPT

## 2020-03-13 PROCEDURE — 74011250637 HC RX REV CODE- 250/637: Performed by: NURSE PRACTITIONER

## 2020-03-13 RX ORDER — CYCLOBENZAPRINE HCL 5 MG
5 TABLET ORAL
Qty: 3 TAB | Refills: 0 | Status: SHIPPED | OUTPATIENT
Start: 2020-03-13 | End: 2020-03-16

## 2020-03-13 RX ORDER — LORAZEPAM 1 MG/1
0.5 TABLET ORAL
Status: COMPLETED | OUTPATIENT
Start: 2020-03-13 | End: 2020-03-13

## 2020-03-13 RX ORDER — ACETAMINOPHEN 325 MG/1
975 TABLET ORAL
Status: COMPLETED | OUTPATIENT
Start: 2020-03-13 | End: 2020-03-13

## 2020-03-13 RX ADMIN — LORAZEPAM 0.5 MG: 1 TABLET ORAL at 13:46

## 2020-03-13 RX ADMIN — ACETAMINOPHEN 975 MG: 325 TABLET ORAL at 13:46
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
## 2020-03-13 NOTE — ED NOTES
I have reviewed discharge instructions with the patient. The patient verbalized understanding. Patient armband removed and shredded. Pt discharged accompanied by adult female who states she is driving pt home. Pt was instructed not to drive after taking Ativan. She verbalized understanding.
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
## 2020-03-16 ENCOUNTER — APPOINTMENT (OUTPATIENT)
Dept: GENERAL RADIOLOGY | Age: 40
End: 2020-03-16
Attending: EMERGENCY MEDICINE
Payer: MEDICAID

## 2020-03-16 ENCOUNTER — HOSPITAL ENCOUNTER (EMERGENCY)
Age: 40
Discharge: HOME | End: 2020-03-16
Attending: EMERGENCY MEDICINE
Payer: MEDICAID

## 2020-03-16 VITALS
SYSTOLIC BLOOD PRESSURE: 123 MMHG | DIASTOLIC BLOOD PRESSURE: 65 MMHG | BODY MASS INDEX: 34.99 KG/M2 | RESPIRATION RATE: 16 BRPM | TEMPERATURE: 98.7 F | HEART RATE: 87 BPM | OXYGEN SATURATION: 97 % | WEIGHT: 210 LBS | HEIGHT: 65 IN

## 2020-03-16 DIAGNOSIS — R07.89 CHEST WALL PAIN: Primary | ICD-10-CM

## 2020-03-16 LAB
ATRIAL RATE: 87 BPM
CALCULATED P AXIS, ECG09: 52 DEGREES
CALCULATED R AXIS, ECG10: 77 DEGREES
CALCULATED T AXIS, ECG11: 52 DEGREES
DIAGNOSIS, 93000: NORMAL
P-R INTERVAL, ECG05: 156 MS
Q-T INTERVAL, ECG07: 360 MS
QRS DURATION, ECG06: 74 MS
QTC CALCULATION (BEZET), ECG08: 433 MS
VENTRICULAR RATE, ECG03: 87 BPM

## 2020-03-16 PROCEDURE — 73080 X-RAY EXAM OF ELBOW: CPT

## 2020-03-16 PROCEDURE — 99283 EMERGENCY DEPT VISIT LOW MDM: CPT

## 2020-03-16 PROCEDURE — 96372 THER/PROPH/DIAG INJ SC/IM: CPT

## 2020-03-16 PROCEDURE — 71046 X-RAY EXAM CHEST 2 VIEWS: CPT

## 2020-03-16 PROCEDURE — 93005 ELECTROCARDIOGRAM TRACING: CPT

## 2020-03-16 PROCEDURE — 74011250636 HC RX REV CODE- 250/636: Performed by: EMERGENCY MEDICINE

## 2020-03-16 RX ORDER — NAPROXEN 250 MG/1
250 TABLET ORAL
Qty: 6 TAB | Refills: 0 | Status: SHIPPED | OUTPATIENT
Start: 2020-03-16 | End: 2020-03-18

## 2020-03-16 RX ORDER — OXYCODONE AND ACETAMINOPHEN 5; 325 MG/1; MG/1
1 TABLET ORAL
Qty: 12 TAB | Refills: 0 | Status: SHIPPED | OUTPATIENT
Start: 2020-03-16 | End: 2020-03-19

## 2020-03-16 RX ORDER — KETOROLAC TROMETHAMINE 15 MG/ML
15 INJECTION, SOLUTION INTRAMUSCULAR; INTRAVENOUS
Status: COMPLETED | OUTPATIENT
Start: 2020-03-16 | End: 2020-03-16

## 2020-03-16 RX ADMIN — KETOROLAC TROMETHAMINE 15 MG: 15 INJECTION, SOLUTION INTRAMUSCULAR; INTRAVENOUS at 09:55
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
## 2020-03-16 NOTE — DISCHARGE INSTRUCTIONS

## 2020-03-18 ENCOUNTER — PATIENT OUTREACH (OUTPATIENT)
Dept: FAMILY MEDICINE CLINIC | Age: 40
End: 2020-03-18
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
## 2020-03-19 ENCOUNTER — TELEPHONE (OUTPATIENT)
Dept: FAMILY MEDICINE CLINIC | Age: 40
End: 2020-03-19

## 2020-03-19 RX ORDER — NAPROXEN 500 MG/1
500 TABLET ORAL 2 TIMES DAILY WITH MEALS
Qty: 30 TAB | Refills: 0 | Status: SHIPPED | OUTPATIENT
Start: 2020-03-19 | End: 2021-11-18 | Stop reason: ALTCHOICE

## 2020-03-19 NOTE — TELEPHONE ENCOUNTER
Spoke with [facility] TREY HARRINGTON who confirmed both prescriptions were filled on Monday 03-. She still has some Percocet 5-325 mg tablets left (but will not last her through the week). But she is completely out of Naprosyn 250 mg.  Please advise

## 2020-03-19 NOTE — TELEPHONE ENCOUNTER
Naprosyn refilled at a higher dose.  I cannot refill her Percocet so I would encourage her to use that sparingly only for pain 7/10 or greater
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
## 2020-03-19 NOTE — TELEPHONE ENCOUNTER
Place call to Sandra Medina to inform her that Ellaree Holstein has refilled her Naprosyn at a higher dose (500 mg one tab to be taken twice a day). But she cannot refill her Percocet so she  would encourage her to use that sparingly only for pain 7/10 or greater. Caril voice understanding.

## 2020-04-06 ENCOUNTER — PATIENT OUTREACH (OUTPATIENT)
Dept: CASE MANAGEMENT | Age: 40
End: 2020-04-06

## 2020-04-06 NOTE — PROGRESS NOTES
Patient resolved from Transition of Care episode on 4-6-2020. Patient/family has been provided the following resources and education related to COVID-19:                         Signs, symptoms and red flags related to COVID-19            CDC exposure and quarantine guidelines            [facility] exposure contact - [phone]  Patient declined offer for [employer] exposure hotline phone number. Patient referred to PCP and /or local/[facility] for any furthe concerns or questions. Patient currently reports that   symptoms have improved:     Patient states:, \" Everything is good\"     No further outreach scheduled with this CTN/ACM. Episode of Care resolved. Patient has this CTN/ACM contact information if future needs arise.

## 2020-06-09 ENCOUNTER — TELEPHONE (OUTPATIENT)
Dept: FAMILY MEDICINE CLINIC | Age: 40
End: 2020-06-09

## 2020-06-09 ENCOUNTER — CLINICAL SUPPORT (OUTPATIENT)
Dept: FAMILY MEDICINE CLINIC | Age: 40
End: 2020-06-09

## 2020-06-09 DIAGNOSIS — Z11.1 PPD SCREENING TEST: Primary | ICD-10-CM

## 2020-06-09 NOTE — PROGRESS NOTES
PPD Placement note    Arthur Mccoy, 41 y.o. female is here today for placement of PPD test.  Tuberculin skin test applied to left ventral forearm. Explained how to read the test, measuring induration not just erythema; she will come into office early if test appears positive. Reason for PPD test: work    Pt has taken PPD test before. Patient  has verified they are not allergic to the products PPD is made of (Phenol or Tween). The patient denies cough. The patient is not taking any oral or IV steroid medication now, and has not taken any within the last month? Has the patient ever received the BCG vaccine?: no  Has the patient been in recent contact with anyone known or suspected of having active TB disease?: no      O: Alert and oriented in NAD. P:  PPD placed on 6/9/2020. At 10:30 am  Patient advised to return for reading within 48-72 hours.     PPD 0.1 ml given in left forearm   Lot # [id number]  Exp date 06/10/2022

## 2020-06-09 NOTE — TELEPHONE ENCOUNTER
Have you been diagnosed with, tested for, or told that you are suspected of having COVID-19 (coronovirus)? Have you had a fever or taken medication to treat a fever in the past 72 hours? Have you had a cough, SOB, or flu-like symptoms within the past 3 days? Have you had direct contact with someone who tested positive for COVID-19 within the past 14 days? Do you have a household member with flu-like symptoms, including fever, cough, or SOB? Do you currently have flu-like symptoms including fever, cough, or SOB?         The ANSWERS TO ALL OF THE QUESTIONS IS NO.

## 2020-06-09 NOTE — PATIENT INSTRUCTIONS
Tuberculin Skin Test: Care Instructions What is it? The tuberculosis (TB) skin test can tell if you have TB bacteria in your body. Tuberculosis (TB) is a bacterial infection that can damage the lungs or other parts of the body. Many people are exposed to TB and test positive for TB bacteria in their bodies, but they don't get the disease. TB bacteria can stay in your body without making you sick. This is because your immune system can keep TB in check. Why is the test done? Your doctor may want you to have this test if you have been in close contact with someone who has tuberculosis (TB). You may also have the test if you have symptoms that might be causing TB. These include a cough that doesn't go away and unexplained weight loss. How do you prepare for the test? 
In general, there's nothing you have to do before this test, unless your doctor tells you to. How is the test done? For a tuberculin skin test, you sit down and turn the inner side of your forearm up. The skin where the test is done is cleaned and allowed to dry. A small shot of the tuberculosis antigen (purified protein derivative, or PPD) is put under the top layer of skin. The fluid makes a little bump (wheal) under the skin. A [place] may be drawn around the test area with a pen. What happens after the test? 
· Do not cover the site with a bandage. · You must see your doctor 2 to 3 days after the test to have the skin test checked. If you have TB in your body, a firm red bump will form at the shot site within 2 days. · If the test shows that you are infected with TB (positive), your doctor probably will order more tests. A TB-positive skin test can't tell when you became infected with TB. And it can't tell whether the infection can be passed to others. How can you care for yourself at home? · Do not scratch the test site. Scratching it may cause redness or swelling. This could affect the test results. · To ease itching, put a cold washcloth on the site. Then pat the site dry. · Do not cover the test site with a bandage or other dressing. Follow-up care is a key part of your treatment and safety. Be sure to make and go to all appointments, and call your doctor if you are having problems. Ask your doctor when you can expect to have your test results. Where can you learn more? Go to http://tommie-nicole.info/ Enter [phone] in the search box to learn more about \"Tuberculin Skin Test: Care Instructions. \" Current as of: February 11, 2020               Content Version: 12.5 © 6160-0976 [employer]. Care instructions adapted under license by [employer] (which disclaims liability or warranty for this information). If you have questions about a medical condition or this instruction, always ask your healthcare professional. [address] any warranty or liability for your use of this information.

## 2020-06-11 ENCOUNTER — CLINICAL SUPPORT (OUTPATIENT)
Dept: FAMILY MEDICINE CLINIC | Age: 40
End: 2020-06-11

## 2020-06-11 DIAGNOSIS — Z11.1 PPD SCREENING TEST: Primary | ICD-10-CM

## 2020-06-11 LAB
MM INDURATION POC: 0 MM (ref 0–5)
PPD POC: NEGATIVE NEGATIVE

## 2020-08-03 ENCOUNTER — TELEPHONE (OUTPATIENT)
Dept: INTERNAL MEDICINE CLINIC | Age: 40
End: 2020-08-03

## 2020-08-03 NOTE — TELEPHONE ENCOUNTER
Yes I will see her. She last saw Sharyle Mosher in Feb. Schedule her for Wed 8/5 at 10am if this is doable for her.  Thank you

## 2020-08-03 NOTE — TELEPHONE ENCOUNTER
Pt asking if you will be her pcp. He boyfriend is former pt of Dr Brook Jacobsen and is scheduled to see you in a vitrual appt this week.      She stated she also has family members that come to our office for pcp

## 2020-08-06 ENCOUNTER — HOSPITAL ENCOUNTER (OUTPATIENT)
Dept: ULTRASOUND IMAGING | Age: 40
Discharge: HOME | End: 2020-08-06
Attending: OBSTETRICS & GYNECOLOGY
Payer: MEDICAID

## 2020-08-06 ENCOUNTER — HOSPITAL ENCOUNTER (OUTPATIENT)
Dept: MAMMOGRAPHY | Age: 40
Discharge: HOME | End: 2020-08-06
Attending: OBSTETRICS & GYNECOLOGY
Payer: MEDICAID

## 2020-08-06 ENCOUNTER — TELEPHONE (OUTPATIENT)
Dept: INTERNAL MEDICINE CLINIC | Age: 40
End: 2020-08-06

## 2020-08-06 DIAGNOSIS — R92.8 ABNORMAL MAMMOGRAM: ICD-10-CM

## 2020-08-06 PROCEDURE — 77061 BREAST TOMOSYNTHESIS UNI: CPT

## 2020-08-06 PROCEDURE — 76642 ULTRASOUND BREAST LIMITED: CPT

## 2020-08-11 ENCOUNTER — VIRTUAL VISIT (OUTPATIENT)
Dept: INTERNAL MEDICINE CLINIC | Age: 40
End: 2020-08-11

## 2020-08-11 DIAGNOSIS — Z79.899 MEDICATION MANAGEMENT: ICD-10-CM

## 2020-08-11 DIAGNOSIS — F31.9 BIPOLAR 1 DISORDER (HCC): ICD-10-CM

## 2020-08-11 DIAGNOSIS — E78.1 HYPERTRIGLYCERIDEMIA: ICD-10-CM

## 2020-08-11 DIAGNOSIS — R92.8 ABNORMAL MAMMOGRAM OF RIGHT BREAST: ICD-10-CM

## 2020-08-11 DIAGNOSIS — Z76.89 ENCOUNTER TO ESTABLISH CARE WITH NEW DOCTOR: Primary | ICD-10-CM

## 2020-08-11 DIAGNOSIS — Z87.891 FORMER SMOKER: ICD-10-CM

## 2020-08-11 DIAGNOSIS — E66.01 SEVERE OBESITY (HCC): ICD-10-CM

## 2020-08-11 NOTE — Clinical Note
[address] [facility]   Please call and schedule patient for 6 months follow up. They will need fasting  fasting labs 1 week prior to appt. Lipid    [facility] Nurses  Placed referral and other orders: Yes. Repeat bilateral mammogram diagnostic and right breast ultrasound orders placed for 6-month follow-up. Thank you!

## 2020-08-11 NOTE — PROGRESS NOTES
.  Internist of [id number] [address] Stewart  Declan giron, [address]  [phone] office/[phone] fax      8/11/2020    Consent: Liat Pozo, who was seen by synchronous (real-time) audio-video technology, and/or her healthcare decision maker, is aware that this patient-initiated, Telehealth encounter on 8/11/2020 is a billable service, with coverage as determined by her insurance carrier. She is aware that she may receive a bill and has provided verbal consent to proceed: Yes. Patient: Liat Pozo, 1980, [id number]       HPI: Liat Pozo, a 41 y.o. female, who presents virtually to establish care. Pt lives with boyfriend , has 0 children, and is employed as [employer]. Current medications: BuSpar 10 mg 5 tablets daily, Klonopin 0.5 mg - 1 mg 3 times daily as needed, Lamictal 100 mg nightly, Naprosyn 500 mg twice daily as needed, Minipress 2 mg nightly, Seroquel 100 mg nightly, Effexor 150 mg daily, Geodon 20 mg twice daily. Patient states on Friday she is having the deco Provera shots DC'd and Nexplanon inserted. Pts main concern today: Establish care with new provider, weight gain. Weight gain:  Patient states that she has changed her diet and has started exercising but is not losing any weight. She states her weight is starting to cause breathing issues and at times chest pains along with shortness of breath when up and moving for long periods of time. She believes her psychotropic medications may be the cause of her weight gain. She has cut back on her Seroquel from 300 mg nightly to 100 mg nightly as she realizes Seroquel does cause weight gain. She denies initiating weight watchers but states she will look into this program.    COPD:   Patient has a follow-up appointment with Dr. Darlene Macario August 24, 2020 for her COPD. She is currently not needing any inhalers as she is able to manage without.     Bipolar/anxiety/depression:  Patient follows up with Suraj Villanueva psychiatrist at [facility] psychiatry. Dr. Brian Rivera prescribes all psychotropic drugs. Past Medical History:   Diagnosis Date    Asthma     Bipolar 1 disorder ([facility] [address]     [facility]    Blepharitis     COPD (chronic obstructive pulmonary disease) ([facility] [address]     Dr Josseline Hodges, NP    Genital herpes     Genital HSV     GERD (gastroesophageal reflux disease) 2017    IBS (irritable bowel syndrome)     Numerous moles 05/10/2018    Dr Alicia Nassar, [facility], Derm    OAB (overactive bladder)     Onychomycosis     was on lamisol for this 3 mos ago. Was on it for 6 mos.  PMDD (premenstrual dysphoric disorder) 5/10/2018    Psychiatric disorder     Reactive airway disease     Tinea pedis        Past Surgical History:   Procedure Laterality Date    HX COLONOSCOPY      age 22 for diarrhea    HX CYST REMOVAL      hand    HX GYN      HX ORTHOPAEDIC      right hand    HX OVARIAN CYST REMOVAL      right ovary       Social History     Socioeconomic History    Marital status: SINGLE     Spouse name: Not on file    Number of children: Not on file    Years of education: Not on file    Highest education level: Not on file   Occupational History    Not on file   Social Needs    Financial resource strain: Not on file    Food insecurity     Worry: Not on file     Inability: Not on file    Transportation needs     Medical: Not on file     Non-medical: Not on file   Tobacco Use    Smoking status: Former Smoker     Packs/day: 1.50     Years: 23.00     Pack years: 34.50     Types: Cigarettes     Start date: 1992     Last attempt to quit: 2019     Years since quittin.0    Smokeless tobacco: Never Used   Substance and Sexual Activity    Alcohol use:  Yes     Alcohol/week: 2.0 standard drinks     Types: 2 Glasses of wine per week     Frequency: 2-4 times a month     Drinks per session: 1 or 2     Binge frequency: Less than monthly     Comment: 2x week and on weekends: 1-6 drinks/sitting    Drug use: No     Types: Prescription, OTC    Sexual activity: Yes     Partners: Female     Comment: condoms   Lifestyle    Physical activity     Days per week: Not on file     Minutes per session: Not on file    Stress: Not on file   Relationships    Social connections     Talks on phone: Not on file     Gets together: Not on file     Attends Restoration service: Not on file     Active member of club or organization: Not on file     Attends meetings of clubs or organizations: Not on file     Relationship status: Not on file    Intimate partner violence     Fear of current or ex partner: Not on file     Emotionally abused: Not on file     Physically abused: Not on file     Forced sexual activity: Not on file   Other Topics Concern    Not on file   Social History Narrative    Not on file       Allergies   Allergen Reactions    Prozac [Fluoxetine] Unknown (comments) and Other (comments)       Current Outpatient Medications on File Prior to Visit   Medication Sig Dispense Refill    naproxen (NAPROSYN) 500 mg tablet Take 1 Tab by mouth two (2) times daily (with meals). 30 Tab 0    venlafaxine-SR (EFFEXOR-XR) 150 mg capsule Take 1 Cap by mouth daily.  busPIRone (BUSPAR) 10 mg tablet Take 50 mg by mouth daily.  [DISCONTINUED] tretinoin (RETIN-A) 0.025 % topical cream       prazosin (MINIPRESS) 2 mg capsule       medroxyPROGESTERone (DEPO-PROVERA) 150 mg/mL syrg INJECT 1 ML EVERY 3 MONTHS BY INTRAMUSCULAR ROUTE.  3    ziprasidone (GEODON) 20 mg capsule Take 20 mg by mouth two (2) times a day.  lamoTRIgine (LAMICTAL) 100 mg tablet Take 100 mg by mouth nightly.  clonazePAM (KLONOPIN) 0.5 mg tablet Take 1 mg by mouth three (3) times daily as needed.  QUEtiapine (SEROQUEL) 50 mg tablet Take 50 mg by mouth two (2) times a day. No current facility-administered medications on file prior to visit.            Objective:   Review of Systems   Constitutional: Negative for fever, malaise/fatigue and weight loss. Weight gain. HENT: Negative for congestion, ear pain, hearing loss, sinus pain and sore throat. Eyes: Negative for blurred vision, discharge and redness. Respiratory: Negative for cough, shortness of breath (With much exertion.) and wheezing. Cardiovascular: Negative for chest pain, palpitations and leg swelling. Gastrointestinal: Negative for abdominal pain, constipation, diarrhea, heartburn, nausea and vomiting. Genitourinary: Negative for dysuria, flank pain, frequency, hematuria and urgency. Had repeat mammogram done last week. Musculoskeletal: Negative for back pain, falls, joint pain, myalgias and neck pain. Skin: Negative for rash. Neurological: Negative for dizziness, tingling, speech change, seizures, weakness and headaches. Endo/Heme/Allergies: Negative for environmental allergies. Does not bruise/bleed easily. Psychiatric/Behavioral: Negative for depression, memory loss, substance abuse and suicidal ideas. The patient is not nervous/anxious and does not have insomnia. Vital Signs: (As obtained by patient/caregiver at home)  There were no vitals taken for this visit. PHYSICAL EXAMINATION:  [ INSTRUCTIONS:  \"[x]\" Indicates a positive item  \"[]\" Indicates a negative item  -- DELETE ALL ITEMS NOT EXAMINED]      Constitutional: [x] Appears well-developed and well-nourished [x] No apparent distress      [x] Abnormal - morbidly obese    Mental status: [x] Alert and awake  [x] Oriented to person/place/time [x] Able to follow commands    [] Abnormal -     Eyes:   EOM    [x]  Normal    [] Abnormal -   Sclera  [x]  Normal    [] Abnormal -          Discharge [x]  None visible   [] Abnormal -     HENT, Neck: [x] Normocephalic, atraumatic  [] Abnormal - .   [x] Mouth/Throat: Mucous membranes are moist    Pulmonary/Chest: [x] Respiratory effort normal   [x] No visualized signs of difficulty breathing or respiratory distress        [] Abnormal -      Musculoskeletal:   [x] Normal gait with no signs of ataxia         [x] Normal range of motion of neck        [] Abnormal -     Neurological:        [x] No Facial Asymmetry (Cranial nerve 7 motor function) (limited exam due to video visit)          [x] No gaze palsy        [] Abnormal -          Skin:        [x] No significant exanthematous lesions or discoloration noted on facial skin         [] Abnormal -            Psychiatric:       [x] Normal Affect [] Abnormal -        [x] No Hallucinations        Assessment:    Magdy Fabian who has risk factors including (see above previous medical hx) and:       ICD-10-CM ICD-9-CM    1. Encounter to establish care with new doctor  Z76.89 V65.8    2. Hypertriglyceridemia  E78.1 272.1 LIPID PANEL   3. Abnormal mammogram of right breast  R92.8 793.80 SHREYA MAMMO BI DX INCL CAD      US BREAST RT LIMITED=<3 QUAD   4. Severe obesity ([address]  E66.01 278.01    5. Former smoker  Z87.891 V15.82    6. Bipolar 1 disorder (HCC)  F31.9 296.7    7. Medication management  Z79.899 V58.69        1. Encounter to establish care with new doctor  Patient is a pleasant 75-year-old morbidly obese white female who lives with her boyfriend Houston Kim) and works as a nanny. She suffers from many mental illnesses such as bipolar, depression, anxiety, nightmares. Patient follows up with her psychiatrist as scheduled. Patient had no other complaints besides the ones mentioned above. 2. Abnormal mammogram of right breast  Mammogram on 8/6/2020 remains abnormal.  Placed orders for bilateral diagnostic mammogram and ultrasound of right breast as recommended by radiologist.    - SHREYA MAMMO BI DX INCL CAD; Future  - US BREAST RT LIMITED=<3 QUAD; Future    3.  Severe obesity ([address]  Weight management:  BMI is out of normal parameters and plan is as follows: Discussed in great detail on diet, portion control, exercise, avoiding foods high in sugar, carbs and starches, fatty/greasy foods and to eat until satisfied not til full. I have counseled this patient on diet and exercise regimens as well. Patient verbalized understanding. Strongly encourage patient to initiate a weight loss program and to research weight watchers as this program would probably be the best for her. Also informed her that her psychotropic drugs could be part of her weight gain issues. If she decides she wants to change her psychotropic drugs she needs to speak with her psychiatrist first, as changing medications without provider knowledge may lead to other health issues. Patient verbalized understanding. 4. Former smoker  Patient quit smoking 1 year ago today. 5. Bipolar 1 disorder (HCC)  Continue to follow-up with Dr. Betsey ZUNIGA, [facility], psychiatry. 6. Medication management  Updated patient's medication record. Reviewed medication and completed the medication reconciliation with the patient. Reviewed side effects of medications with the patient. Questions were answered and patient verb understanding. 7. Hypertriglyceridemia  Noted patient's triglycerides to be elevated in January 2020. Elevated triglycerides could be due to diet, lifestyle, psychotropic medications. Patient to have lipid level drawn 1 week prior to 6-month follow-up. She is currently not on any cholesterol medication.    - LIPID PANEL; Future    Verbal discussion of all information discussed above, pending tests ordered and future goals/plans completed. Patient expressed understanding of current diagnosis, planned testing, follow up and if needed to contact the office for any questions or concerns prior to the next visit.      Plan:       Labs needed this week: No      Orders Placed This Encounter    SHREYA MAMMO BI DX INCL CAD     IMPRESSION:     Stable 3 probably benign masses, 1:00 0.6 cm hypoechoic mass, 10:00 1.2 cm  probable septated cyst, 10:00 0.8 cm probable cluster of microcysts.      BIRADS 3: Probably benign finding  Management Recommendation: Additional 6 month follow-up right breast ultrasound  limited bilateral diagnostic mammogram January 2021. The findings and  recommendations were discussed with the patient at the time of the exam who is  in agreement. Standing Status:   Future     Standing Expiration Date:   4/11/2021     Order Specific Question:   Is Patient Pregnant? Answer:   No    US BREAST RT LIMITED=<3 QUAD     IMPRESSION:     Stable 3 probably benign masses, 1:00 0.6 cm hypoechoic mass, 10:00 1.2 cm  probable septated cyst, 10:00 0.8 cm probable cluster of microcysts.      BIRADS 3: Probably benign finding  Management Recommendation: Additional 6 month follow-up right breast ultrasound  limited bilateral diagnostic mammogram January 2021. The findings and  recommendations were discussed with the patient at the time of the exam who is  in agreement. Standing Status:   Future     Standing Expiration Date:   4/11/2021     Order Specific Question:   Is Patient Pregnant? Answer:   No    LIPID PANEL     Standing Status:   Future     Standing Expiration Date:   4/30/2021     Current Outpatient Medications   Medication Sig Dispense Refill    naproxen (NAPROSYN) 500 mg tablet Take 1 Tab by mouth two (2) times daily (with meals). 30 Tab 0    venlafaxine-SR (EFFEXOR-XR) 150 mg capsule Take 1 Cap by mouth daily.  busPIRone (BUSPAR) 10 mg tablet Take 50 mg by mouth daily.  prazosin (MINIPRESS) 2 mg capsule       medroxyPROGESTERone (DEPO-PROVERA) 150 mg/mL syrg INJECT 1 ML EVERY 3 MONTHS BY INTRAMUSCULAR ROUTE.  3    ziprasidone (GEODON) 20 mg capsule Take 20 mg by mouth two (2) times a day.  lamoTRIgine (LAMICTAL) 100 mg tablet Take 100 mg by mouth nightly.  clonazePAM (KLONOPIN) 0.5 mg tablet Take 1 mg by mouth three (3) times daily as needed.  QUEtiapine (SEROQUEL) 50 mg tablet Take 50 mg by mouth two (2) times a day.          Follow-up and Dispositions    · Return in about 6 months (around 2/11/2021) [facility] [address] [facility]   Please call and schedule patient for 6 months follow up. They will need fasting  fasting labs 1 week prior to appt. Lipid    [facility]  Placed referral and other orders: Yes. Repeat bilateral mammogram diagnostic and right breast ultrasound orders placed for 6-month follow-up. Thank you! Dr. Mary Wong. SILVANO Hightower, [employer]  [facility]      I spent at least 25 minutes with this established patient, and >50% of the time was spent counseling and/or coordinating care regarding Establish care, medication management, HM.

## 2020-08-12 ENCOUNTER — TELEPHONE (OUTPATIENT)
Dept: INTERNAL MEDICINE CLINIC | Age: 40
End: 2020-08-12

## 2020-08-12 NOTE — TELEPHONE ENCOUNTER
Katarzyna Hightower DNP  [facility] Front Office; [facility]               [facility]    Please call and schedule patient for 6 months follow up. They will need fasting  fasting labs 1 week prior to appt.      Lipid

## 2020-08-24 ENCOUNTER — OFFICE VISIT (OUTPATIENT)
Dept: PULMONOLOGY | Age: 40
End: 2020-08-24

## 2020-08-24 VITALS
DIASTOLIC BLOOD PRESSURE: 60 MMHG | RESPIRATION RATE: 20 BRPM | BODY MASS INDEX: 37.29 KG/M2 | TEMPERATURE: 97.8 F | WEIGHT: 223.8 LBS | HEIGHT: 65 IN | HEART RATE: 106 BPM | SYSTOLIC BLOOD PRESSURE: 110 MMHG | OXYGEN SATURATION: 98 %

## 2020-08-24 DIAGNOSIS — Z87.891 HISTORY OF TOBACCO USE: Primary | ICD-10-CM
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
## 2020-08-24 NOTE — PROGRESS NOTES
The pt states she is more SOB due to deconditioning. She is having anxiety and has a short crying spell while we review the questions.

## 2020-10-06 ENCOUNTER — TELEPHONE (OUTPATIENT)
Dept: INTERNAL MEDICINE CLINIC | Age: 40
End: 2020-10-06

## 2020-10-06 NOTE — TELEPHONE ENCOUNTER
Pt has a sched appt on Friday  For an er f/up - she is calling asking to be seen sooner because she now has an infected finger from a hang nail and thinks she needs an antibiotic ,  Can she be seen for all this at one appt ?

## 2020-10-06 NOTE — TELEPHONE ENCOUNTER
Pt added tomorrow. Wanted to keep appt Friday for right now. Checking with her boss to see if she can get off.  She will call back

## 2020-10-06 NOTE — TELEPHONE ENCOUNTER
I am in [facility] on Wed and Thursday mornings if she would like to schedule a er/finger pain follow up and cancel her Friday appt.

## 2020-10-07 ENCOUNTER — OFFICE VISIT (OUTPATIENT)
Dept: INTERNAL MEDICINE CLINIC | Age: 40
End: 2020-10-07
Payer: MEDICAID

## 2020-10-07 VITALS
HEIGHT: 65 IN | BODY MASS INDEX: 37.15 KG/M2 | TEMPERATURE: 98.8 F | WEIGHT: 223 LBS | OXYGEN SATURATION: 96 % | RESPIRATION RATE: 12 BRPM | SYSTOLIC BLOOD PRESSURE: 128 MMHG | DIASTOLIC BLOOD PRESSURE: 71 MMHG | HEART RATE: 94 BPM

## 2020-10-07 DIAGNOSIS — Z23 NEEDS FLU SHOT: ICD-10-CM

## 2020-10-07 DIAGNOSIS — T43.615A ADVERSE EFFECT OF CAFFEINE, INITIAL ENCOUNTER: ICD-10-CM

## 2020-10-07 DIAGNOSIS — R94.31 ABNORMAL EKG: ICD-10-CM

## 2020-10-07 DIAGNOSIS — F41.9 ANXIETY DISORDER, UNSPECIFIED TYPE: ICD-10-CM

## 2020-10-07 DIAGNOSIS — R07.9 CHEST PAIN, UNSPECIFIED TYPE: ICD-10-CM

## 2020-10-07 DIAGNOSIS — L03.012 INFECTION OF NAIL BED OF FINGER OF LEFT HAND: Primary | ICD-10-CM

## 2020-10-07 PROCEDURE — 99213 OFFICE O/P EST LOW 20 MIN: CPT | Performed by: NURSE PRACTITIONER

## 2020-10-07 PROCEDURE — 90686 IIV4 VACC NO PRSV 0.5 ML IM: CPT | Performed by: NURSE PRACTITIONER

## 2020-10-07 PROCEDURE — 90471 IMMUNIZATION ADMIN: CPT | Performed by: NURSE PRACTITIONER

## 2020-10-07 RX ORDER — CEPHALEXIN 500 MG/1
500 CAPSULE ORAL 2 TIMES DAILY
Qty: 20 CAP | Refills: 0 | Status: SHIPPED | OUTPATIENT
Start: 2020-10-07 | End: 2020-10-17

## 2020-10-07 NOTE — PATIENT INSTRUCTIONS
Paronychia: Care Instructions Your Care Instructions Paronychia (say \"blly-oe-DV-nickie-uh\") is an infection of the skin around a fingernail or toenail. It happens when germs enter through a break in the skin. The doctor may have made a small cut in the infected area to drain the pus. Most cases of paronychia improve in a few days. But watch your symptoms and follow your doctor's advice. Though rare, a mild case can turn into something more serious and infect your entire finger or toe. Also, it is possible for an infection to return. Follow-up care is a key part of your treatment and safety. Be sure to make and go to all appointments, and call your doctor if you are having problems. It's also a good idea to know your test results and keep a list of the medicines you take. How can you care for yourself at home? · If your doctor told you how to care for your infected nail, follow the doctor's instructions. If you did not get instructions, follow this general advice: 
? Wash the area with clean water 2 times a day. Don't use hydrogen peroxide or alcohol, which can slow healing. ? You may cover the area with a thin layer of petroleum jelly, such as Vaseline, and a nonstick bandage. ? Apply more petroleum jelly and replace the bandage as needed. · If your doctor prescribed antibiotics, take them as directed. Do not stop taking them just because you feel better. You need to take the full course of antibiotics. · Take an over-the-counter pain medicine, such as acetaminophen (Tylenol), ibuprofen (Advil, Motrin), or naproxen (Aleve). Read and follow all instructions on the label. · Do not take two or more pain medicines at the same time unless the doctor told you to. Many pain medicines have acetaminophen, which is Tylenol. Too much acetaminophen (Tylenol) can be harmful. · Prop up the toe or finger so that it is higher than the level of your heart. This will help with pain and swelling. · Apply heat. Put a warm water bottle, heating pad set on low, or warm cloth on your finger or toe. Do not go to sleep with a heating pad on your skin. · Soak the area in warm water twice a day for 15 minutes each time. After soaking, dry the area well and apply a thin layer of petroleum jelly, such as Vaseline. Put on a new bandage. When should you call for help? Call your doctor now or seek immediate medical care if: 
  · You have signs of new or worsening infection, such as: 
? Increased pain, swelling, warmth, or redness. ? Red streaks leading from the infected skin. ? Pus draining from the area. ? A fever. Watch closely for changes in your health, and be sure to contact your doctor if: 
  · You do not get better as expected. Where can you learn more? Go to http://www.collazo.com/ Enter C435 in the search box to learn more about \"Paronychia: Care Instructions. \" Current as of: July 2, 2020               Content Version: 12.6 © 3688-3831 [employer]. Care instructions adapted under license by [employer] (which disclaims liability or warranty for this information). If you have questions about a medical condition or this instruction, always ask your healthcare professional. [address] any warranty or liability for your use of this information. Chest Pain: Care Instructions Your Care Instructions There are many things that can cause chest pain. Some are not serious and will get better on their own in a few days. But some kinds of chest pain need more testing and treatment. Your doctor may have recommended a follow-up visit in the next 8 to 12 hours. If you are not getting better, you may need more tests or treatment. Even though your doctor has released you, you still need to watch for any problems.  The doctor carefully checked you, but sometimes problems can develop later. If you have new symptoms or if your symptoms do not get better, get medical care right away. If you have worse or different chest pain or pressure that lasts more than 5 minutes or you passed out (lost consciousness), call 911 or seek other emergency help right away. A medical visit is only one step in your treatment. Even if you feel better, you still need to do what your doctor recommends, such as going to all suggested follow-up appointments and taking medicines exactly as directed. This will help you recover and help prevent future problems. How can you care for yourself at home? · Rest until you feel better. · Take your medicine exactly as prescribed. Call your doctor if you think you are having a problem with your medicine. · Do not drive after taking a prescription pain medicine. When should you call for help? Call 911 if:  
  · You passed out (lost consciousness).  
  · You have severe difficulty breathing.  
  · You have symptoms of a heart attack. These may include: 
? Chest pain or pressure, or a strange feeling in your chest. 
? Sweating. ? Shortness of breath. ? Nausea or vomiting. ? Pain, pressure, or a strange feeling in your back, neck, jaw, or upper belly or in one or both shoulders or arms. ? Lightheadedness or sudden weakness. ? A fast or irregular heartbeat. After you call 911, the  may tell you to chew 1 adult-strength or 2 to 4 low-dose aspirin. Wait for an ambulance. Do not try to drive yourself. Call your doctor today if:  
  · You have any trouble breathing.  
  · Your chest pain gets worse.  
  · You are dizzy or lightheaded, or you feel like you may faint.  
  · You are not getting better as expected.  
  · You are having new or different chest pain. Where can you learn more? Go to http://www.Syntensia.com/ Enter A120 in the search box to learn more about \"Chest Pain: Care Instructions. \" 
 Current as of: June 26, 2019               Content Version: 12.6 © 4133-8482 [employer]. Care instructions adapted under license by [employer] (which disclaims liability or warranty for this information). If you have questions about a medical condition or this instruction, always ask your healthcare professional. [address] any warranty or liability for your use of this information.
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
## 2021-01-05 ENCOUNTER — OFFICE VISIT (OUTPATIENT)
Dept: CARDIOLOGY CLINIC | Age: 41
End: 2021-01-05
Payer: COMMERCIAL

## 2021-01-05 VITALS
WEIGHT: 224.2 LBS | SYSTOLIC BLOOD PRESSURE: 107 MMHG | TEMPERATURE: 98 F | HEIGHT: 65 IN | BODY MASS INDEX: 37.36 KG/M2 | OXYGEN SATURATION: 97 % | DIASTOLIC BLOOD PRESSURE: 67 MMHG | HEART RATE: 94 BPM

## 2021-01-05 DIAGNOSIS — E66.9 OBESITY (BMI 35.0-39.9 WITHOUT COMORBIDITY): ICD-10-CM

## 2021-01-05 DIAGNOSIS — F41.9 ANXIETY: ICD-10-CM

## 2021-01-05 DIAGNOSIS — R07.9 CHEST PAIN, UNSPECIFIED TYPE: Primary | ICD-10-CM

## 2021-01-05 DIAGNOSIS — R06.02 SOB (SHORTNESS OF BREATH): ICD-10-CM

## 2021-01-05 PROCEDURE — 99204 OFFICE O/P NEW MOD 45 MIN: CPT | Performed by: INTERNAL MEDICINE

## 2021-01-05 PROCEDURE — 93000 ELECTROCARDIOGRAM COMPLETE: CPT | Performed by: INTERNAL MEDICINE

## 2021-01-05 RX ORDER — ETONOGESTREL 68 MG/1
IMPLANT SUBCUTANEOUS
COMMUNITY
Start: 2020-05-05 | End: 2023-05-05

## 2021-01-11 NOTE — PROGRESS NOTES
HISTORY OF PRESENT ILLNESS  Abilio Newberry is a 41 y.o. female. New Patient  The history is provided by the patient. This is a chronic problem. The problem occurs every several days. The problem has not changed since onset. Associated symptoms include chest pain and shortness of breath. Pertinent negatives include no abdominal pain and no headaches. Chest Pain (Angina)   The history is provided by the patient. This is a recurrent problem. The problem has not changed since onset. The problem occurs every several days. The pain is associated with exertion and normal activity. The pain is at a severity of 2/10. The pain is mild. The quality of the pain is described as sharp. The pain does not radiate. Associated symptoms include shortness of breath. Pertinent negatives include no abdominal pain, no claudication, no cough, no diaphoresis, no dizziness, no fever, no headaches, no hemoptysis, no nausea, no orthopnea, no palpitations, no PND, no sputum production, no vomiting and no weakness. Shortness of Breath  The history is provided by the patient. This is a recurrent problem. The problem occurs frequently. The problem has not changed since onset. Associated symptoms include chest pain. Pertinent negatives include no fever, no headaches, no ear pain, no neck pain, no cough, no sputum production, no hemoptysis, no wheezing, no PND, no orthopnea, no vomiting, no abdominal pain, no rash, no leg swelling and no claudication. Review of Systems   Constitutional: Negative for chills, diaphoresis, fever and weight loss. HENT: Negative for ear pain and hearing loss. Eyes: Negative for blurred vision. Respiratory: Positive for shortness of breath. Negative for cough, hemoptysis, sputum production, wheezing and stridor. Cardiovascular: Positive for chest pain. Negative for palpitations, orthopnea, claudication, leg swelling and PND.    Gastrointestinal: Negative for abdominal pain, heartburn, nausea and vomiting. Musculoskeletal: Negative for myalgias and neck pain. Skin: Negative for rash. Neurological: Negative for dizziness, tingling, tremors, focal weakness, loss of consciousness, weakness and headaches. Psychiatric/Behavioral: Negative for depression and suicidal ideas. Family History   Problem Relation Age of Onset   [facility] Migraines Mother    [facility] Stroke Mother     Depression Mother     Hypertension Mother     Diabetes Mother     Thyroid Disease Mother    [facility] Arthritis-rheumatoid Mother     Alcohol abuse Father     Cancer Father         skin cancer    Hypertension Father     Asthma Sister     Diabetes Sister     Heart Disease Sister     Depression Sister     Substance Abuse Sister         tobacco use    Hypertension Sister     Heart Failure Sister     Thyroid Disease Sister     Stroke Maternal Grandmother     Substance Abuse Maternal Grandmother         tobacco use    Breast Cancer Maternal Grandmother     Alzheimer Maternal Grandmother     Diabetes Maternal Grandfather     COPD Maternal Grandfather     Substance Abuse Maternal Grandfather         tobacco use    Substance Abuse Paternal Grandmother         tobacco use    Substance Abuse Paternal Grandfather     Breast Cancer Paternal Aunt     Arthritis Maternal Aunt        Past Medical History:   Diagnosis Date    Asthma     Bipolar 1 disorder ([facility]     Lionel Meacaridad Psych    Blepharitis     COPD (chronic obstructive pulmonary disease) ([facility]     Dr Zahra Shell, NP    Genital herpes     Genital HSV     GERD (gastroesophageal reflux disease) 4/13/2017    IBS (irritable bowel syndrome)     Numerous moles 05/10/2018    Dr Shelbie Starks, [place] [facility], Derm    OAB (overactive bladder)     Onychomycosis     was on lamisol for this 3 mos ago. Was on it for 6 mos.      PMDD (premenstrual dysphoric disorder) 5/10/2018    Psychiatric disorder     Reactive airway disease     Tinea pedis        Past Surgical History:   Procedure Laterality Date    HX COLONOSCOPY      age 22 for diarrhea    HX CYST REMOVAL      hand    HX GYN      HX ORTHOPAEDIC      right hand    HX OVARIAN CYST REMOVAL      right ovary       Social History     Tobacco Use    Smoking status: Former Smoker     Packs/day: 1.50     Years: 23.00     Pack years: 34.50     Types: Cigarettes     Start date: 1992     Quit date: 2019     Years since quittin.4    Smokeless tobacco: Never Used   Substance Use Topics    Alcohol use: Not Currently     Alcohol/week: 2.0 standard drinks     Types: 2 Glasses of wine per week     Frequency: 2-4 times a month     Drinks per session: 1 or 2     Binge frequency: Less than monthly     Comment: 2x week and on weekends: 1-6 drinks/sitting       Allergies   Allergen Reactions    Prozac [Fluoxetine] Unknown (comments) and Other (comments)       Outpatient Medications Marked as Taking for the 21 encounter (Office Visit) with Donnell Ivory MD   Medication Sig Dispense Refill    etonogestreL (Nexplanon) 68 mg impl by SubDERmal route.  naproxen (NAPROSYN) 500 mg tablet Take 1 Tab by mouth two (2) times daily (with meals). (Patient taking differently: Take 500 mg by mouth two (2) times daily (with meals). AS needed) 30 Tab 0    venlafaxine-SR (EFFEXOR-XR) 150 mg capsule Take 1 Cap by mouth daily.  busPIRone (BUSPAR) 10 mg tablet Take 50 mg by mouth daily. Take 3 tabs in the morning and 2 tabs at night.  prazosin (MINIPRESS) 2 mg capsule Take 2 mg by mouth nightly.  ziprasidone (GEODON) 20 mg capsule Take 20 mg by mouth two (2) times a day.  lamoTRIgine (LAMICTAL) 100 mg tablet Take 100 mg by mouth nightly.  clonazePAM (KlonoPIN) 1 mg tablet Take 1 mg by mouth three (3) times daily as needed.  QUEtiapine (SEROQUEL) 50 mg tablet Take 100 mg by mouth at bedtime as directed for dose pack. Take 2 tablets at bedtime.           Visit Vitals  /67 (BP 1 Location: Left arm, BP Patient Position: Sitting)   Pulse 94   Temp 98 °F (36.7 °C) (Temporal)   Ht 5' 4.5\" (1.638 m)   Wt 101.7 kg (224 lb 3.2 oz)   SpO2 97%   BMI 37.89 kg/m²       Physical Exam   Constitutional: She is oriented to person, place, and time. She appears well-developed and well-nourished. HENT:   Head: Normocephalic and atraumatic. Eyes: Conjunctivae and EOM are normal. No scleral icterus. Neck: Normal range of motion. Neck supple. No JVD present. Cardiovascular: Normal rate, regular rhythm and normal heart sounds. Exam reveals no gallop and no friction rub. No murmur heard. Pulmonary/Chest: Effort normal and breath sounds normal. No stridor. No respiratory distress. She has no wheezes. She has no rales. She exhibits no tenderness. Abdominal: She exhibits no distension. There is no abdominal tenderness. Musculoskeletal: Normal range of motion. General: No tenderness or edema. Lymphadenopathy:     She has no cervical adenopathy. Neurological: She is alert and oriented to person, place, and time. No cranial nerve deficit. Skin: No rash noted. No erythema. Psychiatric: She has a normal mood and affect. Her behavior is normal.     ekg sinus rhythm with no acute st-t changes    ASSESSMENT and PLAN    ICD-10-CM ICD-9-CM    1. Chest pain, unspecified type  R07.9 786.50 AMB POC EKG ROUTINE W/ 12 LEADS, INTER & REP      EXERCISE CARDIAC STRESS TEST   2. SOB (shortness of breath)  R06.02 786.05 ECHO ADULT COMPLETE   3. Anxiety  F41.9 300.00    4.  Obesity (BMI 35.0-39.9 without comorbidity)  E66.9 278.00      Orders Placed This Encounter    AMB POC EKG ROUTINE W/ 12 LEADS, INTER & REP     Order Specific Question:   Reason for Exam:     Answer:   Chest pain    ECHO ADULT COMPLETE     Standing Status:   Future     Standing Expiration Date:   7/8/2021     Order Specific Question:   Contrast Enhancement (Bubble Study, Definity, Optison) may be used if criteria listed in established evidence-based protocol has been identified. Answer:   Yes     Order Specific Question:   Is Patient Pregnant? Answer:   Unknown     Follow-up and Dispositions    · Return in about 1 month (around 2/5/2021). current treatment plan is effective, no change in therapy. Patient is a 41 yr old female seen for atypical chest pain. Also c/o dyspnea- with no orthopnea or pnd. Mainly on exertion. Will proceed with stress test and echo. F/u in 1 month.

## 2021-02-11 ENCOUNTER — HOSPITAL ENCOUNTER (OUTPATIENT)
Dept: MAMMOGRAPHY | Age: 41
Discharge: HOME | End: 2021-02-11
Attending: NURSE PRACTITIONER
Payer: COMMERCIAL

## 2021-02-11 ENCOUNTER — HOSPITAL ENCOUNTER (OUTPATIENT)
Dept: ULTRASOUND IMAGING | Age: 41
Discharge: HOME | End: 2021-02-11
Attending: NURSE PRACTITIONER
Payer: COMMERCIAL

## 2021-02-11 DIAGNOSIS — R92.8 ABNORMAL MAMMOGRAM OF RIGHT BREAST: ICD-10-CM

## 2021-02-11 PROCEDURE — 76642 ULTRASOUND BREAST LIMITED: CPT

## 2021-02-11 PROCEDURE — 77062 BREAST TOMOSYNTHESIS BI: CPT

## 2021-02-12 DIAGNOSIS — R92.8 ABNORMAL MAMMOGRAM OF RIGHT BREAST: Primary | ICD-10-CM

## 2021-02-12 DIAGNOSIS — R92.8 ABNORMAL SCREENING MAMMOGRAM: ICD-10-CM

## 2021-02-12 NOTE — PROGRESS NOTES
ICD-10-CM ICD-9-CM    1. Abnormal mammogram of right breast  R92.8 793.80 US BREAST RT LIMITED=<3 QUAD      SHREYA 3D ERIBERTO W MAMMO BI DX INCL CAD   2.  Abnormal screening mammogram  R92.8 793.80 SHREYA 3D ERIBERTO W MAMMO BI DX INCL CAD

## 2021-02-26 ENCOUNTER — TELEPHONE (OUTPATIENT)
Dept: INTERNAL MEDICINE CLINIC | Age: 41
End: 2021-02-26

## 2021-02-26 NOTE — TELEPHONE ENCOUNTER
Patient is asking to speak with a nurse. Stating she stopped drinking coffee last Friday to cut back on her sugar intake. She had been having really bad headaches and dizziness. She is concerned because her Millie Hopes is an ER nurse and said that should have only last 3 days but it has been a week now. It happens mostyl when she is out of breath or when she gets up out of bed.
Patient made aware of msg per Dr. Jale Aguirre.
Patient stated she has stopped drinking coffee since last Friday to cut back on her sugar intake. She states since Friday she has been having bad headaches and dizziness on and off up until now. Please advise.
She is most likely experiencing caffeine withdrawal.  Not to abruptly stop something all at one time as she could have symptoms. She can either take over-the-counter pain relief plus stay hydrated or she can start back on the caffeine and work herself slowly to avoid caffeine withdrawals.   Thank you
Statement Selected

## 2021-08-06 NOTE — LETTER
6/11/2020 2:36 PM 
 
Ms. Lockhart [address] [facility] [id number] PPD 0.1 ml given in left forearm 06/09/2020. PPD read 06/11/2020 Results: Negative 0 mm x 0 mm Sincerely, 
 
 
Claude Huntington, MD 
 

Initial (On Arrival)

## 2021-10-22 RX ORDER — HYDROCORTISONE 25 MG/G
OINTMENT TOPICAL
COMMUNITY

## 2021-10-22 RX ORDER — OXYCODONE AND ACETAMINOPHEN 5; 325 MG/1; MG/1
TABLET ORAL
COMMUNITY
End: 2021-11-18 | Stop reason: ALTCHOICE

## 2021-10-22 RX ORDER — CYCLOBENZAPRINE HCL 5 MG
TABLET ORAL
COMMUNITY

## 2021-10-22 RX ORDER — DOXYCYCLINE 100 MG/1
CAPSULE ORAL
COMMUNITY
End: 2021-11-18 | Stop reason: ALTCHOICE

## 2021-10-22 RX ORDER — DEXTROAMPHETAMINE SACCHARATE, AMPHETAMINE ASPARTATE, DEXTROAMPHETAMINE SULFATE AND AMPHETAMINE SULFATE 5; 5; 5; 5 MG/1; MG/1; MG/1; MG/1
TABLET ORAL
COMMUNITY

## 2021-10-22 RX ORDER — CEPHALEXIN 500 MG/1
CAPSULE ORAL
COMMUNITY
End: 2021-11-18 | Stop reason: ALTCHOICE

## 2021-11-18 ENCOUNTER — OFFICE VISIT (OUTPATIENT)
Dept: PULMONOLOGY | Age: 41
End: 2021-11-18
Payer: COMMERCIAL

## 2021-11-18 VITALS
RESPIRATION RATE: 14 BRPM | BODY MASS INDEX: 36.32 KG/M2 | SYSTOLIC BLOOD PRESSURE: 112 MMHG | DIASTOLIC BLOOD PRESSURE: 66 MMHG | HEART RATE: 92 BPM | HEIGHT: 65 IN | OXYGEN SATURATION: 96 % | WEIGHT: 218 LBS | TEMPERATURE: 97.2 F

## 2021-11-18 DIAGNOSIS — Z87.891 HISTORY OF TOBACCO ABUSE: ICD-10-CM

## 2021-11-18 DIAGNOSIS — R06.09 DYSPNEA ON EXERTION: Primary | ICD-10-CM

## 2021-11-18 DIAGNOSIS — R06.83 SNORING: ICD-10-CM

## 2021-11-18 DIAGNOSIS — G47.19 EXCESSIVE DAYTIME SLEEPINESS: ICD-10-CM

## 2021-11-18 DIAGNOSIS — R06.81 WITNESSED EPISODE OF APNEA: ICD-10-CM

## 2021-11-18 PROCEDURE — 99204 OFFICE O/P NEW MOD 45 MIN: CPT | Performed by: INTERNAL MEDICINE

## 2021-11-18 RX ORDER — ALBUTEROL SULFATE 90 UG/1
2 AEROSOL, METERED RESPIRATORY (INHALATION)
Qty: 1 EACH | Refills: 4 | Status: SHIPPED | OUTPATIENT
Start: 2021-11-18

## 2021-11-18 RX ORDER — TOPIRAMATE 100 MG/1
TABLET, FILM COATED ORAL
COMMUNITY
Start: 2021-11-04

## 2021-11-18 NOTE — PROGRESS NOTES
[facility]                                                       Pulmonary, Critical Care, and Sleep Medicine      Pulmonary Office Progress    Name: Jia Aguilar     : 1980     Date: 2021        Subjective:   Patient has been referred for evaluation of: COPD  Patient is a 35 y.o. female with a PMHx of COPD/Asthma, GERD, Celiac disease, Bipolar disorder, morbid obesity, Anxiety disorder and alcohol abuse. She is a former patient of Dr. Bryson Adan and was last seen on 20 for her cough and nicotine dependence. Today in clinic, she states she is doing relatively well overall. She reports dyspnea on exertion especially with walking up steps. She reports onset of dyspnea after walking up her to her 3rd floor apartment. It takes her 3 minutes to catch her breath after sitting down. She states her symptoms have gotten better since quiting smoking. She also reports weight gain of 30 pounds since stopping cigarette use. Patient has a history of several episodes of bronchitis necessitating multiple hospital visits. She has a history of Asthma for which she is not currently on any inhaler therapy. States her symptoms are mostly triggered by smoke. 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 She herself reports some post-nasal drip with intermittent cough. Also reports some soreness in her left ear. This have been present for 2-3 weeks. Using Advil OTC and nyquil. Sleep - states she has bad sleep apnea which is disruptive to her neighbors. She reports history of night terrors and crying. She has not had a sleep study in the past. Currently on Prazosin. She is following with Psychiatry. Of note, she admits to family h/o mom with RA. Sister & niece with POTS. Tobacco: 2ppd x26 years; quit in 2019. Occupation: [employer]; retail at [employer].    Marital status: single  Travel: none  Pets: none  TB exposure/testing: none  VTE/DVT history: none  : none  Hobbies: crafting - card making; succulents; scrap booking, painting - acrylics  Other exposures: none    Past Medical History:   Diagnosis Date    Asthma     [facility]     Lucrecia Frias Psych    Blepharitis     COPD (chronic obstructive pulmonary disease) ([facility]     Dr Jeanmarie Toledo, NP    Genital herpes     Genital HSV     GERD (gastroesophageal reflux disease) 2017    IBS (irritable bowel syndrome)     Numerous moles 05/10/2018    Dr Jeff Lockhart, [place] [facility], Derm    OAB (overactive bladder)     Onychomycosis     was on lamisol for this 3 mos ago. Was on it for 6 mos.      PMDD (premenstrual dysphoric disorder) 5/10/2018    Psychiatric disorder     Reactive airway disease     Tinea pedis        Past Surgical History:   Procedure Laterality Date    HX COLONOSCOPY      age 22 for diarrhea    HX CYST REMOVAL      hand    HX GYN      HX ORTHOPAEDIC      right hand    HX OVARIAN CYST REMOVAL      right ovary       Social History     Socioeconomic History    Marital status: SINGLE   Tobacco Use    Smoking status: Former Smoker     Packs/day: 1.50     Years: 23.00     Pack years: 34.50     Types: Cigarettes     Start date: 1992     Quit date: 2019     Years since quittin.2    Smokeless tobacco: Never Used   Vaping Use    Vaping Use: Never used   Substance and Sexual Activity    Alcohol use: Not Currently     Alcohol/week: 2.0 standard drinks     Types: 2 Glasses of wine per week     Comment: 2x week and on weekends: 1-6 drinks/sitting    Drug use: No     Types: Prescription, OTC    Sexual activity: Yes     Partners: Female     Comment: condoms       Family History   Problem Relation Age of Onset   [employer] Migraines Mother     Stroke Mother     Depression Mother     Hypertension Mother     Diabetes Mother     Thyroid Disease Mother     Arthritis-rheumatoid Mother  Alcohol abuse Father     Cancer Father         skin cancer    Hypertension Father     Asthma Sister     Diabetes Sister     Heart Disease Sister     Depression Sister     Substance Abuse Sister         tobacco use    Hypertension Sister     Heart Failure Sister     Thyroid Disease Sister     Stroke Maternal Grandmother     Substance Abuse Maternal Grandmother         tobacco use    Breast Cancer Maternal Grandmother     Alzheimer's Disease Maternal Grandmother     Diabetes Maternal Grandfather     COPD Maternal Grandfather     Substance Abuse Maternal Grandfather         tobacco use    Substance Abuse Paternal Grandmother         tobacco use    Substance Abuse Paternal Grandfather     Breast Cancer Paternal Aunt     Arthritis Maternal Aunt        Allergies   Allergen Reactions    Prozac [Fluoxetine] Unknown (comments) and Other (comments)       Current Outpatient Medications   Medication Sig Dispense Refill    topiramate (TOPAMAX) 100 mg tablet       cyclobenzaprine (FLEXERIL) 5 mg tablet cyclobenzaprine 5 mg tablet      dextroamphetamine-amphetamine (ADDERALL) 20 mg tablet dextroamphetamine-amphetamine 20 mg tablet   TAKE 1 TABLET BY MOUTH 3 TIMES A DAY      hydrocortisone (HYTONE) 2.5 % ointment hydrocortisone 2.5 % topical ointment      etonogestreL (Nexplanon) 68 mg impl by SubDERmal route.  venlafaxine-SR (EFFEXOR-XR) 150 mg capsule Take 1 Cap by mouth daily.  busPIRone (BUSPAR) 10 mg tablet Take 50 mg by mouth daily. Take 3 tabs in the morning and 2 tabs at night.  prazosin (MINIPRESS) 2 mg capsule Take 2 mg by mouth nightly.  ziprasidone (GEODON) 20 mg capsule Take 20 mg by mouth two (2) times a day.  lamoTRIgine (LAMICTAL) 100 mg tablet Take 100 mg by mouth nightly.  clonazePAM (KlonoPIN) 1 mg tablet Take 1 mg by mouth three (3) times daily as needed.       QUEtiapine (SEROQUEL) 50 mg tablet Take 100 mg by mouth at bedtime as directed for dose pack. Take 2 tablets at bedtime.  cephALEXin (KEFLEX) 500 mg capsule cephalexin 500 mg capsule (Patient not taking: Reported on 11/18/2021)      doxycycline (MONODOX) 100 mg capsule doxycycline monohydrate 100 mg capsule (Patient not taking: Reported on 11/18/2021)      oxyCODONE-acetaminophen (PERCOCET) 5-325 mg per tablet oxycodone-acetaminophen 5 mg-325 mg tablet (Patient not taking: Reported on 11/18/2021)      naproxen (NAPROSYN) 500 mg tablet Take 1 Tab by mouth two (2) times daily (with meals). (Patient not taking: Reported on 11/18/2021) 30 Tab 0    medroxyPROGESTERone (DEPO-PROVERA) 150 mg/mL syrg INJECT 1 ML EVERY 3 MONTHS BY INTRAMUSCULAR ROUTE. (Patient not taking: Reported on 11/18/2021)  3       Review of Systems:  HEENT: No epistaxis, no nasal drainage, no difficulty in swallowing, no redness in eyes  Respiratory: as above  Cardiovascular: no chest pain, no palpitations, no chronic leg edema, no syncope  Gastrointestinal: no abd pain, no vomiting, no diarrhea, no bleeding symptoms  Genitourinary: No urinary symptoms or hematuria  Integument/breast: No ulcers or rashes  Musculoskeletal: Neg  Neurological: No focal weakness, no seizures, no headaches  Behvioral/Psych: No anxiety, no depression  Constitutional: No fever, no chills, no weight loss, no night sweats     Objective:     Visit Vitals  /66 (BP 1 Location: Left upper arm, BP Patient Position: Sitting, BP Cuff Size: Large adult)   Pulse 92   Temp 97.2 °F (36.2 °C) (Temporal)   Resp 14   Ht 5' 5\" (1.651 m)   Wt 98.9 kg (218 lb)   SpO2 96% Comment: RA @ Rest   BMI 36.28 kg/m²        Physical Exam:   General: comfortable, no acute distress  HEENT: pupils reactive, sclera anicteric, EOM intact. +Cerumen impaction Lt ear. +B/L turbinate edema. Neck: No adenopathy or thyroid swelling, no lymphadenopathy or JVD, supple  CVS: S1S2 no murmurs  RS: Lungs CTA, no wheezes or rhonchi.  No tachypnea or accessory muscle use  Abd: soft, non tender, no hepatosplenomegaly  Neuro: non focal, awake, alert  Extrm: no leg edema, clubbing or cyanosis  Skin: no rash    Data review:   Pertinent labs: CBC, BMP, LFT's  Serologies (ILD, MEGAN), IgE, Allergy panel, A1AT level and ACE level: not available for review    PFT:     Date FVC Best BUL0Gunr FEV1/FVC NBR59-10% TLC FRC RV VC DLCO   7/8/15 3.25(87%) 2.66(86%) 82 2.69 (83%) 4.55(87%) 2.21(82%) 1.45(94%) 3.11(83%) 17.97(71%)   3/29/19 3.16(835) 2.54(82%) 80 2.58(82%)                                    6 min walk test: not available    Echo (3/11/21):  · LV: Estimated LVEF is 60 - 65%. Normal cavity size, systolic function (ejection fraction normal) and diastolic function. Mild concentric hypertrophy. Wall motion: normal.  · RV: Normal right ventricular size and function. · No hemodynamically significant valvular pathology. · PA: Pulmonary arterial systolic pressure is 25 mmHg. Exercise stress test (03/2021):  ECG Baseline ECG: Normal EKG. There was no ST segment deviation noted during rest. There were no arrhythmias during stress. There was no ST segment deviation noted during stress. There were no arrhythmias during recovery. ECG is diagnostic. Stress Findings A stress test was performed following the Yaya protocol, with the patient reaching stage 2. The patient reported no angina during stress. The patient's response to exercise was adequate for diagnosis. Blood pressure demonstrated a normal response to exercise. Heart rate demonstrated maximal response to stress. Angina Index is 0. The Duke Treadmill score is 6 (low risk).        Imaging:  I have personally reviewed the patients radiographs and have reviewed the reports:  CXR Results (most recent): 03/2020  The lungs are hypoinflated.     The cardiac silhouette is within normal limits in size.     There is no confluent consolidation.     There is no evidence of pleural effusion.     There is no evidence of pneumothorax.     There is no evidence of acute osseous abnormality.     IMPRESSION:  Streaky left greater than right bibasilar opacities likely atelectasis although  early infiltrates not excluded. Patient Active Problem List   Diagnosis Code    Herpes simplex B00.9    ADHD (attention deficit hyperactivity disorder) F90.9    GERD (gastroesophageal reflux disease) K21.9    Vitamin D deficiency E55.9    Diverticulosis seen on CT K57.90    Bone spurs on feet M77.50    Family history of breast cancer Z80.3    At high risk for breast cancer Z91.89    COPD with asthma ([facility] J44.9    Celiac disease K90.0    Anxiety disorder F41.9    Irritable bowel syndrome with diarrhea K58.0    Numerous moles D22.9    PMDD (premenstrual dysphoric disorder) F32.81    Bipolar 1 disorder (HCC) F31.9    H/O premenstrual syndrome Z87.42    H/O ovarian cystectomy Z98.890, Z87.42    Alcohol abuse F10.10    Severe obesity (HCC) E66.01    Abnormal mammogram of right breast R92.8       IMPRESSION:   · Asthma, mild intermittent: patient presenting for f/u with minimal need for inhaler use. Also notes improvement in symptoms after tobacco cessation. No nighttime symptoms. · Dyspnea on exertion: presence of airflow obstruction not present on spirometry. Notes recurrent episodes of bronchitis in the past. CXR without evidence of COPD. Labwork without anemia. SpO2 96% on RA in clinic today. · Snoring with EDS and witnessed apneas: STOP-Bang score of 5 places patient at high risk for sleep-disordered breathing. ? Contribution of clonazepam, quetiapine and Flexeril, and Geodon on sleep symptoms. · H/o Tobacco abuse: 52 pack years; quit 2019  · Comorbid conditions: Obesity, BMI 36.28kg/m2      RECOMMENDATIONS:   · Obtain repeat PFT w/ pre-post BD testing  · Pharmacotherapy - resume albuterol/Proventil rescue inhaler 1-2 puff every 4-6 hours as needed for shortness of breath.   · Recommend continued avoidance of triggers  · Obtain in-lab PSG given h/o night zacarias  · Pulmonary rehab pending completion of PFT  · Vaccination: recommend completion of all age-appropriate immunizations  · Defer management of anxiety/depressio to PCP and Psychiatry. · Follow up - 3 months       Health maintenance screens deferred to Primary care provider.      Moses Sigala DO  11/19/2021  Pulmonary, Critical Care Medicine  [facility]

## 2021-11-18 NOTE — PROGRESS NOTES
Chauncey Roberts presents today for   Chief Complaint   Patient presents with    Shortness of Breath    Follow-up       Is someone accompanying this pt? No    Is the patient using any DME equipment during [address]? no    -DME Company no    Depression Screening:  3 most recent PHQ Screens 11/18/2021   PHQ Not Done -   Little interest or pleasure in doing things Not at all   Feeling down, depressed, irritable, or hopeless Not at all   Total Score PHQ 2 0   Trouble falling or staying asleep, or sleeping too much -   Feeling tired or having little energy -   Poor appetite, weight loss, or overeating -   Feeling bad about yourself - or that you are a failure or have let yourself or your family down -   Trouble concentrating on things such as school, work, reading, or watching TV -   Moving or speaking so slowly that other people could have noticed; or the opposite being so fidgety that others notice -   Thoughts of being better off dead, or hurting yourself in some way -   PHQ 9 Score -   How difficult have these problems made it for you to do your work, take care of your home and get along with others -       Learning Assessment:  Learning Assessment 11/18/2021   PRIMARY LEARNER Patient   HIGHEST LEVEL OF EDUCATION - PRIMARY LEARNER  -   BARRIERS PRIMARY LEARNER -   CO-LEARNER CAREGIVER -   PRIMARY LANGUAGE ENGLISH    NEED -   LEARNER PREFERENCE PRIMARY DEMONSTRATION     -   LEARNING SPECIAL TOPICS -   ANSWERED BY [place]       Abuse Screening:  Abuse Screening Questionnaire 11/18/2021   Do you ever feel afraid of your partner? N   Are you in a relationship with someone who physically or mentally threatens you? N   Is it safe for you to go home? Y       Fall Risk  Fall Risk Assessment, last 12 mths 11/18/2021   Able to walk? Yes   Fall in past 12 months? 0   Do you feel unsteady? 0   Are you worried about falling 0         Coordination of Care:  1.  Have you been to the ER, urgent care clinic since your last visit? Hospitalized since your last visit? No    2. Have you seen or consulted any other health care providers outside of the [address] [place] [address] since your last visit? Include any pap smears or colon screening. n/a    Medication variance in dosage/sig per patient as follows: medications up to date per patient.

## 2021-11-18 NOTE — PATIENT INSTRUCTIONS
What is the plan?  -Continue use of your albuterol rescue inhaler as needed for shortness of breath  -schedule and complete pulmonary function test  -schedule and complete sleep study (PSG)

## 2021-11-18 NOTE — LETTER
11/19/2021    Patient: Roberta Hartman   YOB: 1980   Date of Visit: 11/18/2021     Bhavani Sofia DNP  [id number] [place] [address]    Dear Bhavani Sofia DNP,      Thank you for referring Ms. Lida Reyes to [address] [place] [address] for evaluation. My notes for this consultation are attached. If you have questions, please do not hesitate to call me. I look forward to following your patient along with you.       Sincerely,    Danielle Payne, DO

## 2021-12-03 ENCOUNTER — HOSPITAL ENCOUNTER (OUTPATIENT)
Dept: SLEEP MEDICINE | Age: 41
Discharge: HOME | End: 2021-12-03
Attending: INTERNAL MEDICINE
Payer: COMMERCIAL

## 2021-12-03 DIAGNOSIS — R06.83 SNORING: ICD-10-CM

## 2021-12-03 DIAGNOSIS — R06.81 WITNESSED EPISODE OF APNEA: ICD-10-CM

## 2021-12-03 DIAGNOSIS — G47.19 EXCESSIVE DAYTIME SLEEPINESS: ICD-10-CM

## 2021-12-03 PROCEDURE — 95811 POLYSOM 6/>YRS CPAP 4/> PARM: CPT

## 2021-12-04 VITALS — SYSTOLIC BLOOD PRESSURE: 111 MMHG | DIASTOLIC BLOOD PRESSURE: 71 MMHG

## 2021-12-19 DIAGNOSIS — G47.33 OSA (OBSTRUCTIVE SLEEP APNEA): Primary | ICD-10-CM

## 2021-12-19 NOTE — PROGRESS NOTES
[address], [place], [address],  [phone]     Polysomnography Report    Heather Rosario       Patient: Wilma Hinton Study Type: Split Night PSG   : 1980 Patient Details: Female, 41 years, Height 5' 4\",   MR#: MV   Weight 217.6 lbs, BMI 37.35   Physician: Lester Lee DO Ref. Physician: Radha Pedersen   Recording Technician: CITLALI Bartlett [facility] Recording Details: Recorded at 8:36:17 PM on 12/3/2021    Scoring Technician:   for 585.5 minutes. STUDY PROTOCOL: The study consisted of 14 channels, including left and right EOG and EEG, submental and extremity EMG, EKG, airflow, respiratory effort and oximetry measurement. The study report was generated by personal review of the raw data from the patient's sleep study. TECHNICAL: A Hypopnea was scored according to AASM guidelines. A hypopnea was scored when the nasal pressure signal dropped by 30% or greater from the pre-event baseline for greater than or equal to 10 seconds and was accompanied by at least a 3% or 4% drop in the SpO2 from pre-event baseline. For Medicare and/or Medicaid patients, hypopneas were scored with an associated 4% drop in SpO2 from pre-event baseline. SPLIT NIGHT CPAP REPORT  MEDICATIONS: Topamax, albuterol , Hytone, Nexplanon, Effexor-XR, Buspar, Minipress, Geodon, Lamictal, Klonopin, Seroquel    SUMMARY: The blood pressure readings: pre   sleep BP:111/71 post sleep BP:111/71. The sleep efficiency was 90%. During the night, 18 awakenings were recorded, with 33.5 minutes spent awake after sleep onset. The arousal index was 12.5 per hour. The sleep onset latency was 5.5 minutes; the stage 2 latency was 21.5 minutes. REM sleep was 28 percent of sleep time; slow wave sleep was 27 percent. Snoring was noted to range from  mild to loud. DIAGNOSTIC PORTION:  The AHI was 44 per hour; the RDI was 53 per hour.  The PLM index was 6, with 0 associated with arousal hourly, on average. The time with oxygen saturation below 88% was 7.1 minutes. The minimum oxygen saturation was 67%. The oxygen desaturation index was 44.0. The average respiratory event lasted 13 seconds. The longest event was 1 seconds. PAP PORTION:  The overall AHI was 1.2 per hour; the RDI was 2 per hour. The PLM index was 2, with 0.5 associated with arousal hourly, on average. The time with oxygen saturation below 88% was 0.0 minutes. The minimum oxygen saturation was 90%. The oxygen desaturation index was 0.9. The average respiratory event lasted 13 seconds. The longest event was 0 seconds. The respiratory rate was typically between 12 and 20 breaths per minute; Cara Dys pattern was not seen. Mouth breathing was frequently noted by recording tech. Seroquel, 100mg, was reported as self-administered. The patient reported excessive sleep the previous night (>9 hours). Sleep hygiene violation:  caffeine and napping. Sleep was interrupted by one bathroom trip. Cardiac tracing appeared to be normal sinus rhythm, without significant atrial fibrillation, heart block, or bradycardia. Mild, intermittent sinus tachycardia was also noted. No parasomnias or EEG abnormalities occurred. Alpha intrusion was observed. Supplemental oxygen was not used during this study. Summary:  Overall, the patient appeared to tolerate study well but did report sleeping worse than typical sleep pattern. Severe SOHAIL was observed during the initial portion of this study. The patient was subsequently started on a CPAP titration. SOHAIL physiology appeared well optimized at a final CPAP setting of 19 cwp. REM supine sleep was achieved at final CPAP setting. PRESSURES USED DURING THE STUDY: 5, 7, 9, 11, 12, 14, 16, 17, 19 cwp.     DIAGNOSIS: 1) Obstructive Sleep Apnea (SOHAIL), AHI: 44, REM-AHI: 106  2) Sinus Tachycardia    RECOMMENDATIONS:     This report was generated by personal review of the raw data which was acceptable for interpretation     Start patient on auto CPAP (APAP) at 10/20 cwp; EPR/Flex:2.    Low threshold to have patient return for in-lab PAP titration for APAP intolerance or lack of treatment efficacy.     Other non-invasive treatment options are recommended were applicable and include the following: weight reduction, smoking cessation, body position training, and modification of alcohol ingestion and/or sedating agents.  If these treatments are not possible or effective, an oral appliance may be an alternative and/or adjuvant non-invasive treatment.  If non-invasive treatments are not possible or effective, consideration may be given to possible corrective surgical options.  Healthy sleep habits are encouraged.  Individuals are encouraged to obtain 7-9 hours of sleep per day.   safety is encouraged. Drowsy and/or inattentive driving should be avoided.  COVID-19 precautions as recommended by the Centers for Disease Control (CDC)     Follow up with provider as directed.     Dewey Blandon DO Electronically signed at 1:58:50 PM, December 19, 2021

## 2022-01-01 DIAGNOSIS — R92.8 ABNORMAL MAMMOGRAM OF RIGHT BREAST: ICD-10-CM

## 2022-01-01 DIAGNOSIS — R92.8 ABNORMAL SCREENING MAMMOGRAM: ICD-10-CM

## 2022-01-03 ENCOUNTER — TRANSCRIBE ORDER (OUTPATIENT)
Dept: SCHEDULING | Age: 42
End: 2022-01-03

## 2022-01-03 DIAGNOSIS — Z12.31 VISIT FOR SCREENING MAMMOGRAM: Primary | ICD-10-CM

## 2022-01-10 ENCOUNTER — TELEPHONE (OUTPATIENT)
Dept: INTERNAL MEDICINE CLINIC | Age: 42
End: 2022-01-10

## 2022-01-10 DIAGNOSIS — Z00.00 PHYSICAL EXAM, ANNUAL: ICD-10-CM

## 2022-01-10 DIAGNOSIS — R73.09 ELEVATED GLUCOSE: Primary | ICD-10-CM

## 2022-01-10 NOTE — TELEPHONE ENCOUNTER
Pt scheduled for February. She wants labs put in so she can get them before appt. She says she needs full labs especially thyroid.

## 2022-01-10 NOTE — TELEPHONE ENCOUNTER
----- Message from Casper Adam sent at 1/10/2022  3:57 PM EST -----  Subject: Message to Provider    QUESTIONS  Information for Provider? PT needs lab orders because thyroid issues run   in her family and she doesnt have it yet and is wanting to get it checked   ---------------------------------------------------------------------------  --------------  CALL BACK INFO  What is the best way for the office to contact you? OK to leave message on   voicemail  Preferred Call Back Phone Number? [phone]  ---------------------------------------------------------------------------  --------------  SCRIPT ANSWERS  Relationship to Patient?  Self

## 2022-01-10 NOTE — TELEPHONE ENCOUNTER
----- Message from [employer] sent at 1/10/2022  3:56 PM EST -----  Subject: Appointment Request    Reason for Call: Routine (Patient Request) No Script    QUESTIONS  Type of Appointment? Established Patient  Reason for appointment request? Available appointments did not meet   patient need  Additional Information for Provider? PT trying to get an rob scheduled to   go over labs once she gets them done prefers not dr Jael Cruz   ---------------------------------------------------------------------------  --------------  [address]  What is the best way for the office to contact you? OK to leave message on   voicemail  Preferred Call Back Phone Number? [phone]  ---------------------------------------------------------------------------  --------------  SCRIPT ANSWERS  Relationship to Patient? Self  (Is the patient requesting to see the provider for a procedure?)? No  (Is the patient requesting to see the provider urgently  today or   tomorrow. )? No  Have you been diagnosed with, awaiting test results for, or told that you   are suspected of having COVID-19 (Coronavirus)? (If patient has tested   negative or was tested as a requirement for work, school, or travel and   not based on symptoms, answer no)? No  Within the past two weeks have you developed any of the following symptoms   (answer no if symptoms have been present longer than 2 weeks or began   more than 2 weeks ago)? Fever or Chills, Cough, Shortness of breath or   difficulty breathing, Loss of taste or smell, Sore throat, Nasal   congestion, Sneezing or runny nose, Fatigue or generalized body aches   (answer no if pain is specific to a body part e.g. back pain), Diarrhea,   Headache? No  Have you had close contact with someone with COVID-19 in the last 14 days? No  (Service Expert  click yes below to proceed with [employer]ual   Scheduling)?  Yes

## 2022-01-17 NOTE — TELEPHONE ENCOUNTER
Patient returned call, relayed message. She will get her labs done at [facility] the week prior to her appt, thank you.

## 2022-02-15 ENCOUNTER — TRANSCRIBE ORDER (OUTPATIENT)
Dept: SCHEDULING | Age: 42
End: 2022-02-15

## 2022-02-15 DIAGNOSIS — R92.8 OTHER ABNORMAL AND INCONCLUSIVE FINDINGS ON DIAGNOSTIC IMAGING OF BREAST: Primary | ICD-10-CM

## 2022-02-17 ENCOUNTER — TELEPHONE (OUTPATIENT)
Dept: INTERNAL MEDICINE CLINIC | Age: 42
End: 2022-02-17

## 2022-02-17 NOTE — TELEPHONE ENCOUNTER
----- Message from Marii Britton sent at 2/17/2022 11:34 AM EST -----  Subject: Appointment Request    Reason for Call: Routine Existing Condition Follow Up    QUESTIONS  Type of Appointment? Established Patient  Reason for appointment request? Available appointments did not meet   patient need  Additional Information for Provider? PT needs to reschedule her appt for   2/18 , due to not being able to get the blood work done prior to the appt,   Appt Reason? Routine Existing Condition Follow Up Pt rescheduling ov. Screened green. If an earlier appt becomes available other than 02.15.2   please contact pt. Booking Code? [id number], after trying to reschedule ,   there were no available appts till 3/17 pt is starting a new job and wont   be able to get off , pt has till the 14tn to come in at anytime , please   call to schedule   ---------------------------------------------------------------------------  --------------  [address]  What is the best way for the office to contact you? OK to leave message on   voicemail  Preferred Call Back Phone Number? [phone]  ---------------------------------------------------------------------------  --------------  SCRIPT ANSWERS  Relationship to Patient? Self  Have your symptoms changed? No  Have you been diagnosed with, awaiting test results for, or told that you   are suspected of having COVID-19 (Coronavirus)? (If patient has tested   negative or was tested as a requirement for work, school, or travel and   not based on symptoms, answer no)? No  Within the past 10 days have you developed any of the following symptoms   (answer no if symptoms have been present longer than 10 days or began   more than 10 days ago)?  Fever or Chills, Cough, Shortness of breath or   difficulty breathing, Loss of taste or smell, Sore throat, Nasal   congestion, Sneezing or runny nose, Fatigue or generalized body aches   (answer no if pain is specific to a body part e.g. back pain), Diarrhea, Headache? No  Have you had close contact with someone with COVID-19 in the last 7 days? No  (Service Expert  click yes below to proceed with [employer] As Usual   Scheduling)?  Yes

## 2022-03-18 PROBLEM — F41.9 ANXIETY DISORDER: Status: ACTIVE | Noted: 2018-05-10

## 2022-03-18 PROBLEM — Z91.89 AT HIGH RISK FOR BREAST CANCER: Status: ACTIVE | Noted: 2017-06-12

## 2022-03-18 PROBLEM — M77.50 BONE SPUR OF FOOT: Status: ACTIVE | Noted: 2017-04-13

## 2022-03-18 PROBLEM — R92.8 ABNORMAL MAMMOGRAM OF RIGHT BREAST: Status: ACTIVE | Noted: 2020-02-03

## 2022-03-18 PROBLEM — K57.90 DIVERTICULOSIS: Status: ACTIVE | Noted: 2017-04-13

## 2022-03-19 PROBLEM — Z98.890 H/O OVARIAN CYSTECTOMY: Status: ACTIVE | Noted: 2019-10-04

## 2022-03-19 PROBLEM — D22.9 NUMEROUS MOLES: Status: ACTIVE | Noted: 2018-05-10

## 2022-03-19 PROBLEM — J44.89 COPD WITH ASTHMA: Status: ACTIVE | Noted: 2017-07-14

## 2022-03-19 PROBLEM — K90.0 CELIAC DISEASE: Status: ACTIVE | Noted: 2018-06-08

## 2022-03-19 PROBLEM — K21.9 GERD (GASTROESOPHAGEAL REFLUX DISEASE): Status: ACTIVE | Noted: 2017-04-13

## 2022-03-19 PROBLEM — F31.9 BIPOLAR 1 DISORDER (HCC): Status: ACTIVE | Noted: 2018-05-10

## 2022-03-19 PROBLEM — Z87.42: Status: ACTIVE | Noted: 2019-10-04

## 2022-03-19 PROBLEM — Z87.42 H/O OVARIAN CYSTECTOMY: Status: ACTIVE | Noted: 2019-10-04

## 2022-03-19 PROBLEM — J44.9 COPD WITH ASTHMA (HCC): Status: ACTIVE | Noted: 2017-07-14

## 2022-03-19 PROBLEM — F32.81 PMDD (PREMENSTRUAL DYSPHORIC DISORDER): Status: ACTIVE | Noted: 2018-05-10

## 2022-03-19 PROBLEM — K58.0 IRRITABLE BOWEL SYNDROME WITH DIARRHEA: Status: ACTIVE | Noted: 2018-05-10

## 2022-03-19 PROBLEM — E66.01 SEVERE OBESITY (HCC): Status: ACTIVE | Noted: 2020-01-15

## 2022-03-20 PROBLEM — E55.9 VITAMIN D DEFICIENCY: Status: ACTIVE | Noted: 2017-04-13

## 2022-03-20 PROBLEM — Z80.3 FAMILY HISTORY OF BREAST CANCER: Status: ACTIVE | Noted: 2017-06-12

## 2022-03-20 PROBLEM — F90.9 ADHD (ATTENTION DEFICIT HYPERACTIVITY DISORDER): Status: ACTIVE | Noted: 2017-04-13

## 2022-03-20 PROBLEM — F10.10 ALCOHOL ABUSE: Status: ACTIVE | Noted: 2019-10-04

## 2022-03-25 ENCOUNTER — TELEPHONE (OUTPATIENT)
Dept: INTERNAL MEDICINE CLINIC | Age: 42
End: 2022-03-25

## 2022-03-25 NOTE — TELEPHONE ENCOUNTER
----- Message from Meet Gomes sent at 3/25/2022  8:19 AM EDT -----  Subject: Message to Provider    QUESTIONS  Information for Provider? pt is requesting a callback to schedule lab work   before her appt.  ---------------------------------------------------------------------------  --------------  [address]  What is the best way for the office to contact you? OK to leave message on   voicemail  Preferred Call Back Phone Number? [phone]  ---------------------------------------------------------------------------  --------------  SCRIPT ANSWERS  Relationship to Patient?  Self

## 2022-03-30 LAB — CREATININE, EXTERNAL: 0.68

## 2022-03-31 ENCOUNTER — HOSPITAL ENCOUNTER (OUTPATIENT)
Dept: LAB | Age: 42
Discharge: HOME | End: 2022-03-31
Payer: MEDICAID

## 2022-03-31 ENCOUNTER — LAB ONLY (OUTPATIENT)
Dept: INTERNAL MEDICINE CLINIC | Age: 42
End: 2022-03-31

## 2022-03-31 DIAGNOSIS — R73.09 ELEVATED GLUCOSE: ICD-10-CM

## 2022-03-31 DIAGNOSIS — Z00.00 PHYSICAL EXAM, ANNUAL: Primary | ICD-10-CM

## 2022-03-31 DIAGNOSIS — Z00.00 PHYSICAL EXAM, ANNUAL: ICD-10-CM

## 2022-03-31 LAB
ALBUMIN SERPL-MCNC: 3.8 G/DL (ref 3.4–5)
ALBUMIN/GLOB SERPL: 1 {RATIO} (ref 0.8–1.7)
ALP SERPL-CCNC: 132 U/L (ref 45–117)
ALT SERPL-CCNC: 19 U/L (ref 13–56)
ANION GAP SERPL CALC-SCNC: 4 MMOL/L (ref 3–18)
AST SERPL-CCNC: 13 U/L (ref 10–38)
BILIRUB SERPL-MCNC: 0.4 MG/DL (ref 0.2–1)
BUN SERPL-MCNC: 9 MG/DL (ref 7–18)
BUN/CREAT SERPL: 14 (ref 12–20)
CALCIUM SERPL-MCNC: 8.9 MG/DL (ref 8.5–10.1)
CHLORIDE SERPL-SCNC: 108 MMOL/L (ref 100–111)
CHOLEST SERPL-MCNC: 162 MG/DL
CO2 SERPL-SCNC: 27 MMOL/L (ref 21–32)
CREAT SERPL-MCNC: 0.64 MG/DL (ref 0.6–1.3)
EST. AVERAGE GLUCOSE BLD GHB EST-MCNC: 105 MG/DL
GLOBULIN SER CALC-MCNC: 4 G/DL (ref 2–4)
GLUCOSE SERPL-MCNC: 76 MG/DL (ref 74–99)
HBA1C MFR BLD: 5.3 % (ref 4.2–5.6)
HDLC SERPL-MCNC: 33 MG/DL (ref 40–60)
HDLC SERPL: 4.9 {RATIO} (ref 0–5)
LDLC SERPL CALC-MCNC: 106.2 MG/DL (ref 0–100)
LIPID PROFILE,FLP: ABNORMAL
POTASSIUM SERPL-SCNC: 3.9 MMOL/L (ref 3.5–5.5)
PROT SERPL-MCNC: 7.8 G/DL (ref 6.4–8.2)
SODIUM SERPL-SCNC: 139 MMOL/L (ref 136–145)
TRIGL SERPL-MCNC: 114 MG/DL (ref ?–150)
TSH SERPL DL<=0.05 MIU/L-ACNC: 0.77 UIU/ML (ref 0.36–3.74)
VLDLC SERPL CALC-MCNC: 22.8 MG/DL

## 2022-03-31 PROCEDURE — 83036 HEMOGLOBIN GLYCOSYLATED A1C: CPT

## 2022-03-31 PROCEDURE — 80061 LIPID PANEL: CPT

## 2022-03-31 PROCEDURE — 36415 COLL VENOUS BLD VENIPUNCTURE: CPT

## 2022-03-31 PROCEDURE — 84443 ASSAY THYROID STIM HORMONE: CPT

## 2022-03-31 PROCEDURE — 80053 COMPREHEN METABOLIC PANEL: CPT

## 2022-04-01 ENCOUNTER — OFFICE VISIT (OUTPATIENT)
Dept: INTERNAL MEDICINE CLINIC | Age: 42
End: 2022-04-01
Payer: MEDICAID

## 2022-04-01 ENCOUNTER — TELEPHONE (OUTPATIENT)
Dept: INTERNAL MEDICINE CLINIC | Age: 42
End: 2022-04-01

## 2022-04-01 VITALS
HEIGHT: 65 IN | SYSTOLIC BLOOD PRESSURE: 120 MMHG | TEMPERATURE: 97.5 F | RESPIRATION RATE: 16 BRPM | BODY MASS INDEX: 36.82 KG/M2 | HEART RATE: 82 BPM | WEIGHT: 221 LBS | DIASTOLIC BLOOD PRESSURE: 66 MMHG

## 2022-04-01 DIAGNOSIS — F31.9 BIPOLAR 1 DISORDER (HCC): ICD-10-CM

## 2022-04-01 DIAGNOSIS — R92.8 ABNORMAL MAMMOGRAM OF RIGHT BREAST: ICD-10-CM

## 2022-04-01 DIAGNOSIS — E55.9 VITAMIN D DEFICIENCY: ICD-10-CM

## 2022-04-01 DIAGNOSIS — E66.9 OBESE BODY HABITUS: ICD-10-CM

## 2022-04-01 DIAGNOSIS — F41.9 ANXIETY DISORDER, UNSPECIFIED TYPE: ICD-10-CM

## 2022-04-01 DIAGNOSIS — G47.33 OSA (OBSTRUCTIVE SLEEP APNEA): ICD-10-CM

## 2022-04-01 DIAGNOSIS — E78.2 MIXED HYPERLIPIDEMIA: Primary | ICD-10-CM

## 2022-04-01 DIAGNOSIS — J44.9 COPD WITH ASTHMA (HCC): ICD-10-CM

## 2022-04-01 PROCEDURE — 99214 OFFICE O/P EST MOD 30 MIN: CPT | Performed by: NURSE PRACTITIONER

## 2022-04-01 NOTE — PROGRESS NOTES
[facility] [address]   [place], [address]  [phone] [facility]/[phone] fax    4/1/2022    Rafael Mai 1980 is a pleasant [address] female. 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 Not  and no children. Past Medical History:   Diagnosis Date    Anxiety disorder 5/10/2018    Bipolar 1 disorder ([facility]     [address] COPD with asthma ([facility] 7/14/2017    Genital herpes     GERD (gastroesophageal reflux disease) 4/13/2017    Mixed hyperlipidemia     Numerous moles 05/10/2018    Dr Parag Metcalf, [facility] [place], Derm    OAB (overactive bladder)     SOHAIL (obstructive sleep apnea) 4/4/2022    PMDD (premenstrual dysphoric disorder) 5/10/2018    Vitamin D deficiency 4/13/2017     Past Surgical History:   Procedure Laterality Date    HX COLONOSCOPY      age 22 for diarrhea    HX CYST REMOVAL      hand    HX GYN      HX ORTHOPAEDIC      right hand    HX OVARIAN CYST REMOVAL      right ovary     Current Outpatient Medications   Medication Sig    topiramate (TOPAMAX) 100 mg tablet     albuterol (PROVENTIL HFA, VENTOLIN HFA, PROAIR HFA) 90 mcg/actuation inhaler Take 2 Puffs by inhalation every four (4) hours as needed for Wheezing or Shortness of Breath.  cyclobenzaprine (FLEXERIL) 5 mg tablet cyclobenzaprine 5 mg tablet    dextroamphetamine-amphetamine (ADDERALL) 20 mg tablet dextroamphetamine-amphetamine 20 mg tablet   TAKE 1 TABLET BY MOUTH 3 TIMES A DAY    hydrocortisone (HYTONE) 2.5 % ointment hydrocortisone 2.5 % topical ointment    etonogestreL (Nexplanon) 68 mg impl by SubDERmal route.  venlafaxine-SR (EFFEXOR-XR) 150 mg capsule Take 1 Cap by mouth daily.  busPIRone (BUSPAR) 10 mg tablet Take 50 mg by mouth daily. Take 3 tabs in the morning and 2 tabs at night.  prazosin (MINIPRESS) 2 mg capsule Take 2 mg by mouth nightly.     ziprasidone (GEODON) 20 mg capsule Take 20 mg by mouth two (2) times a day.    lamoTRIgine (LAMICTAL) 100 mg tablet Take 100 mg by mouth nightly.  clonazePAM (KlonoPIN) 1 mg tablet Take 1 mg by mouth three (3) times daily as needed.  QUEtiapine (SEROQUEL) 50 mg tablet Take 100 mg by mouth at bedtime as directed for dose pack. Take 2 tablets at bedtime. No current facility-administered medications for this visit. Allergies and Intolerances: Allergies   Allergen Reactions    Prozac [Fluoxetine] Unknown (comments) and Other (comments)     Family History:   Family History   Problem Relation Age of Onset   [place] Migraines Mother     Stroke Mother     Depression Mother     Hypertension Mother     Diabetes Mother     Thyroid Disease Mother    [place] Arthritis-rheumatoid Mother     Alcohol abuse Father     Cancer Father         skin cancer    Hypertension Father     Asthma Sister     Diabetes Sister     Heart Disease Sister     Depression Sister     Substance Abuse Sister         tobacco use    Hypertension Sister     Heart Failure Sister     Thyroid Disease Sister     Stroke Maternal Grandmother     Substance Abuse Maternal Grandmother         tobacco use    Breast Cancer Maternal Grandmother     Alzheimer's Disease Maternal Grandmother     Diabetes Maternal Grandfather     COPD Maternal Grandfather     Substance Abuse Maternal Grandfather         tobacco use    Substance Abuse Paternal Grandmother         tobacco use    Substance Abuse Paternal Grandfather     Breast Cancer Paternal Aunt     Arthritis Maternal Aunt      Social History:   She  reports that she quit smoking about 2 years ago. Her smoking use included cigarettes. She started smoking about 30 years ago. She has a 34.50 pack-year smoking history.  She has never used smokeless tobacco.   Social History     Substance and Sexual Activity   Alcohol Use Not Currently    Alcohol/week: 2.0 standard drinks    Types: 2 Glasses of wine per week    Comment: 2x week and on weekends: 1-6 drinks/sitting Immunization History:  Immunization History   Administered Date(s) Administered    Influenza Vaccine (>6 mo Afluria QUAD Vial 97145 (0.25 mL) / 67994 (0.5 mL)) 09/26/2016, 09/28/2018, 09/01/2019    Influenza Vaccine [facility]WA Group) PF (>6 Mo Flulaval, Fluarix, and >3 Yrs Afluria, Fluzone 86672) 10/09/2017, 10/04/2019, 10/07/2020    PPD 11/15/2010    Pneumococcal Polysaccharide (PPSV-23) 09/26/2016    TB Skin Test (PPD) Intradermal 06/09/2020    Tdap 07/16/2012       Todays concerns:  1. SOHAIL. Completed sleep study 12/2021 under Dr. Erin Schilling. She was informed she needed a CPAP machine but has not obtained this equipment nor has she reached out to the neurology office to inquire. 2.  Bipolar. Continues to see Dr Andria Marcus. She is tolerating her psych meds without side effects. 3.  Weight gain. Her weight remains elevated. She suspects Seroquel is the cause for her weight gain. She will talk to her psychiatrist in reference to possible alternative treatments. She is seeking a referral to weight loss center. 4.  Scheduled for Pap smear on Tuesday with Dr. Mckenzie Rivers with [facility]. She has a Nexplanon in her left arm. 5. Abnormal mammogram (Right). GYN monitoring. Review of Systems:   As above included in HPI. Otherwise 11 point review of systems negative including constitutional, skin, HENT, eyes, respiratory, cardiovascular, gastrointestinal, genitourinary, musculoskeletal, endocrine, hematologic, allergy, and neurologic. Review of Data:  -114; LDL   TSH 0.77  A1c 5.5-5.3    Physical:   Visit Vitals  /66   Pulse 82   Temp 97.5 °F (36.4 °C) (Temporal)   Resp 16   Ht 5' 5\" (1.651 m)   Wt 221 lb (100.2 kg)   BMI 36.78 kg/m²      Wt Readings from Last 3 Encounters:   04/01/22 221 lb (100.2 kg)   11/18/21 218 lb (98.9 kg)   03/11/21 244 lb (110.7 kg)       Exam:   Physical Exam  Constitutional:       Appearance: Normal appearance. She is obese.    HENT:      Head: Normocephalic and atraumatic. Right Ear: Tympanic membrane, ear canal and external ear normal.      Left Ear: Tympanic membrane, ear canal and external ear normal.   Eyes:      Extraocular Movements: Extraocular movements intact. Conjunctiva/sclera: Conjunctivae normal.   Neck:      Vascular: No carotid bruit. Cardiovascular:      Rate and Rhythm: Normal rate and regular rhythm. Pulses: Normal pulses. Heart sounds: Normal heart sounds. Comments: No edema noted to will LEs  Pulmonary:      Effort: Pulmonary effort is normal. No respiratory distress. Breath sounds: Normal breath sounds. No wheezing. Comments: Able to carry on full conversation without SOB  Abdominal:      General: Abdomen is flat. Bowel sounds are normal. There is no distension. Palpations: Abdomen is soft. Tenderness: There is no abdominal tenderness. Genitourinary:     Comments: Deferred   Musculoskeletal:         General: No swelling, tenderness, deformity or signs of injury. Normal range of motion. Cervical back: Normal range of motion and neck supple. Skin:     General: Skin is warm and dry. Neurological:      General: No focal deficit present. Mental Status: She is alert and oriented to person, place, and time. Psychiatric:         Attention and Perception: Attention normal.         Mood and Affect: Mood normal.         Speech: Speech normal.         Behavior: Behavior normal.         Thought Content: Thought content does not include homicidal or suicidal plan. Cognition and Memory: Cognition normal.        Body mass index is 36.78 kg/m². Plan:      ICD-10-CM ICD-9-CM    1. Mixed hyperlipidemia  E78.2 272.2    2. Vitamin D deficiency  E55.9 268.9    3. COPD [facility] ([facility]  J44.9 493.20    4. SOHAIL (obstructive sleep apnea)  G47.33 327.23    5. Anxiety disorder, unspecified type  F41.9 300.00    6. Bipolar 1 disorder (HCC)  F31.9 296.7    7.  Abnormal mammogram of right breast  R92.8 793.80    8. Obese body habitus  E66.9 278.00 REFERRAL TO WEIGHT LOSS     -Mixed hyperlipidemia  -114; LDL   Reduce fried fatty or oily foods in diet  Limit red meats, processed foods, and alcohol. Limit fast food  Work on weight reduction  Increase water intake    -Vit D Def  Continue OTC Vit D 4000 units daily    -COPD with asthma  Continue albuterol inhaler as needed  Specialist managed condition is being evaluated/managed by Dr. Nayely Mckenna. No acute findings today meriting change in management plan. -SOHAIL  Completed in Dec 2021  Has not received CPAP device  Encouraged pt to reach out to pulm office to inquire about CPAP    -Anxiety/Bipolar  Continue all current therapy as prescribed  Specialist managed condition is being evaluated/managed by Dr. Micky Irene. No acute findings today meriting change in management plan. -Abnormal mammogram Right breast  Follow up DX mammo and US appt 4/8/2022     -Obese body habitus  BMI is out of normal parameters and plan is as follows: Discussed in great detail on diet, portion control, exercise, avoiding foods high in sugar, carbs and starches, fatty/greasy foods and to eat until satisfied not til full. I have counseled this patient on diet and exercise regimens as well. Recommended weight watchers, Noom, and GOLO. Referral to weight loss center placed      Reviewed medication and completed medication reconciliation with the patient. Reviewed side effects of medications with the patient. Questions were answered and patient verb understanding.       Follow up 12 months with labs (lipid, Vit d, CMP)      Dr. Dev Medrano, AGNP-C, DNP  [facility]

## 2022-04-01 NOTE — PROGRESS NOTES
Sumaya Body presents today for   Chief Complaint   Patient presents with    Follow-up       1. \"Have you been to the ER, urgent care clinic since your last visit? Hospitalized since your last visit? \" no    2. \"Have you seen or consulted any other health care providers outside of the [address] [place] [address] since your last visit? \" no     3. For patients aged 39-70: Has the patient had a colonoscopy / FIT/ Cologuard? Yes - no Care Gap present      If the patient is female:    4. For patients aged 41-77: Has the patient had a mammogram within the past 2 years? Yes - no Care Gap present  See top three    5. For patients aged 21-65: Has the patient had a pap smear?  Yes - no Care Gap present

## 2022-04-01 NOTE — TELEPHONE ENCOUNTER
Return for Physical, Lab-fasting. in one year. Pt has medicaid so it should be scheduled at [facility]/   Per pt she will call back to schedule.

## 2022-04-04 PROBLEM — E66.9 OBESE BODY HABITUS: Status: ACTIVE | Noted: 2022-04-04

## 2022-04-04 PROBLEM — G47.33 OSA (OBSTRUCTIVE SLEEP APNEA): Status: ACTIVE | Noted: 2022-04-04

## 2022-04-04 PROBLEM — E78.2 MIXED HYPERLIPIDEMIA: Status: ACTIVE | Noted: 2022-04-04

## 2022-04-08 ENCOUNTER — HOSPITAL ENCOUNTER (OUTPATIENT)
Dept: ULTRASOUND IMAGING | Age: 42
Discharge: HOME | End: 2022-04-08
Attending: ADVANCED PRACTICE MIDWIFE
Payer: MEDICAID

## 2022-04-08 ENCOUNTER — HOSPITAL ENCOUNTER (OUTPATIENT)
Dept: MAMMOGRAPHY | Age: 42
Discharge: HOME | End: 2022-04-08
Attending: ADVANCED PRACTICE MIDWIFE
Payer: MEDICAID

## 2022-04-08 DIAGNOSIS — R92.8 OTHER ABNORMAL AND INCONCLUSIVE FINDINGS ON DIAGNOSTIC IMAGING OF BREAST: ICD-10-CM

## 2022-04-08 PROCEDURE — 77062 BREAST TOMOSYNTHESIS BI: CPT

## 2022-04-08 PROCEDURE — 76642 ULTRASOUND BREAST LIMITED: CPT

## 2022-04-29 ENCOUNTER — VIRTUAL VISIT (OUTPATIENT)
Dept: INTERNAL MEDICINE CLINIC | Age: 42
End: 2022-04-29
Payer: MEDICAID

## 2022-04-29 DIAGNOSIS — J30.89 ENVIRONMENTAL AND SEASONAL ALLERGIES: ICD-10-CM

## 2022-04-29 DIAGNOSIS — R09.82 POSTNASAL DRIP: ICD-10-CM

## 2022-04-29 DIAGNOSIS — R05.8 DRY COUGH: ICD-10-CM

## 2022-04-29 DIAGNOSIS — J02.9 SORE THROAT: ICD-10-CM

## 2022-04-29 DIAGNOSIS — J01.00 ACUTE MAXILLARY SINUSITIS, RECURRENCE NOT SPECIFIED: Primary | ICD-10-CM

## 2022-04-29 PROCEDURE — 99212 OFFICE O/P EST SF 10 MIN: CPT | Performed by: NURSE PRACTITIONER

## 2022-04-29 RX ORDER — BENZONATATE 100 MG/1
100 CAPSULE ORAL
Qty: 30 CAPSULE | Refills: 0 | Status: SHIPPED | OUTPATIENT
Start: 2022-04-29 | End: 2022-05-09

## 2022-04-29 RX ORDER — AMOXICILLIN 500 MG/1
500 CAPSULE ORAL 2 TIMES DAILY
Qty: 20 CAPSULE | Refills: 0 | Status: SHIPPED | OUTPATIENT
Start: 2022-04-29 | End: 2022-05-09

## 2022-04-29 RX ORDER — SODIUM CHLORIDE 0.65 %
1 AEROSOL, SPRAY (ML) NASAL
Qty: 0.05 ML | Refills: 11
Start: 2022-04-29

## 2022-04-29 NOTE — PROGRESS NOTES
[id number]   [place], [address]  [phone] [facility]/[phone] fax    4/29/2022    HPI:   Saleem Munroe 1980 is a pleasant WHITE/NON- female who presents virtual today for a sick visit. Past Medical History:   Diagnosis Date    Anxiety disorder 5/10/2018    Bipolar 1 disorder ([facility] [address]     [address]    COPD with asthma ([facility] [address] 7/14/2017    Genital herpes     GERD (gastroesophageal reflux disease) 4/13/2017    Mixed hyperlipidemia     Numerous moles 05/10/2018    Dr Ivonne Aguillon, Татьяна Ramirez, Man[place]    OAB (overactive bladder)     SOHAIL (obstructive sleep apnea) 4/4/2022    PMDD (premenstrual dysphoric disorder) 5/10/2018    Vitamin D deficiency 4/13/2017     Past Surgical History:   Procedure Laterality Date    HX COLONOSCOPY      age 22 for diarrhea    HX CYST REMOVAL      hand    HX GYN      HX ORTHOPAEDIC      right hand    HX OVARIAN CYST REMOVAL      right ovary     Current Outpatient Medications   Medication Sig    benzonatate (Tessalon Perles) 100 mg capsule Take 1 Capsule by mouth three (3) times daily as needed for Cough for up to 10 days. Indications: cough    amoxicillin (AMOXIL) 500 mg capsule Take 1 Capsule by mouth two (2) times a day for 10 days.  sodium chloride (Saline Mist) 0.65 % nasal squeeze bottle 0.05 mL by Both Nostrils route daily as needed for Congestion.  topiramate (TOPAMAX) 100 mg tablet     cyclobenzaprine (FLEXERIL) 5 mg tablet cyclobenzaprine 5 mg tablet    dextroamphetamine-amphetamine (ADDERALL) 20 mg tablet dextroamphetamine-amphetamine 20 mg tablet   TAKE 1 TABLET BY MOUTH 3 TIMES A DAY    hydrocortisone (HYTONE) 2.5 % ointment hydrocortisone 2.5 % topical ointment    etonogestreL (Nexplanon) 68 mg impl by SubDERmal route.  venlafaxine-SR (EFFEXOR-XR) 150 mg capsule Take 1 Cap by mouth daily.  busPIRone (BUSPAR) 10 mg tablet Take 50 mg by mouth daily.  Take 3 tabs in the morning and 2 tabs at night.  prazosin (MINIPRESS) 2 mg capsule Take 2 mg by mouth nightly.  ziprasidone (GEODON) 20 mg capsule Take 20 mg by mouth two (2) times a day.  lamoTRIgine (LAMICTAL) 100 mg tablet Take 100 mg by mouth nightly.  clonazePAM (KlonoPIN) 1 mg tablet Take 1 mg by mouth three (3) times daily as needed.  QUEtiapine (SEROQUEL) 50 mg tablet Take 100 mg by mouth at bedtime as directed for dose pack. Take 2 tablets at bedtime.  albuterol (PROVENTIL HFA, VENTOLIN HFA, PROAIR HFA) 90 mcg/actuation inhaler Take 2 Puffs by inhalation every four (4) hours as needed for Wheezing or Shortness of Breath. (Patient not taking: Reported on 4/29/2022)     No current facility-administered medications for this visit. Allergies and Intolerances:    Allergies   Allergen Reactions    Prozac [Fluoxetine] Unknown (comments) and Other (comments)     Family History:   Family History   Problem Relation Age of Onset   [facility] Migraines Mother    [facility] Stroke Mother    [facility] Depression Mother     Hypertension Mother     Diabetes Mother     Thyroid Disease Mother    [facility] Arthritis-rheumatoid Mother     Alcohol abuse Father     Cancer Father         skin cancer    Hypertension Father     Asthma Sister     Diabetes Sister     Heart Disease Sister     Depression Sister     Substance Abuse Sister         tobacco use    Hypertension Sister     Heart Failure Sister     Thyroid Disease Sister     Stroke Maternal Grandmother     Substance Abuse Maternal Grandmother         tobacco use    Breast Cancer Maternal Grandmother     Alzheimer's Disease Maternal Grandmother     Diabetes Maternal Grandfather     COPD Maternal Grandfather     Substance Abuse Maternal Grandfather         tobacco use    Substance Abuse Paternal Grandmother         tobacco use    Substance Abuse Paternal Grandfather     Breast Cancer Paternal Aunt     Arthritis Maternal Aunt      Social History:   She  reports that she quit smoking about 2 years ago. Her smoking use included cigarettes. She started smoking about 30 years ago. She has a 34.50 pack-year smoking history. She has never used smokeless tobacco.   Social History     Substance and Sexual Activity   Alcohol Use Not Currently    Alcohol/week: 2.0 standard drinks    Types: 2 Glasses of wine per week    Comment: 2x week and on weekends: 1-6 drinks/sitting     Immunization History:  Immunization History   Administered Date(s) Administered    Influenza Vaccine (>6 mo Afluria QUAD Vial [id number] (0.25 mL) / 38998 (0.5 mL)) 09/26/2016, 09/28/2018, 09/01/2019    Influenza Vaccine MotionDSP) PF (>6 Mo Flulaval, Fluarix, and >3 Yrs Afluria, Fluzone 60200) 10/09/2017, 10/04/2019, 10/07/2020    PPD 11/15/2010    Pneumococcal Polysaccharide (PPSV-23) 09/26/2016    TB Skin Test (PPD) Intradermal 06/09/2020    Tdap 07/16/2012       Todays concern:  Developed a sore throat that feels like razor blades, nonproductive cough, maxillary/frontal sinus pressure. She denies H/A, fever, fatigue, CP, or SOB. She is seeking [facility] for her cough. Review of Systems:   As above included in HPI. Otherwise 11 point review of systems negative including constitutional, skin, HENT, eyes, respiratory, cardiovascular, gastrointestinal, genitourinary, musculoskeletal, endocrine, hematologic, allergy, and neurologic. Physical:   Exam:   Vital Signs: (As obtained by patient/caregiver at home)  There were not vitals taken for this visit.      PHYSICAL EXAMINATION:  [ INSTRUCTIONS:  \"[x]\" Indicates a positive item  \"[]\" Indicates a negative item  -- DELETE ALL ITEMS NOT EXAMINED]      Constitutional: [x] Appears well-developed and well-nourished [] No apparent distress      [] Abnormal - sick but not toxic    Mental status: [x] Alert and awake  [x] Oriented to person/place/time [x] Able to follow commands    [] Abnormal -     Eyes:   EOM    [x]  Normal    [] Abnormal -   Sclera  [x]  Normal [] Abnormal -          Discharge [x]  None visible   [] Abnormal -     HENT, Neck: [x] Normocephalic, atraumatic  [] Abnormal - . [x] Mouth/Throat: Mucous membranes are moist    Pulmonary/Chest: [x] Respiratory effort normal   [x] No visualized signs of difficulty breathing or respiratory distress        [] Abnormal - no coughing noted during visit     Musculoskeletal:   [] Normal gait with no signs of ataxia         [x] Normal range of motion of neck        [] Abnormal - did not assess ambulation    Neurological:        [x] No Facial Asymmetry (Cranial nerve 7 motor function) (limited exam due to video visit)          [x] No gaze palsy        [] Abnormal -          Skin:        [] No significant exanthematous lesions or discoloration noted on facial skin         [] Abnormal - did not assess           Psychiatric:       [x] Normal Affect [] Abnormal -        [x] No Hallucinations      Plan:      ICD-10-CM ICD-9-CM    1. Acute maxillary sinusitis, recurrence not specified  J01.00 461.0 amoxicillin (AMOXIL) 500 mg capsule   2. Sore throat  J02.9 462 amoxicillin (AMOXIL) 500 mg capsule   3. Dry cough  R05.8 786.2 benzonatate (Tessalon Perles) 100 mg capsule   4. Environmental and seasonal allergies  J30.89 477.8 sodium chloride (Saline Mist) 0.65 % nasal squeeze bottle   5. Postnasal drip  R09.82 784.91      -Sinuitis  Initiate amoxicillin x10 days    -Dry cough  Initiate tessalon perles    -Allergies  Use saline spray BID to wash out sinus      Dr. Jim Saleh, AGNP-C, DNP  [facility] [place]       The total time was 15 minutes. At least 50% of that time was spent in counseling and/or coordination of care. My summary of patient counseling and coordination of care includes reviewing medical record, lab review, assessing patient, placing orders, and discussing plan of care with patient.           Jose Daniel Garner, who was evaluated through a synchronous (real-time) audio-video encounter, and/or her healthcare decision maker, is aware that it is a billable service, which includes applicable co-pays, with coverage as determined by her insurance carrier. She provided verbal consent to proceed: Yes, and patient identification was verified. This visit was conducted pursuant to the emergency declaration under the [address] [place] [address], [address] [place] [address] authority and the [employer] Act. A caregiver was present when appropriate. Ability to conduct physical exam was limited. The patient was located in a state where the provider was licensed to provide care.      --Micky Giron DNP on 4/29/2022 at 8:54 AM

## 2022-04-29 NOTE — PROGRESS NOTES
Chief Complaint   Patient presents with    Sore Throat     sore throat, pt c/o pain when swallowing  x 2 days    Cough     dry cough x onset lastnight     1. \"Have you been to the ER, urgent care clinic since your last visit? Hospitalized since your last visit? \" No    2. \"Have you seen or consulted any other health care providers outside of the [address] [place] [address] since your last visit? \" No     3. For patients aged 39-70: Has the patient had a colonoscopy / FIT/ Cologuard? NA - based on age      If the patient is female:    4. For patients aged 41-77: Has the patient had a mammogram within the past 2 years? Yes - no Care Gap present      5. For patients aged 21-65: Has the patient had a pap smear?  Yes - no Care Gap present

## 2023-01-27 NOTE — PROGRESS NOTES
Contacted patient and verified identity using name and date of birth (2- identifiers)  Spoke with patient and she verbalized understanding of elevated WBC count and elevated protein and need to recheck levels. Pre-DM noted. Need to repeat CBC and CMP. Xerosis Aggressive Treatment: I recommended application of Cetaphil or CeraVe numerous times a day going to bed to all dry areas. I also prescribed a topical steroid for twice daily use.

## 2023-02-21 ENCOUNTER — TELEPHONE (OUTPATIENT)
Age: 43
End: 2023-02-21

## 2023-02-21 NOTE — TELEPHONE ENCOUNTER
----- Message from Bharti Larson sent at 2/21/2023  1:43 PM EST -----  Subject: Appointment Request    Reason for Call: Established Patient Appointment needed: Routine Existing   Condition Follow Up    QUESTIONS    Reason for appointment request? Available appointments did not meet   patient need     Additional Information for Provider? Patient would like to be put on   Ozempic for weight loss and would like to be seen prior to your first   available on 02/27/23.  She is available all day 02/24/23 for an   appointment.  ---------------------------------------------------------------------------  --------------  [address]  [phone]; OK to leave message on voicemail  ---------------------------------------------------------------------------  --------------  SCRIPT ANSWERS  COVID Screen: Bradly Blackwell

## 2023-02-24 ENCOUNTER — OFFICE VISIT (OUTPATIENT)
Age: 43
End: 2023-02-24
Payer: COMMERCIAL

## 2023-02-24 VITALS
SYSTOLIC BLOOD PRESSURE: 123 MMHG | WEIGHT: 224 LBS | BODY MASS INDEX: 37.32 KG/M2 | TEMPERATURE: 97.4 F | OXYGEN SATURATION: 95 % | DIASTOLIC BLOOD PRESSURE: 70 MMHG | RESPIRATION RATE: 18 BRPM | HEART RATE: 89 BPM | HEIGHT: 65 IN

## 2023-02-24 DIAGNOSIS — E66.09 CLASS 2 OBESITY DUE TO EXCESS CALORIES WITHOUT SERIOUS COMORBIDITY WITH BODY MASS INDEX (BMI) OF 37.0 TO 37.9 IN ADULT: ICD-10-CM

## 2023-02-24 DIAGNOSIS — S02.5XXB OPEN FRACTURE OF TOOTH, INITIAL ENCOUNTER: ICD-10-CM

## 2023-02-24 DIAGNOSIS — K05.10 GUM INFLAMMATION: Primary | ICD-10-CM

## 2023-02-24 DIAGNOSIS — E66.9 OBESE BODY HABITUS: ICD-10-CM

## 2023-02-24 DIAGNOSIS — R06.83 SNORING: ICD-10-CM

## 2023-02-24 PROBLEM — E66.812 CLASS 2 OBESITY DUE TO EXCESS CALORIES WITHOUT SERIOUS COMORBIDITY WITH BODY MASS INDEX (BMI) OF 37.0 TO 37.9 IN ADULT: Status: ACTIVE | Noted: 2023-02-24

## 2023-02-24 PROCEDURE — 99213 OFFICE O/P EST LOW 20 MIN: CPT | Performed by: NURSE PRACTITIONER

## 2023-02-24 RX ORDER — METHYLPREDNISOLONE 4 MG/1
TABLET ORAL
Qty: 1 KIT | Refills: 0 | Status: SHIPPED | OUTPATIENT
Start: 2023-02-24 | End: 2023-03-02

## 2023-02-24 SDOH — ECONOMIC STABILITY: INCOME INSECURITY: HOW HARD IS IT FOR YOU TO PAY FOR THE VERY BASICS LIKE FOOD, HOUSING, MEDICAL CARE, AND HEATING?: NOT HARD AT ALL

## 2023-02-24 SDOH — ECONOMIC STABILITY: FOOD INSECURITY: WITHIN THE PAST 12 MONTHS, THE FOOD YOU BOUGHT JUST DIDN'T LAST AND YOU DIDN'T HAVE MONEY TO GET MORE.: NEVER TRUE

## 2023-02-24 SDOH — ECONOMIC STABILITY: FOOD INSECURITY: WITHIN THE PAST 12 MONTHS, YOU WORRIED THAT YOUR FOOD WOULD RUN OUT BEFORE YOU GOT MONEY TO BUY MORE.: NEVER TRUE

## 2023-02-24 SDOH — ECONOMIC STABILITY: HOUSING INSECURITY
IN THE LAST 12 MONTHS, WAS THERE A TIME WHEN YOU DID NOT HAVE A STEADY PLACE TO SLEEP OR SLEPT IN A SHELTER (INCLUDING NOW)?: NO

## 2023-02-24 ASSESSMENT — PATIENT HEALTH QUESTIONNAIRE - PHQ9
10. IF YOU CHECKED OFF ANY PROBLEMS, HOW DIFFICULT HAVE THESE PROBLEMS MADE IT FOR YOU TO DO YOUR WORK, TAKE CARE OF THINGS AT HOME, OR GET ALONG WITH OTHER PEOPLE: 0
SUM OF ALL RESPONSES TO PHQ QUESTIONS 1-9: 0
7. TROUBLE CONCENTRATING ON THINGS, SUCH AS READING THE NEWSPAPER OR WATCHING TELEVISION: 0
3. TROUBLE FALLING OR STAYING ASLEEP: 0
2. FEELING DOWN, DEPRESSED OR HOPELESS: 0
6. FEELING BAD ABOUT YOURSELF - OR THAT YOU ARE A FAILURE OR HAVE LET YOURSELF OR YOUR FAMILY DOWN: 0
9. THOUGHTS THAT YOU WOULD BE BETTER OFF DEAD, OR OF HURTING YOURSELF: 0
8. MOVING OR SPEAKING SO SLOWLY THAT OTHER PEOPLE COULD HAVE NOTICED. OR THE OPPOSITE, BEING SO FIGETY OR RESTLESS THAT YOU HAVE BEEN MOVING AROUND A LOT MORE THAN USUAL: 0
1. LITTLE INTEREST OR PLEASURE IN DOING THINGS: 0
4. FEELING TIRED OR HAVING LITTLE ENERGY: 0
5. POOR APPETITE OR OVEREATING: 0
SUM OF ALL RESPONSES TO PHQ QUESTIONS 1-9: 0
SUM OF ALL RESPONSES TO PHQ QUESTIONS 1-9: 0
SUM OF ALL RESPONSES TO PHQ9 QUESTIONS 1 & 2: 0
SUM OF ALL RESPONSES TO PHQ QUESTIONS 1-9: 0

## 2023-02-24 NOTE — ASSESSMENT & PLAN NOTE
Initiate medrol dose pack  Take all tabs in each row per day, do not separate into QID freq  May take motrin after prednisone therapy

## 2023-02-24 NOTE — ASSESSMENT & PLAN NOTE
Discussed not prescribing due to shortage of medication for diabetics.   Flyer given to patient for weight loss program

## 2023-02-24 NOTE — ASSESSMENT & PLAN NOTE
Encourage patient to reach out to sleep center for sleep study results completed 12/2021. Contact information given to patient.

## 2023-02-24 NOTE — PROGRESS NOTES
[facility] of [address] [place] [address], [address]  [phone] [facility]/[phone] fax    2/24/2023    Nargis Bear 1980 is a pleasant White (non-) female. Todays concern/HPI:  Jaw pain started last Wednesday. Dentist did xray, no issues. Dentist did not look in mouth. Amox prescribed since Sunday. Pain on left at cheek bone back to jaw area. Able to press area and feel discomfort. Noted knot above tooth. No hx of fractured tooth. Pain 7/10. Pain has not improved since starting ABX. She is not comfortable with the dentist that she saw, she does not believe he assessed her appropriately. She believes he was in a rush. Will seek a new dentist.    Weight. Seeking treatment-ozempic. Sleep study done 12/2021. Never heard back on results. Past Medical History:   Diagnosis Date    Anxiety disorder 5/10/2018    Bipolar 1 disorder ([facility] [address]     [address] [facility]    COPD with asthma ([facility] [address] 7/14/2017    Genital herpes     GERD (gastroesophageal reflux disease) 4/13/2017    Mixed hyperlipidemia     Numerous moles 05/10/2018    Dr Evan Olson, Jinny Reabelardo, Derm    OAB (overactive bladder)     Open fracture of tooth 2/24/2023    ROBLES (obstructive sleep apnea) 4/4/2022    PMDD (premenstrual dysphoric disorder) 5/10/2018    Vitamin D deficiency 4/13/2017     Current Outpatient Medications   Medication Sig    methylPREDNISolone (MEDROL DOSEPACK) 4 MG tablet Take by mouth. albuterol sulfate HFA (PROVENTIL;VENTOLIN;PROAIR) 108 (90 Base) MCG/ACT inhaler Inhale 2 puffs into the lungs every 4 hours as needed    amphetamine-dextroamphetamine (ADDERALL) 20 MG tablet dextroamphetamine-amphetamine 20 mg tablet   TAKE 1 TABLET BY MOUTH 3 TIMES A DAY    busPIRone (BUSPAR) 10 MG tablet Take 50 mg by mouth daily    clonazePAM (KLONOPIN) 1 MG tablet Take 1 mg by mouth 3 times daily as needed.     cyclobenzaprine (FLEXERIL) 5 MG tablet cyclobenzaprine 5 mg tablet etonogestrel (NEXPLANON) 68 MG implant Inject into the skin    hydrocortisone 2.5 % ointment hydrocortisone 2.5 % topical ointment    lamoTRIgine (LAMICTAL) 100 MG tablet Take 100 mg by mouth    prazosin (MINIPRESS) 2 MG capsule Take 2 mg by mouth    QUEtiapine (SEROQUEL) 50 MG tablet Take 100 mg by mouth    sodium chloride (OCEAN) 0.65 % nasal spray 1 spray by Nasal route daily as needed    topiramate (TOPAMAX) 100 MG tablet ceived the following from [facility]: Outside name: topiramate (TOPAMAX) 100 mg tablet    venlafaxine (EFFEXOR XR) 150 MG extended release capsule Take 1 capsule by mouth daily    ziprasidone (GEODON) 20 MG capsule Take 20 mg by mouth 2 times daily     No current facility-administered medications for this visit. Physical:   Vitals:    02/24/23 1338   BP: 123/70   Pulse: 89   Resp: 18   Temp: 97.4 °F (36.3 °C)   TempSrc: Temporal   SpO2: 95%   Weight: 224 lb (101.6 kg)   Height: 5' 5\" (1.651 m)          Exam:   Physical Exam  Constitutional:       Appearance: Normal appearance. She is obese. HENT:      Right Ear: Tympanic membrane normal.      Left Ear: Tympanic membrane normal.      Mouth/Throat:      Mouth: Mucous membranes are moist.        Comments: Noted jagged edge to lateral side of tooth. Appears fractured. Has filling in that tooth  Noted lump on gum above tooth. No redness or drainage noted  Eyes:      Extraocular Movements: Extraocular movements intact. Conjunctiva/sclera: Conjunctivae normal.   Cardiovascular:      Rate and Rhythm: Normal rate. Pulmonary:      Effort: Pulmonary effort is normal.   Musculoskeletal:         General: Normal range of motion. Skin:     General: Skin is warm and dry. Neurological:      Mental Status: She is alert and oriented to person, place, and time. Psychiatric:         Mood and Affect: Mood normal.       Body mass index is 37.28 kg/m². Review of Data:  N/A    Plan:    1.  Gum inflammation  Assessment & Plan:  Initiate medrol dose pack  Take all tabs in each row per day, do not separate into QID freq  May take motrin after prednisone therapy  Orders:  -     methylPREDNISolone (MEDROL DOSEPACK) 4 MG tablet; Take by mouth., Disp-1 kit, R-0Normal  2. Open fracture of tooth, initial encounter  Assessment & Plan:  Recommend Konikoff and LWSS  Orders:  -     methylPREDNISolone (MEDROL DOSEPACK) 4 MG tablet; Take by mouth., Disp-1 kit, R-0Normal  3. Class 2 obesity due to excess calories without serious comorbidity with body mass index (BMI) of 37.0 to 37.9 in adult  4. Snoring  Assessment & Plan:  Encourage patient to reach out to sleep center for sleep study results completed 12/2021. Contact information given to patient. 5. Obese body habitus  Assessment & Plan:  Discussed not prescribing due to shortage of medication for diabetics. Flyer given to patient for weight loss program      Reviewed medication and completed medication reconciliation with the patient. Reviewed side effects of medications with the patient. Questions were answered and patient verb understanding. Past Surgical History:   Procedure Laterality Date    CYST REMOVAL      hand    GYN      ORTHOPEDIC SURGERY      right hand    OVARIAN CYST REMOVAL      right ovary     Allergies and Intolerances:    Allergies   Allergen Reactions    Fluoxetine Other (See Comments)     Other reaction(s): Unknown (comments)     Family History:   Family History   Problem Relation Age of Onset    Hypertension Mother     Breast Cancer Paternal Aunt     Substance Abuse Paternal Grandfather     Substance Abuse Paternal Grandmother         tobacco use    Substance Abuse Maternal Grandfather         tobacco use    COPD Maternal Grandfather     Diabetes Maternal Grandfather     Alzheimer's Disease Maternal Grandmother     Breast Cancer Maternal Grandmother     Substance Abuse Maternal Grandmother         tobacco use    Stroke Maternal Grandmother     Thyroid Disease Sister Heart Failure Sister     Hypertension Sister     Substance Abuse Sister         tobacco use    Depression Sister     Heart Disease Sister     Diabetes Sister     Asthma Sister     Hypertension Father     Cancer Father         skin cancer    Migraines Mother     Stroke Mother     Depression Mother     Arthritis Maternal Aunt     Diabetes Mother     Thyroid Disease Mother     Rheum Arthritis Mother     Alcohol Abuse Father      Social History:   She  reports that she quit smoking about 3 years ago. Her smoking use included cigarettes. She started smoking about 31 years ago. She smoked an average of 1.5 packs per day. She has never used smokeless tobacco.   Social History     Substance and Sexual Activity   Alcohol Use Not Currently    Alcohol/week: 2.0 standard drinks     Immunization History:  Immunization History   Administered Date(s) Administered    Influenza Quadv 09/26/2016, 09/28/2018, 09/01/2019    Influenza, FLUARIX, FLULAVAL, FLUZONE (age 10 mo+) AND AFLURIA, (age 1 y+), PF, 0.5mL 10/09/2017, 10/04/2019, 10/07/2020    PPD Test 06/09/2020    Pneumococcal Polysaccharide ([id number]) 09/26/2016    Tdap (Boostrix, Adacel) 07/16/2012    Tst, Unspecified Formulation 11/15/2010         Return if symptoms worsen or fail to improve.         Dr. Flori Groves, AGNP-C, DNP  [facility] of [address] [place] [address]

## 2023-02-24 NOTE — PROGRESS NOTES
Jovan Durbin presents today for   Chief Complaint   Patient presents with    Jaw Pain     Left side jaw pain since last Wednesday       1. \"Have you been to the ER, urgent care clinic since your last visit? Hospitalized since your last visit? \" NO    2. \"Have you seen or consulted any other health care providers outside of the [address] [place] [address] since your last visit? \" Yes, dentist     3. For patients aged 39-70: Has the patient had a colonoscopy / FIT/ Cologuard? No      If the patient is female:    4. For patients aged 41-77: Has the patient had a mammogram within the past 2 years? yes  See top three    5. For patients aged 21-65: Has the patient had a pap smear?  Yes.

## 2023-03-22 ENCOUNTER — TELEPHONE (OUTPATIENT)
Age: 43
End: 2023-03-22

## 2023-03-22 NOTE — TELEPHONE ENCOUNTER
----- Message from Dano Ga sent at 3/22/2023 11:47 AM EDT -----  Subject: Referral Request    Reason for referral request? Pt is requesting a referral for a Bariatrics   specialist to be seen for weight loss/management. Pt would like to go to   any location. Please contact pt when this is approved and ready for   scheduling. Provider patient wants to be referred to(if known):     Provider Phone Number(if known):     Additional Information for Provider?   ---------------------------------------------------------------------------  --------------  [address]    [phone]; OK to leave message on voicemail  ---------------------------------------------------------------------------  --------------

## 2023-03-22 NOTE — TELEPHONE ENCOUNTER
----- Message from Felicia Resendiz sent at 3/22/2023 11:49 AM EDT -----  Subject: Message to Provider    QUESTIONS  Information for Provider? pt called and stated she has tried to get an   appt with Bariatrics with [facility] [employer] and they requested a zoom meeting   and pt does not know how to do that and there was only 1 phone number to   call and pt could not get any help. pt also tried to call [facility] and they   do not take pt's insurance. please contact pt regarding this message. ---------------------------------------------------------------------------  --------------  Arnold Del Rosario [facility]  [phone]; OK to leave message on voicemail,OK to respond with electronic   message via [employer] portal (only for patients who have registered [employer]   account)  ---------------------------------------------------------------------------  --------------  SCRIPT ANSWERS  Relationship to Patient?  Self

## 2023-03-23 NOTE — TELEPHONE ENCOUNTER
Patient states that she will just schedule with [facility] bariatric program since [facility] doesn't participate with her insurance.

## 2023-06-08 ENCOUNTER — TELEPHONE (OUTPATIENT)
Age: 43
End: 2023-06-08

## 2023-06-08 ENCOUNTER — OFFICE VISIT (OUTPATIENT)
Age: 43
End: 2023-06-08
Payer: COMMERCIAL

## 2023-06-08 VITALS
SYSTOLIC BLOOD PRESSURE: 127 MMHG | WEIGHT: 231 LBS | HEART RATE: 94 BPM | OXYGEN SATURATION: 99 % | TEMPERATURE: 97.8 F | DIASTOLIC BLOOD PRESSURE: 70 MMHG | BODY MASS INDEX: 38.49 KG/M2 | RESPIRATION RATE: 17 BRPM | HEIGHT: 65 IN

## 2023-06-08 DIAGNOSIS — Z71.3 ENCOUNTER FOR DIETARY CONSULTATION: ICD-10-CM

## 2023-06-08 DIAGNOSIS — Z71.3 WEIGHT LOSS COUNSELING, ENCOUNTER FOR: ICD-10-CM

## 2023-06-08 DIAGNOSIS — E66.01 CLASS 2 SEVERE OBESITY DUE TO EXCESS CALORIES WITH SERIOUS COMORBIDITY AND BODY MASS INDEX (BMI) OF 39.0 TO 39.9 IN ADULT (HCC): Primary | ICD-10-CM

## 2023-06-08 PROCEDURE — 99203 OFFICE O/P NEW LOW 30 MIN: CPT | Performed by: NURSE PRACTITIONER

## 2023-06-08 RX ORDER — LIRAGLUTIDE 6 MG/ML
INJECTION, SOLUTION SUBCUTANEOUS
Qty: 5 ADJUSTABLE DOSE PRE-FILLED PEN SYRINGE | Refills: 4 | Status: SHIPPED | OUTPATIENT
Start: 2023-06-08

## 2023-06-08 ASSESSMENT — ENCOUNTER SYMPTOMS
EYES NEGATIVE: 1
GASTROINTESTINAL NEGATIVE: 1
ALLERGIC/IMMUNOLOGIC NEGATIVE: 1
RESPIRATORY NEGATIVE: 1

## 2023-06-08 NOTE — PROGRESS NOTES
Weight Loss Progress Note: Initial Physician Visit      Alex Ortiz is a 37 y.o. female with BMI 39.7 who is here for her initial evaluation for the medical weight loss medication program. Patient has not previously done a [facility] program but has done [employer] in the past with a 35lbs weight loss, but was unable to sustain it. She wants to lose weight now to help improve other co-morbidities such as COPD and ROBLES. CC: Obesity    Weight History  Current weight 231lb   Goal weight 165lb  Highest weight 231lbs   (See weight gain time line scanned into media section of chart)    Food intolerances (gas, bloating, diarrhea, vomiting)? Tomato sauce. Weight loss History  How many weight loss attempts have you had? A few attempts. Which program were you most successful doing? [employer] x years ago - lost 35lbs    Significant Medical History    Have you ever taken appetite suppressants? No.  If yes: Rx or OTC? If yes; Any negative side effects? Ever diagnosed with sleep apnea or put on CPAP? Yes. Ever had bariatric surgery?: No.    Pregnant or planning on becoming pregnant w/in 6 months: No.      Significant Psychosocial History   Any history of drug abuse or dependence:   Current Major Lifestyle Changes: ROBLES, COPD, PMDD  Any potential unsupportive people: No.  Why are you starting a weight loss program now? Patient desires to lose weight because she is physically and mentally unhappy with her body image and health. Are you ready? Yes. History of binge eating disorder or anorexia: H/x of anorexia and now binge eating. If yes, are you currently being treated?  No.    Social History  Social History     Tobacco Use    Smoking status: Former     Packs/day: 1.50     Types: Cigarettes     Start date: 2/9/1992     Quit date: 8/11/2019     Years since quitting: 3.8    Smokeless tobacco: Never   Substance Use Topics    Alcohol use: Not Currently     Alcohol/week: 2.0 standard drinks     How many times

## 2023-06-19 ENCOUNTER — TELEPHONE (OUTPATIENT)
Age: 43
End: 2023-06-19

## 2023-06-19 NOTE — TELEPHONE ENCOUNTER
Patient requesting to speak with Lorrie regarding the \" [employer] thelma\". Patient states has not been able to get on the thelma due to does not have Internet. Patient concerned about her status in the program if she does not complete the required material for the program.  Patient states please call [phone] to discuss.

## 2023-06-23 ENCOUNTER — HOSPITAL ENCOUNTER (OUTPATIENT)
Facility: HOSPITAL | Age: 43
End: 2023-06-23
Payer: COMMERCIAL

## 2023-06-23 DIAGNOSIS — Z12.31 ENCOUNTER FOR SCREENING MAMMOGRAM FOR MALIGNANT NEOPLASM OF BREAST: ICD-10-CM

## 2023-06-23 PROCEDURE — 77063 BREAST TOMOSYNTHESIS BI: CPT

## 2023-06-29 ENCOUNTER — TELEPHONE (OUTPATIENT)
Age: 43
End: 2023-06-29

## 2023-07-12 ENCOUNTER — TELEPHONE (OUTPATIENT)
Age: 43
End: 2023-07-12

## 2023-07-12 NOTE — TELEPHONE ENCOUNTER
Advised per Nasim Leyva NP. Patient stated understanding. · Continue amlodipine 5 mg daily, Cardura 2 mg daily, lisinopril 5 mg daily via PEG tube

## 2023-07-12 NOTE — TELEPHONE ENCOUNTER
The patient called stating that her weight keep going up and down. She would like to know if this is normal while taking the saxenda.

## 2023-07-18 ENCOUNTER — OFFICE VISIT (OUTPATIENT)
Age: 43
End: 2023-07-18
Payer: COMMERCIAL

## 2023-07-18 VITALS
SYSTOLIC BLOOD PRESSURE: 116 MMHG | WEIGHT: 220 LBS | DIASTOLIC BLOOD PRESSURE: 72 MMHG | OXYGEN SATURATION: 98 % | TEMPERATURE: 98 F | BODY MASS INDEX: 36.65 KG/M2 | RESPIRATION RATE: 18 BRPM | HEIGHT: 65 IN | HEART RATE: 105 BPM

## 2023-07-18 DIAGNOSIS — E66.09 CLASS 2 OBESITY DUE TO EXCESS CALORIES WITHOUT SERIOUS COMORBIDITY WITH BODY MASS INDEX (BMI) OF 36.0 TO 36.9 IN ADULT: Primary | ICD-10-CM

## 2023-07-18 DIAGNOSIS — Z71.3 WEIGHT LOSS COUNSELING, ENCOUNTER FOR: ICD-10-CM

## 2023-07-18 DIAGNOSIS — Z71.3 ENCOUNTER FOR DIETARY CONSULTATION: ICD-10-CM

## 2023-07-18 PROCEDURE — 99213 OFFICE O/P EST LOW 20 MIN: CPT | Performed by: NURSE PRACTITIONER

## 2023-07-18 ASSESSMENT — ENCOUNTER SYMPTOMS
EYES NEGATIVE: 1
ALLERGIC/IMMUNOLOGIC NEGATIVE: 1
GASTROINTESTINAL NEGATIVE: 1
RESPIRATORY NEGATIVE: 1

## 2023-08-22 ENCOUNTER — OFFICE VISIT (OUTPATIENT)
Age: 43
End: 2023-08-22
Payer: COMMERCIAL

## 2023-08-22 VITALS
OXYGEN SATURATION: 98 % | DIASTOLIC BLOOD PRESSURE: 60 MMHG | TEMPERATURE: 97.7 F | SYSTOLIC BLOOD PRESSURE: 125 MMHG | RESPIRATION RATE: 17 BRPM | HEIGHT: 65 IN | HEART RATE: 75 BPM | BODY MASS INDEX: 36.32 KG/M2 | WEIGHT: 218 LBS

## 2023-08-22 DIAGNOSIS — E66.09 CLASS 2 OBESITY DUE TO EXCESS CALORIES WITHOUT SERIOUS COMORBIDITY WITH BODY MASS INDEX (BMI) OF 36.0 TO 36.9 IN ADULT: Primary | ICD-10-CM

## 2023-08-22 DIAGNOSIS — Z71.3 WEIGHT LOSS COUNSELING, ENCOUNTER FOR: ICD-10-CM

## 2023-08-22 DIAGNOSIS — Z71.3 ENCOUNTER FOR DIETARY CONSULTATION: ICD-10-CM

## 2023-08-22 PROCEDURE — 99213 OFFICE O/P EST LOW 20 MIN: CPT | Performed by: NURSE PRACTITIONER

## 2023-08-22 ASSESSMENT — ENCOUNTER SYMPTOMS
EYES NEGATIVE: 1
RESPIRATORY NEGATIVE: 1

## 2023-08-22 NOTE — PROGRESS NOTES
MG tablet Take 1 tablet by mouth      prazosin (MINIPRESS) 2 MG capsule Take 1 capsule by mouth      venlafaxine (EFFEXOR XR) 150 MG extended release capsule Take 1 capsule by mouth daily      etonogestrel (NEXPLANON) 68 MG implant Inject into the skin       No current facility-administered medications for this visit.        Allergies   Allergen Reactions    Fluoxetine Other (See Comments)     Other reaction(s): Unknown (comments)       Social History     Tobacco Use    Smoking status: Former     Packs/day: 1.50     Types: Cigarettes     Start date: 1992     Quit date: 2019     Years since quittin.0    Smokeless tobacco: Never   Substance Use Topics    Alcohol use: Not Currently     Alcohol/week: 2.0 standard drinks    Drug use: No     Types: OTC, Prescription       Family History   Problem Relation Age of Onset    Hypertension Mother     Breast Cancer Paternal Aunt     Substance Abuse Paternal Grandfather     Substance Abuse Paternal Grandmother         tobacco use    Substance Abuse Maternal Grandfather         tobacco use    COPD Maternal Grandfather     Diabetes Maternal Grandfather     Alzheimer's Disease Maternal Grandmother     Breast Cancer Maternal Grandmother     Substance Abuse Maternal Grandmother         tobacco use    Stroke Maternal Grandmother     Thyroid Disease Sister     Heart Failure Sister     Hypertension Sister     Substance Abuse Sister         tobacco use    Depression Sister     Heart Disease Sister     Diabetes Sister     Asthma Sister     Hypertension Father     Cancer Father         skin cancer    Migraines Mother     Stroke Mother     Depression Mother     Arthritis Maternal Aunt     Diabetes Mother     Thyroid Disease Mother     Rheum Arthritis Mother     Alcohol Abuse Father        Family Status   Relation Name Status    Mother  Alive        HTN, TIA, depression    PAunt  (Not Specified)    PGF      PGM      MGF      MGM      Sister  Alive

## 2023-08-23 ENCOUNTER — TELEPHONE (OUTPATIENT)
Age: 43
End: 2023-08-23

## 2023-08-23 NOTE — TELEPHONE ENCOUNTER
Pt called requesting to speak to NP regarding symptoms she experienced while taking Saxenda. Pt states she was nauseous when taking Saxenda at a lower dose and would like to know if she would experience this again. Pt can be reached at [phone].

## 2023-08-29 ENCOUNTER — TELEPHONE (OUTPATIENT)
Age: 43
End: 2023-08-29

## 2023-08-29 NOTE — TELEPHONE ENCOUNTER
The patient called stating that she is unable to get the Saxenda due to the shortage. She stated that she has been off the medication since Thursday.

## 2023-08-29 NOTE — TELEPHONE ENCOUNTER
Left a message for the patient to return our call regarding the [id number] [facility].

## 2023-09-08 ENCOUNTER — TELEPHONE (OUTPATIENT)
Age: 43
End: 2023-09-08

## 2023-09-20 ENCOUNTER — TELEPHONE (OUTPATIENT)
Age: 43
End: 2023-09-20

## 2023-09-20 NOTE — TELEPHONE ENCOUNTER
The called wanting to know of some therapist to see about her eating disorder. She would like several options.

## 2023-09-27 ENCOUNTER — OFFICE VISIT (OUTPATIENT)
Age: 43
End: 2023-09-27
Payer: COMMERCIAL

## 2023-09-27 VITALS
HEIGHT: 65 IN | RESPIRATION RATE: 17 BRPM | DIASTOLIC BLOOD PRESSURE: 80 MMHG | HEART RATE: 104 BPM | OXYGEN SATURATION: 98 % | TEMPERATURE: 97.8 F | WEIGHT: 222 LBS | SYSTOLIC BLOOD PRESSURE: 116 MMHG | BODY MASS INDEX: 36.99 KG/M2

## 2023-09-27 DIAGNOSIS — E66.01 CLASS 2 SEVERE OBESITY DUE TO EXCESS CALORIES WITH SERIOUS COMORBIDITY AND BODY MASS INDEX (BMI) OF 36.0 TO 36.9 IN ADULT (HCC): Primary | ICD-10-CM

## 2023-09-27 DIAGNOSIS — Z71.3 WEIGHT LOSS COUNSELING, ENCOUNTER FOR: ICD-10-CM

## 2023-09-27 DIAGNOSIS — Z71.3 ENCOUNTER FOR DIETARY CONSULTATION: ICD-10-CM

## 2023-09-27 PROCEDURE — 99213 OFFICE O/P EST LOW 20 MIN: CPT | Performed by: NURSE PRACTITIONER

## 2023-09-27 RX ORDER — SEMAGLUTIDE 1.7 MG/.75ML
1.7 INJECTION, SOLUTION SUBCUTANEOUS
Qty: 3 ML | Refills: 1 | Status: SHIPPED | OUTPATIENT
Start: 2023-09-27

## 2023-09-27 ASSESSMENT — ENCOUNTER SYMPTOMS
EYES NEGATIVE: 1
ALLERGIC/IMMUNOLOGIC NEGATIVE: 1
RESPIRATORY NEGATIVE: 1
GASTROINTESTINAL NEGATIVE: 1

## 2023-09-27 NOTE — PROGRESS NOTES
clonazePAM (KLONOPIN) 1 MG tablet Take 1 tablet by mouth 3 times daily as needed. lamoTRIgine (LAMICTAL) 100 MG tablet Take 1 tablet by mouth      prazosin (MINIPRESS) 2 MG capsule Take 1 capsule by mouth      venlafaxine (EFFEXOR XR) 150 MG extended release capsule Take 1 capsule by mouth daily      etonogestrel (NEXPLANON) 68 MG implant Inject into the skin       No current facility-administered medications for this visit.        Allergies   Allergen Reactions    Fluoxetine Other (See Comments)     Other reaction(s): Unknown (comments)       Social History     Tobacco Use    Smoking status: Former     Packs/day: 1.5     Types: Cigarettes     Start date: 1992     Quit date: 2019     Years since quittin.1    Smokeless tobacco: Never   Substance Use Topics    Alcohol use: Not Currently     Alcohol/week: 2.0 standard drinks of alcohol    Drug use: No     Types: OTC, Prescription       Family History   Problem Relation Age of Onset    Hypertension Mother     Breast Cancer Paternal Aunt     Substance Abuse Paternal Grandfather     Substance Abuse Paternal Grandmother         tobacco use    Substance Abuse Maternal Grandfather         tobacco use    COPD Maternal Grandfather     Diabetes Maternal Grandfather     Alzheimer's Disease Maternal Grandmother     Breast Cancer Maternal Grandmother     Substance Abuse Maternal Grandmother         tobacco use    Stroke Maternal Grandmother     Thyroid Disease Sister     Heart Failure Sister     Hypertension Sister     Substance Abuse Sister         tobacco use    Depression Sister     Heart Disease Sister     Diabetes Sister     Asthma Sister     Hypertension Father     Cancer Father         skin cancer    Migraines Mother     Stroke Mother     Depression Mother     Arthritis Maternal Aunt     Diabetes Mother     Thyroid Disease Mother     Rheum Arthritis Mother     Alcohol Abuse Father        Family Status   Relation Name Status    Mother  Alive        HTN,

## 2023-10-02 ENCOUNTER — TELEPHONE (OUTPATIENT)
Age: 43
End: 2023-10-02

## 2023-10-02 NOTE — TELEPHONE ENCOUNTER
The patient called stating the Solomon Lyndsey was denied by the insurance because they are wanting more information. Patient would like a call back when it is submitted.

## 2023-10-05 NOTE — TELEPHONE ENCOUNTER
Patient LVM on nurse line stating insurance still not accepting medication wegovy and requesting more office notes. Patient also requesting a referral to a psychologist that deals eating disorders.

## 2023-10-11 ENCOUNTER — TELEPHONE (OUTPATIENT)
Age: 43
End: 2023-10-11

## 2023-10-11 NOTE — TELEPHONE ENCOUNTER
Patient said that her insurance will not cover Wegovy unless she has a prior authorization. Patient was told that insurance will need more information. She said that she has been leaving messages on the nurse line since she spoke to Sweetie Martinez on the 2nd of October. She has not heard anything from anyone and she is a bit upset. Please call patient at [phone].

## 2023-10-12 ENCOUNTER — TELEPHONE (OUTPATIENT)
Age: 43
End: 2023-10-12

## 2023-10-12 NOTE — TELEPHONE ENCOUNTER
Contacted patient and left vm to inform her that RONEL Chavez re-submitted PA for [facility] MARTI 1.7mg yesterday afternoon and received an approval this morning. Advised patient that medication was sent to [address] [place] [address] in [facility] and to reach out to the office at [phone] if any issues were to occur when picking up medication.

## 2023-11-01 ENCOUNTER — TELEPHONE (OUTPATIENT)
Age: 43
End: 2023-11-01

## 2023-11-01 NOTE — TELEPHONE ENCOUNTER
Patient called on 11/01/23 at 10:45 am requesting to get a lower does of Wegovy. She states she has been feeling sick. She also requests to have the medication sent to the Pharmacy at [facility] on [facility] in [place]   Patient's phone number is [phone].  Thank you!

## 2023-11-16 ENCOUNTER — TELEPHONE (OUTPATIENT)
Age: 43
End: 2023-11-16

## 2023-11-16 DIAGNOSIS — G47.33 OSA (OBSTRUCTIVE SLEEP APNEA): Primary | ICD-10-CM

## 2023-11-16 NOTE — TELEPHONE ENCOUNTER
----- Message from Santakayli Sreekanth sent at 11/16/2023  9:00 AM EST -----  Subject: Referral Request    Reason for referral request? sleep study  Provider patient wants to be referred to(if known):     Provider Phone Number(if known): Additional Information for Provider? pt needs a referral for a sleep   study. [facility] and sleep [place].  [phone]  ---------------------------------------------------------------------------  --------------  Dolores BASS    [phone]; OK to leave message on voicemail  ---------------------------------------------------------------------------  --------------

## 2023-11-16 NOTE — TELEPHONE ENCOUNTER
Patient called back. Advised since last seen 2021 she will need to be seen prior to any orders.  She has the appt with Dr. Lavinia Waggoner 3/2024 and will call and check on cancellations prior to that date for a new patient appt

## 2023-11-16 NOTE — TELEPHONE ENCOUNTER
----- Message from Esther Lucio sent at 11/16/2023  9:00 AM EST -----  Subject: Referral Request    Reason for referral request? sleep study  Provider patient wants to be referred to(if known):     Provider Phone Number(if known): Additional Information for Provider? pt needs a referral for a sleep   study. [facility] and sleep [place].  [phone]  ---------------------------------------------------------------------------  --------------  Janiya [facility] INFO    [phone]; OK to leave message on voicemail  ---------------------------------------------------------------------------  --------------

## 2023-11-16 NOTE — TELEPHONE ENCOUNTER
Left message to call me back  (Last visit Dr. Lazarus Fitz 11/2021 with sleep study done 12/2021 and read by Dr. Stacy Cotton. No return appt.  No showed Dr. Lazarus Fitz appt 3/14/23)

## 2023-11-16 NOTE — TELEPHONE ENCOUNTER
Pt was last seen w/ MP & had sleep study done 12/20/21 but says she never received a sleep machine.  Scheduled for 3/18/24 w/ NM but pt would like to know if she can have sleep machine ordered prior to that visit based off the 2021 sleep study, does not want to repeat test. Please advise [phone]

## 2023-11-16 NOTE — TELEPHONE ENCOUNTER
Diagnosis Orders   1.  ROBLES (obstructive sleep apnea)  [id number] [address] [facility],[address] - Radha Hargrove DO, Sleep Medicine, [facility])

## 2024-02-20 ENCOUNTER — TELEPHONE (OUTPATIENT)
Age: 44
End: 2024-02-20

## 2024-02-20 NOTE — TELEPHONE ENCOUNTER
Pt stated that she is having pain on her left side. That hurts worse when exercising. Pt stated that she does have anxiety and the pain is not constant, it goes in and out. Pt would like to know what she should do. Pt has an appt with DA on 5/1. Please advise

## 2024-03-20 ENCOUNTER — TELEPHONE (OUTPATIENT)
Age: 44
End: 2024-03-20

## 2024-03-20 NOTE — TELEPHONE ENCOUNTER
Left message, emailed and mychart patient that appointment has been canceled since she has not been seen by Catherine since September of 2023.  Patient can be placed on [facility] wait list.

## 2024-05-30 ENCOUNTER — TELEPHONE (OUTPATIENT)
Age: 44
End: 2024-05-30

## 2024-07-25 NOTE — MR AVS SNAPSHOT
Visit Information Date & Time Provider Department Dept. Phone Encounter #  
 10/9/2017  3:00 PM Maryalice Fabry, NP [facility] [phone] [id number] Upcoming Health Maintenance Date Due DTaP/Tdap/Td series (1 - Tdap) 1/25/1999 INFLUENZA AGE 9 TO ADULT 8/1/2017 PAP AKA CERVICAL CYTOLOGY 7/22/2018 Allergies as of 10/9/2017  Review Complete On: 10/9/2017 By: Maryalice Fabry, NP Severity Noted Reaction Type Reactions Prozac [Fluoxetine] Low 02/11/2012   Not Verified Unknown (comments), Other (comments) Current Immunizations  Reviewed on 9/26/2016 Name Date Influenza Vaccine [place]en Garciasch) 9/26/2016 PPD 11/15/2010 Pneumococcal Polysaccharide (PPSV-23) 9/26/2016 Not reviewed this visit You Were Diagnosed With   
  
 Codes Comments Diarrhea, unspecified type    -  Primary ICD-10-CM: R19.7 ICD-9-CM: 787.91 Fall against object     ICD-10-CM: U87.59RM ICD-9-CM: E888.1 Rib pain on left side     ICD-10-CM: R07.81 ICD-9-CM: 786.50 Vitals BP Pulse Temp Resp Height(growth percentile) Weight(growth percentile) 110/60 (BP 1 Location: Right arm, BP Patient Position: Sitting) 76 98.1 °F (36.7 °C) (Oral) 14 5' 4\" (1.626 m) 192 lb (87.1 kg) LMP SpO2 BMI OB Status Smoking Status 10/03/2017 95% 32.96 kg/m2 Having regular periods Current Every Day Smoker Vitals History BMI and BSA Data Body Mass Index Body Surface Area  
 32.96 kg/m 2 1.98 m 2 Preferred Pharmacy Pharmacy Name Phone [facility], [address] [phone] Your Updated Medication List  
  
   
This list is accurate as of: 10/9/17  3:35 PM.  Always use your most recent med list.  
  
  
  
  
 * albuterol 90 mcg/actuation inhaler Commonly known as:  PROVENTIL HFA, VENTOLIN HFA, PROAIR HFA Take 2 Puffs by inhalation every four (4) hours as needed for Wheezing. * albuterol 90 mcg/actuation inhaler Commonly known as:  PROVENTIL HFA, VENTOLIN HFA, PROAIR HFA Take 2 Puffs by inhalation every four (4) hours as needed for Wheezing. Indications: BRONCHOSPASM PREVENTION  
  
 beclomethasone 80 mcg/actuation Aero Commonly known as:  QVAR INHALE 1 PUFF TWO TIMES A DAY Biotin 2,500 mcg Cap Take  by mouth daily. BUSPAR PO Take 10 mg by mouth two (2) times a day. * GEODON 20 mg capsule Generic drug:  ziprasidone Take 20 mg by mouth daily. In AM  
  
 * GEODON 60 mg capsule Generic drug:  ziprasidone hcl Take 80 mg by mouth two (2) times daily (with meals). inhalational spacing device 1 Each by Does Not Apply route as needed. ketoconazole 2 % shampoo Commonly known as:  NIZORAL Apply 5 to 10 mL to wet scalp, lather, leave on 5 min, & rinse; apply once every 1 to 2 wks (prophylaxis) or twice weekly for 2 to 4 wks. KlonoPIN 1 mg tablet Generic drug:  clonazePAM  
Take 1 mg by mouth daily as needed. LAMICTAL PO Take  by mouth daily. lidocaine 4 % topical cream  
Commonly known as:  XYLOCAINE Apply  to affected area two (2) times daily as needed for Pain. loperamide 2 mg tablet Commonly known as:  IMODIUM A-D Take 1 Tab by mouth four (4) times daily as needed for Diarrhea for up to 10 days. Omeprazole delayed release 20 mg tablet Commonly known as:  PRILOSEC D/R  
TAKE ONE CAPSULE BY MOUTH DAILY * SEROquel 50 mg tablet Generic drug:  QUEtiapine Take 50 mg by mouth daily. * SEROquel XR 50 mg sr tablet Generic drug:  QUEtiapine SR Take 50 mg by mouth daily. traMADol 50 mg tablet Commonly known as:  ULTRAM  
Take 1 Tab by mouth every six (6) hours as needed for Pain. Max Daily Amount: 200 mg. VALTREX 500 mg tablet Generic drug:  valACYclovir Take 500 mg by mouth daily. ZOLOFT 100 mg tablet Generic drug:  sertraline Take 100 mg by mouth daily. * Notice: This list has 6 medication(s) that are the same as other medications prescribed for you. Read the directions carefully, and ask your doctor or other care provider to review them with you. Introducing [facility]! Kalpana Mcdonald introduces [email or web address] patient portal. Now you can access parts of your medical record, email your doctor's office, and request medication refills online. 1. In your internet browser, go to [email or web address] 2. Click on the First Time User? Click Here link in the Sign In box. You will see the New Member Sign Up page. 3. Enter your [email or web address][employer] Access Code exactly as it appears below. You will not need to use this code after youve completed the sign-up process. If you do not sign up before the expiration date, you must request a new code. · [email or web address] Access Code: [email or web address] Expires: 10/12/2017  1:49 PM 
 
4. Enter the last four digits of your Social Security Number (xxxx) and Date of Birth (mm/dd/yyyy) as indicated and click Submit. You will be taken to the next sign-up page. 5. Create a [email or web address][employer]com ID. This will be your [email or web address].com login ID and cannot be changed, so think of one that is secure and easy to remember. 6. Create a 159[employer] password. You can change your password at any time. 7. Enter your Password Reset Question and Answer. This can be used at a later time if you forget your password. 8. Enter your e-mail address. You will receive e-mail notification when new information is available in [address] 9. Click Sign Up. You can now view and download portions of your medical record. 10. Click the Download Summary menu link to download a portable copy of your medical information. If you have questions, please visit the Frequently Asked Questions section of the [email or web address] website. Remember, [email or web address][employer]com is NOT to be used for urgent needs. For medical emergencies, dial 911. Now available from your iPhone and Android! Please provide this summary of care documentation to your next provider. Your primary care clinician is listed as Campos Cantu. If you have any questions after today's visit, please call [phone]. [No studies available for review at this time.] : No studies available for review at this time.

## 2024-07-30 ENCOUNTER — TELEPHONE (OUTPATIENT)
Facility: CLINIC | Age: 44
End: 2024-07-30

## 2024-07-30 DIAGNOSIS — Z01.818 PREOP TESTING: Primary | ICD-10-CM

## 2024-07-30 NOTE — TELEPHONE ENCOUNTER
Pt will be having a dental procedure at a [facility] in [place]   on 09/03- they are requesting pt to have a EKG prior to her surgery pt and advised pt  to get orders from her pcp to get one done   Please advise

## 2024-08-16 ENCOUNTER — HOSPITAL ENCOUNTER (OUTPATIENT)
Facility: HOSPITAL | Age: 44
End: 2024-08-16
Payer: COMMERCIAL

## 2024-08-16 DIAGNOSIS — Z01.818 PREOP TESTING: ICD-10-CM

## 2024-08-16 LAB
EKG ATRIAL RATE: 77 BPM
EKG DIAGNOSIS: NORMAL
EKG P AXIS: -1 DEGREES
EKG P-R INTERVAL: 162 MS
EKG Q-T INTERVAL: 386 MS
EKG QRS DURATION: 76 MS
EKG QTC CALCULATION (BAZETT): 436 MS
EKG R AXIS: 49 DEGREES
EKG T AXIS: 36 DEGREES
EKG VENTRICULAR RATE: 77 BPM

## 2024-08-16 PROCEDURE — 93005 ELECTROCARDIOGRAM TRACING: CPT

## 2024-08-16 PROCEDURE — 93010 ELECTROCARDIOGRAM REPORT: CPT | Performed by: INTERNAL MEDICINE

## 2024-08-20 ENCOUNTER — TELEPHONE (OUTPATIENT)
Facility: CLINIC | Age: 44
End: 2024-08-20

## 2024-08-21 ENCOUNTER — TELEPHONE (OUTPATIENT)
Facility: CLINIC | Age: 44
End: 2024-08-21

## 2024-08-21 NOTE — TELEPHONE ENCOUNTER
Patient is aware of results and verbalizes understanding.   Patient requested to have EKG faxed to Dentist at [facility].   Called [facility] dentistry clinic at [phone].    EKG faxed to [facility] at [phone].

## 2024-08-21 NOTE — TELEPHONE ENCOUNTER
----- Message from Michelle Salgado DNP sent at 8/19/2024  3:26 PM EDT -----  Please inform patient her EKG is normal.  Thank you

## 2024-09-08 ENCOUNTER — HOSPITAL ENCOUNTER (EMERGENCY)
Facility: HOSPITAL | Age: 44
Discharge: HOME | End: 2024-09-08
Attending: EMERGENCY MEDICINE
Payer: COMMERCIAL

## 2024-09-08 VITALS
WEIGHT: 203.4 LBS | HEART RATE: 75 BPM | BODY MASS INDEX: 33.89 KG/M2 | OXYGEN SATURATION: 94 % | HEIGHT: 65 IN | DIASTOLIC BLOOD PRESSURE: 76 MMHG | TEMPERATURE: 98.1 F | RESPIRATION RATE: 16 BRPM | SYSTOLIC BLOOD PRESSURE: 161 MMHG

## 2024-09-08 DIAGNOSIS — Z48.89 ENCOUNTER FOR POST SURGICAL WOUND CHECK: Primary | ICD-10-CM

## 2024-09-08 PROCEDURE — 99282 EMERGENCY DEPT VISIT SF MDM: CPT

## 2024-09-08 ASSESSMENT — PAIN - FUNCTIONAL ASSESSMENT: PAIN_FUNCTIONAL_ASSESSMENT: NONE - DENIES PAIN

## 2024-09-08 ASSESSMENT — LIFESTYLE VARIABLES
HOW OFTEN DO YOU HAVE A DRINK CONTAINING ALCOHOL: NEVER
HOW MANY STANDARD DRINKS CONTAINING ALCOHOL DO YOU HAVE ON A TYPICAL DAY: PATIENT DOES NOT DRINK

## 2025-01-14 ENCOUNTER — HOSPITAL ENCOUNTER (OUTPATIENT)
Dept: WOMENS IMAGING | Facility: HOSPITAL | Age: 45
Discharge: HOME | End: 2025-01-17
Payer: COMMERCIAL

## 2025-01-14 DIAGNOSIS — Z12.31 ENCOUNTER FOR SCREENING MAMMOGRAM FOR MALIGNANT NEOPLASM OF BREAST: ICD-10-CM

## 2025-01-14 PROCEDURE — 77063 BREAST TOMOSYNTHESIS BI: CPT

## 2025-01-28 ENCOUNTER — HOSPITAL ENCOUNTER (EMERGENCY)
Facility: HOSPITAL | Age: 45
Discharge: HOME | End: 2025-01-28
Attending: STUDENT IN AN ORGANIZED HEALTH CARE EDUCATION/TRAINING PROGRAM
Payer: COMMERCIAL

## 2025-01-28 ENCOUNTER — APPOINTMENT (OUTPATIENT)
Facility: HOSPITAL | Age: 45
End: 2025-01-28
Payer: COMMERCIAL

## 2025-01-28 VITALS
DIASTOLIC BLOOD PRESSURE: 78 MMHG | BODY MASS INDEX: 33.32 KG/M2 | OXYGEN SATURATION: 95 % | HEART RATE: 78 BPM | TEMPERATURE: 98.3 F | SYSTOLIC BLOOD PRESSURE: 142 MMHG | RESPIRATION RATE: 16 BRPM | HEIGHT: 65 IN | WEIGHT: 200 LBS

## 2025-01-28 DIAGNOSIS — R07.9 CHEST PAIN, UNSPECIFIED TYPE: Primary | ICD-10-CM

## 2025-01-28 LAB
ALBUMIN SERPL-MCNC: 3.8 G/DL (ref 3.4–5)
ALBUMIN/GLOB SERPL: 1 (ref 0.8–1.7)
ALP SERPL-CCNC: 138 U/L (ref 45–117)
ALT SERPL-CCNC: 22 U/L (ref 13–56)
ANION GAP SERPL CALC-SCNC: 6 MMOL/L (ref 3–18)
AST SERPL-CCNC: 17 U/L (ref 10–38)
BASOPHILS # BLD: 0.01 K/UL (ref 0–0.1)
BASOPHILS NFR BLD: 0.1 % (ref 0–2)
BILIRUB SERPL-MCNC: 0.3 MG/DL (ref 0.2–1)
BUN SERPL-MCNC: 9 MG/DL (ref 7–18)
BUN/CREAT SERPL: 11 (ref 12–20)
CALCIUM SERPL-MCNC: 8.9 MG/DL (ref 8.5–10.1)
CHLORIDE SERPL-SCNC: 106 MMOL/L (ref 100–111)
CO2 SERPL-SCNC: 26 MMOL/L (ref 21–32)
CREAT SERPL-MCNC: 0.8 MG/DL (ref 0.6–1.3)
D DIMER PPP FEU-MCNC: <0.27 UG/ML(FEU)
DIFFERENTIAL METHOD BLD: ABNORMAL
EKG ATRIAL RATE: 85 BPM
EKG DIAGNOSIS: NORMAL
EKG P AXIS: 71 DEGREES
EKG P-R INTERVAL: 164 MS
EKG Q-T INTERVAL: 298 MS
EKG QRS DURATION: 74 MS
EKG QTC CALCULATION (BAZETT): 354 MS
EKG R AXIS: 76 DEGREES
EKG T AXIS: 65 DEGREES
EKG VENTRICULAR RATE: 85 BPM
EOSINOPHIL # BLD: 0.01 K/UL (ref 0–0.4)
EOSINOPHIL NFR BLD: 0.1 % (ref 0–5)
ERYTHROCYTE [DISTWIDTH] IN BLOOD BY AUTOMATED COUNT: 13.4 % (ref 11.6–14.5)
FLUAV RNA SPEC QL NAA+PROBE: NOT DETECTED
FLUBV RNA SPEC QL NAA+PROBE: NOT DETECTED
GLOBULIN SER CALC-MCNC: 4 G/DL (ref 2–4)
GLUCOSE SERPL-MCNC: 106 MG/DL (ref 74–99)
HCT VFR BLD AUTO: 37.6 % (ref 35–45)
HGB BLD-MCNC: 12.8 G/DL (ref 12–16)
IMM GRANULOCYTES # BLD AUTO: 0.02 K/UL (ref 0–0.04)
IMM GRANULOCYTES NFR BLD AUTO: 0.3 % (ref 0–0.5)
LYMPHOCYTES # BLD: 2.06 K/UL (ref 0.9–3.6)
LYMPHOCYTES NFR BLD: 26.4 % (ref 21–52)
MAGNESIUM SERPL-MCNC: 2.5 MG/DL (ref 1.6–2.6)
MCH RBC QN AUTO: 28.4 PG (ref 24–34)
MCHC RBC AUTO-ENTMCNC: 34 G/DL (ref 31–37)
MCV RBC AUTO: 83.6 FL (ref 78–100)
MONOCYTES # BLD: 0.52 K/UL (ref 0.05–1.2)
MONOCYTES NFR BLD: 6.7 % (ref 3–10)
NEUTS SEG # BLD: 5.19 K/UL (ref 1.8–8)
NEUTS SEG NFR BLD: 66.4 % (ref 40–73)
NRBC # BLD: 0 K/UL (ref 0–0.01)
NRBC BLD-RTO: 0 PER 100 WBC
PLATELET # BLD AUTO: 229 K/UL (ref 135–420)
PMV BLD AUTO: 8.9 FL (ref 9.2–11.8)
POTASSIUM SERPL-SCNC: 3.4 MMOL/L (ref 3.5–5.5)
PROT SERPL-MCNC: 7.8 G/DL (ref 6.4–8.2)
RBC # BLD AUTO: 4.5 M/UL (ref 4.2–5.3)
SARS-COV-2 RNA RESP QL NAA+PROBE: NOT DETECTED
SODIUM SERPL-SCNC: 138 MMOL/L (ref 136–145)
SOURCE: NORMAL
TROPONIN I SERPL HS-MCNC: 3 NG/L (ref 0–54)
WBC # BLD AUTO: 7.8 K/UL (ref 4.6–13.2)

## 2025-01-28 PROCEDURE — 71046 X-RAY EXAM CHEST 2 VIEWS: CPT

## 2025-01-28 PROCEDURE — 83735 ASSAY OF MAGNESIUM: CPT

## 2025-01-28 PROCEDURE — 85025 COMPLETE CBC W/AUTO DIFF WBC: CPT

## 2025-01-28 PROCEDURE — 87636 SARSCOV2 & INF A&B AMP PRB: CPT

## 2025-01-28 PROCEDURE — 93005 ELECTROCARDIOGRAM TRACING: CPT | Performed by: STUDENT IN AN ORGANIZED HEALTH CARE EDUCATION/TRAINING PROGRAM

## 2025-01-28 PROCEDURE — 93010 ELECTROCARDIOGRAM REPORT: CPT | Performed by: INTERNAL MEDICINE

## 2025-01-28 PROCEDURE — 80053 COMPREHEN METABOLIC PANEL: CPT

## 2025-01-28 PROCEDURE — 85379 FIBRIN DEGRADATION QUANT: CPT

## 2025-01-28 PROCEDURE — 84484 ASSAY OF TROPONIN QUANT: CPT

## 2025-01-28 PROCEDURE — 99285 EMERGENCY DEPT VISIT HI MDM: CPT

## 2025-01-28 ASSESSMENT — PAIN SCALES - GENERAL: PAINLEVEL_OUTOF10: 2

## 2025-01-28 ASSESSMENT — LIFESTYLE VARIABLES
HOW OFTEN DO YOU HAVE A DRINK CONTAINING ALCOHOL: MONTHLY OR LESS
HOW MANY STANDARD DRINKS CONTAINING ALCOHOL DO YOU HAVE ON A TYPICAL DAY: 1 OR 2

## 2025-01-28 ASSESSMENT — PAIN - FUNCTIONAL ASSESSMENT: PAIN_FUNCTIONAL_ASSESSMENT: 0-10

## 2025-01-28 NOTE — ED TRIAGE NOTES
Patient presents to ER ambulatory for c/o chest pain that started around 930 this am. Patient reports she could be anxious but is unsure.

## 2025-02-03 NOTE — ED PROVIDER NOTES
this note:    XR CHEST (2 VW)   Final Result      Possible basilar interstitial changes or chronic atelectasis, left greater than   right. No consolidations.               Electronically signed by Marquis Gambino            ED BEDSIDE ULTRASOUND:   Performed by ED Physician - none    LABS:  Labs Reviewed   COMPREHENSIVE METABOLIC PANEL - Abnormal; Notable for the following components:       Result Value    Potassium 3.4 (*)     Glucose 106 (*)     BUN/Creatinine Ratio 11 (*)     Alk Phosphatase 138 (*)     All other components within normal limits   CBC WITH AUTO DIFFERENTIAL - Abnormal; Notable for the following components:    MPV 8.9 (*)     All other components within normal limits   COVID-19 & INFLUENZA COMBO   TROPONIN   MAGNESIUM   D-DIMER, QUANTITATIVE       All other labs were within normal range or not returned as of this dictation.    EMERGENCY DEPARTMENT COURSE and DIFFERENTIAL DIAGNOSIS/MDM:   Vitals:    Vitals:    01/28/25 1256 01/28/25 1315 01/28/25 1400 01/28/25 1514   BP: 116/66 110/63 117/62 (!) 142/78   Pulse:    78   Resp:    16   Temp:    98.3 °F (36.8 °C)   SpO2: 97% 95% 95%    Weight:    90.7 kg (200 lb)   Height:    1.651 m (5' 5\")       Medical Decision Making  Amount and/or Complexity of Data Reviewed  Labs: ordered.  Radiology: ordered.  ECG/medicine tests: ordered.    Patient is a 45-year-old female presenting with chest pain shortness of breath.  She is currently hemodynamically stable and appears comfortable on exam.  We will do a broad workup to evaluate for different etiologies for her symptoms.  She is low risk but we will obtain a dimer to rule out PE.  She will tested for flu and COVID.    CBC, CMP, troponin, magnesium, D-dimer all within normal limits.  This could include PE and ACS in this low risk patient.  EKG and chest x-ray within normal limits.    On reexamination patient feels that her symptoms have improved.  Discussed close follow-up with her primary care physician.    Patient

## 2025-08-25 ENCOUNTER — HOSPITAL ENCOUNTER (OUTPATIENT)
Facility: HOSPITAL | Age: 45
Discharge: HOME | End: 2025-08-28
Payer: COMMERCIAL

## 2025-08-25 ENCOUNTER — TRANSCRIBE ORDERS (OUTPATIENT)
Facility: HOSPITAL | Age: 45
End: 2025-08-25

## 2025-08-25 ENCOUNTER — TELEPHONE (OUTPATIENT)
Facility: CLINIC | Age: 45
End: 2025-08-25

## 2025-08-25 DIAGNOSIS — M25.551 RIGHT HIP PAIN: ICD-10-CM

## 2025-08-25 DIAGNOSIS — M54.50 LOW BACK PAIN, UNSPECIFIED BACK PAIN LATERALITY, UNSPECIFIED CHRONICITY, UNSPECIFIED WHETHER SCIATICA PRESENT: Primary | ICD-10-CM

## 2025-08-25 DIAGNOSIS — M54.50 LOW BACK PAIN, UNSPECIFIED BACK PAIN LATERALITY, UNSPECIFIED CHRONICITY, UNSPECIFIED WHETHER SCIATICA PRESENT: ICD-10-CM

## 2025-08-25 PROCEDURE — 72100 X-RAY EXAM L-S SPINE 2/3 VWS: CPT

## 2025-08-25 PROCEDURE — 73521 X-RAY EXAM HIPS BI 2 VIEWS: CPT
